# Patient Record
Sex: FEMALE | Race: BLACK OR AFRICAN AMERICAN | NOT HISPANIC OR LATINO | Employment: FULL TIME | URBAN - METROPOLITAN AREA
[De-identification: names, ages, dates, MRNs, and addresses within clinical notes are randomized per-mention and may not be internally consistent; named-entity substitution may affect disease eponyms.]

---

## 2023-02-21 ENCOUNTER — DOCUMENTATION (OUTPATIENT)
Dept: PRIMARY CARE | Facility: CLINIC | Age: 68
End: 2023-02-21
Payer: MEDICARE

## 2023-02-22 PROBLEM — G47.00 INSOMNIA: Status: ACTIVE | Noted: 2023-02-22

## 2023-02-22 PROBLEM — E78.5 HLD (HYPERLIPIDEMIA): Status: ACTIVE | Noted: 2023-02-22

## 2023-02-22 PROBLEM — M47.817 LUMBOSACRAL SPONDYLOSIS WITHOUT MYELOPATHY: Status: ACTIVE | Noted: 2023-02-22

## 2023-02-22 PROBLEM — I71.21 ANEURYSM OF ASCENDING AORTA (CMS-HCC): Status: ACTIVE | Noted: 2023-02-22

## 2023-02-22 PROBLEM — K21.9 ESOPHAGEAL REFLUX: Status: ACTIVE | Noted: 2023-02-22

## 2023-02-22 PROBLEM — J45.909 REACTIVE AIRWAY DISEASE (HHS-HCC): Status: ACTIVE | Noted: 2023-02-22

## 2023-02-22 PROBLEM — N17.9 AKI (ACUTE KIDNEY INJURY) (CMS-HCC): Status: ACTIVE | Noted: 2023-02-22

## 2023-02-22 PROBLEM — E11.9 DIABETES MELLITUS (MULTI): Status: ACTIVE | Noted: 2023-02-22

## 2023-02-22 PROBLEM — J32.9 CHRONIC SINUSITIS: Status: ACTIVE | Noted: 2023-02-22

## 2023-02-22 PROBLEM — Z96.21 COCHLEAR IMPLANT IN PLACE: Status: ACTIVE | Noted: 2023-02-22

## 2023-02-22 PROBLEM — M79.7 FIBROMYALGIA: Status: ACTIVE | Noted: 2023-02-22

## 2023-02-22 PROBLEM — R93.1 ELEVATED CORONARY ARTERY CALCIUM SCORE: Status: ACTIVE | Noted: 2023-02-22

## 2023-02-22 PROBLEM — F32.A DEPRESSION: Status: ACTIVE | Noted: 2023-02-22

## 2023-02-22 PROBLEM — I47.10 PAROXYSMAL SVT (SUPRAVENTRICULAR TACHYCARDIA) (CMS-HCC): Status: ACTIVE | Noted: 2023-02-22

## 2023-02-22 PROBLEM — N63.0 MASS OF BREAST: Status: ACTIVE | Noted: 2023-02-22

## 2023-02-22 PROBLEM — D86.9 SARCOIDOSIS: Status: ACTIVE | Noted: 2023-02-22

## 2023-02-22 PROBLEM — I73.9 PVD (PERIPHERAL VASCULAR DISEASE) (CMS-HCC): Status: ACTIVE | Noted: 2023-02-22

## 2023-02-22 PROBLEM — R73.03 PREDIABETES: Status: ACTIVE | Noted: 2023-02-22

## 2023-02-22 PROBLEM — M85.80 OSTEOPENIA: Status: ACTIVE | Noted: 2023-02-22

## 2023-02-22 PROBLEM — I10 BENIGN ESSENTIAL HYPERTENSION: Status: ACTIVE | Noted: 2023-02-22

## 2023-02-22 RX ORDER — BACLOFEN 10 MG/1
10 TABLET ORAL 2 TIMES DAILY
COMMUNITY
Start: 2016-10-06 | End: 2023-10-31 | Stop reason: SDUPTHER

## 2023-02-22 RX ORDER — AMMONIUM LACTATE 12 G/100G
1 CREAM TOPICAL 2 TIMES DAILY
COMMUNITY
Start: 2022-05-24

## 2023-02-22 RX ORDER — METOPROLOL SUCCINATE 200 MG/1
200 TABLET, EXTENDED RELEASE ORAL DAILY
COMMUNITY
Start: 2019-05-06 | End: 2024-01-08

## 2023-02-22 RX ORDER — ASPIRIN 81 MG/1
81 TABLET ORAL DAILY
COMMUNITY
Start: 2018-07-03

## 2023-02-22 RX ORDER — ALBUTEROL SULFATE 90 UG/1
1-2 AEROSOL, METERED RESPIRATORY (INHALATION) EVERY 4 HOURS PRN
COMMUNITY
Start: 2019-04-17

## 2023-02-22 RX ORDER — AMLODIPINE BESYLATE 5 MG/1
5 TABLET ORAL DAILY
COMMUNITY
Start: 2021-02-03

## 2023-02-22 RX ORDER — CLOTRIMAZOLE AND BETAMETHASONE DIPROPIONATE 10; .64 MG/G; MG/G
1 CREAM TOPICAL 2 TIMES DAILY
COMMUNITY
Start: 2022-09-12

## 2023-02-22 RX ORDER — ALBUTEROL SULFATE 0.83 MG/ML
2.5 SOLUTION RESPIRATORY (INHALATION) EVERY 4 HOURS PRN
COMMUNITY
Start: 2018-07-16

## 2023-02-22 RX ORDER — HYDROXYCHLOROQUINE SULFATE 200 MG/1
200 TABLET, FILM COATED ORAL 2 TIMES DAILY
COMMUNITY
Start: 2012-07-20

## 2023-02-22 RX ORDER — LORATADINE 10 MG/1
10 TABLET ORAL DAILY
COMMUNITY
Start: 2021-03-29

## 2023-02-22 RX ORDER — FUROSEMIDE 20 MG/1
20 TABLET ORAL DAILY
COMMUNITY
Start: 2022-06-14

## 2023-02-22 RX ORDER — PRAVASTATIN SODIUM 20 MG/1
20 TABLET ORAL DAILY
COMMUNITY
Start: 2018-07-03

## 2023-02-22 RX ORDER — LUBIPROSTONE 24 UG/1
24 CAPSULE ORAL
COMMUNITY
Start: 2021-09-03 | End: 2023-12-19 | Stop reason: SDUPTHER

## 2023-02-22 RX ORDER — CLOBETASOL PROPIONATE 0.5 MG/G
1 OINTMENT TOPICAL 2 TIMES DAILY PRN
COMMUNITY
Start: 2022-05-24

## 2023-02-22 RX ORDER — RABEPRAZOLE SODIUM 20 MG/1
20 TABLET, DELAYED RELEASE ORAL DAILY
COMMUNITY
Start: 2018-06-25

## 2023-02-22 RX ORDER — OXYCODONE AND ACETAMINOPHEN 7.5; 325 MG/1; MG/1
1 TABLET ORAL EVERY 8 HOURS PRN
COMMUNITY
Start: 2022-10-15 | End: 2023-10-31 | Stop reason: SDUPTHER

## 2023-04-26 LAB
ALBUMIN (MG/L) IN URINE: 10 MG/L
ALBUMIN/CREATININE (UG/MG) IN URINE: 7.2 UG/MG CRT (ref 0–30)
APPEARANCE, URINE: CLEAR
BACTERIA, URINE: ABNORMAL /HPF
BILIRUBIN, URINE: NEGATIVE
BLOOD, URINE: ABNORMAL
CALCIUM OXALATE CRYSTALS, URINE: ABNORMAL /HPF
COLOR, URINE: YELLOW
CREATININE (MG/DL) IN URINE: 138 MG/DL (ref 20–320)
CREATININE (MG/DL) IN URINE: 138 MG/DL (ref 20–320)
GLUCOSE, URINE: NEGATIVE MG/DL
KETONES, URINE: NEGATIVE MG/DL
LEUKOCYTE ESTERASE, URINE: NEGATIVE
MUCUS, URINE: ABNORMAL /LPF
NITRITE, URINE: NEGATIVE
PH, URINE: 5 (ref 5–8)
PROTEIN (MG/DL) IN URINE: 51 MG/DL (ref 5–24)
PROTEIN, URINE: NEGATIVE MG/DL
PROTEIN/CREATININE (MG/MG) IN URINE: 0.37 MG/MG CREAT (ref 0–0.17)
RBC, URINE: 1 /HPF (ref 0–5)
SPECIFIC GRAVITY, URINE: 1.02 (ref 1–1.03)
SQUAMOUS EPITHELIAL CELLS, URINE: 6 /HPF
UROBILINOGEN, URINE: <2 MG/DL (ref 0–1.9)
WBC, URINE: 3 /HPF (ref 0–5)

## 2023-05-10 LAB
ALANINE AMINOTRANSFERASE (SGPT) (U/L) IN SER/PLAS: 6 U/L (ref 7–45)
ALBUMIN (G/DL) IN SER/PLAS: 3.8 G/DL (ref 3.4–5)
ALKALINE PHOSPHATASE (U/L) IN SER/PLAS: 99 U/L (ref 33–136)
ANION GAP IN SER/PLAS: 11 MMOL/L (ref 10–20)
ASPARTATE AMINOTRANSFERASE (SGOT) (U/L) IN SER/PLAS: 10 U/L (ref 9–39)
BASOPHILS (10*3/UL) IN BLOOD BY AUTOMATED COUNT: 0.02 X10E9/L (ref 0–0.1)
BASOPHILS/100 LEUKOCYTES IN BLOOD BY AUTOMATED COUNT: 0.4 % (ref 0–2)
BILIRUBIN TOTAL (MG/DL) IN SER/PLAS: 0.6 MG/DL (ref 0–1.2)
CALCIUM (MG/DL) IN SER/PLAS: 9.6 MG/DL (ref 8.6–10.6)
CARBON DIOXIDE, TOTAL (MMOL/L) IN SER/PLAS: 34 MMOL/L (ref 21–32)
CHLORIDE (MMOL/L) IN SER/PLAS: 102 MMOL/L (ref 98–107)
CREATININE (MG/DL) IN SER/PLAS: 1.2 MG/DL (ref 0.5–1.05)
EOSINOPHILS (10*3/UL) IN BLOOD BY AUTOMATED COUNT: 0.14 X10E9/L (ref 0–0.7)
EOSINOPHILS/100 LEUKOCYTES IN BLOOD BY AUTOMATED COUNT: 2.8 % (ref 0–6)
ERYTHROCYTE DISTRIBUTION WIDTH (RATIO) BY AUTOMATED COUNT: 13 % (ref 11.5–14.5)
ERYTHROCYTE MEAN CORPUSCULAR HEMOGLOBIN CONCENTRATION (G/DL) BY AUTOMATED: 31.7 G/DL (ref 32–36)
ERYTHROCYTE MEAN CORPUSCULAR VOLUME (FL) BY AUTOMATED COUNT: 91 FL (ref 80–100)
ERYTHROCYTES (10*6/UL) IN BLOOD BY AUTOMATED COUNT: 4.43 X10E12/L (ref 4–5.2)
GFR FEMALE: 50 ML/MIN/1.73M2
GLUCOSE (MG/DL) IN SER/PLAS: 82 MG/DL (ref 74–99)
HEMATOCRIT (%) IN BLOOD BY AUTOMATED COUNT: 40.4 % (ref 36–46)
HEMOGLOBIN (G/DL) IN BLOOD: 12.8 G/DL (ref 12–16)
IGA (MG/DL) IN SER/PLAS: 299 MG/DL (ref 70–400)
IGG (MG/DL) IN SER/PLAS: 1030 MG/DL (ref 700–1600)
IGM (MG/DL) IN SER/PLAS: 61 MG/DL (ref 40–230)
IMMATURE GRANULOCYTES/100 LEUKOCYTES IN BLOOD BY AUTOMATED COUNT: 0.2 % (ref 0–0.9)
LACTATE DEHYDROGENASE (U/L) IN SER/PLAS BY LAC->PYR RXN: 168 U/L (ref 84–246)
LEUKOCYTES (10*3/UL) IN BLOOD BY AUTOMATED COUNT: 4.9 X10E9/L (ref 4.4–11.3)
LYMPHOCYTES (10*3/UL) IN BLOOD BY AUTOMATED COUNT: 1.68 X10E9/L (ref 1.2–4.8)
LYMPHOCYTES/100 LEUKOCYTES IN BLOOD BY AUTOMATED COUNT: 34.1 % (ref 13–44)
MONOCYTES (10*3/UL) IN BLOOD BY AUTOMATED COUNT: 0.4 X10E9/L (ref 0.1–1)
MONOCYTES/100 LEUKOCYTES IN BLOOD BY AUTOMATED COUNT: 8.1 % (ref 2–10)
NEUTROPHILS (10*3/UL) IN BLOOD BY AUTOMATED COUNT: 2.68 X10E9/L (ref 1.2–7.7)
NEUTROPHILS/100 LEUKOCYTES IN BLOOD BY AUTOMATED COUNT: 54.4 % (ref 40–80)
NRBC (PER 100 WBCS) BY AUTOMATED COUNT: 0 /100 WBC (ref 0–0)
PLATELETS (10*3/UL) IN BLOOD AUTOMATED COUNT: 239 X10E9/L (ref 150–450)
POTASSIUM (MMOL/L) IN SER/PLAS: 3.8 MMOL/L (ref 3.5–5.3)
PROTEIN TOTAL: 6.4 G/DL (ref 6.4–8.2)
SODIUM (MMOL/L) IN SER/PLAS: 143 MMOL/L (ref 136–145)
URATE (MG/DL) IN SER/PLAS: 6.5 MG/DL (ref 2.3–6.7)
UREA NITROGEN (MG/DL) IN SER/PLAS: 23 MG/DL (ref 6–23)

## 2023-05-11 LAB
IMMUNOGLOBULIN LIGHT CHAINS KAPPA/LAMBDA (MASS RATIO) IN SERUM: 0.08 (ref 0.26–1.65)
IMMUNOGLOBULIN LIGHT CHAINS.KAPPA (MG/DL) IN SERUM: 3.96 MG/DL (ref 0.33–1.94)
IMMUNOGLOBULIN LIGHT CHAINS.LAMBDA (MG/DL) IN SERUM: 46.87 MG/DL (ref 0.57–2.63)

## 2023-05-12 LAB
6-ACETYLMORPHINE: <25 NG/ML
7-AMINOCLONAZEPAM: <25 NG/ML
ALPHA-HYDROXYALPRAZOLAM: <25 NG/ML
ALPHA-HYDROXYMIDAZOLAM: <25 NG/ML
ALPRAZOLAM: <25 NG/ML
AMPHETAMINE (PRESENCE) IN URINE BY SCREEN METHOD: ABNORMAL
BARBITURATES PRESENCE IN URINE BY SCREEN METHOD: ABNORMAL
CANNABINOIDS IN URINE BY SCREEN METHOD: ABNORMAL
CHLORDIAZEPOXIDE: <25 NG/ML
CLONAZEPAM: <25 NG/ML
COCAINE (PRESENCE) IN URINE BY SCREEN METHOD: ABNORMAL
CODEINE: <50 NG/ML
CREATINE, URINE FOR DRUG: 199.9 MG/DL
DIAZEPAM: <25 NG/ML
DRUG SCREEN COMMENT URINE: ABNORMAL
EDDP: <25 NG/ML
FENTANYL CONFIRMATION, URINE: <2.5 NG/ML
HYDROCODONE: <25 NG/ML
HYDROMORPHONE: <25 NG/ML
LORAZEPAM: <25 NG/ML
METHADONE CONFIRMATION,URINE: <25 NG/ML
MIDAZOLAM: <25 NG/ML
MORPHINE URINE: <50 NG/ML
NORDIAZEPAM: <25 NG/ML
NORFENTANYL: <2.5 NG/ML
NORHYDROCODONE: <25 NG/ML
NOROXYCODONE: >1000 NG/ML
O-DESMETHYLTRAMADOL: <50 NG/ML
OXAZEPAM: <25 NG/ML
OXYCODONE: 1830 NG/ML
OXYMORPHONE: 102 NG/ML
PHENCYCLIDINE (PRESENCE) IN URINE BY SCREEN METHOD: ABNORMAL
TEMAZEPAM: <25 NG/ML
TRAMADOL: <50 NG/ML
ZOLPIDEM METABOLITE (ZCA): <25 NG/ML
ZOLPIDEM: <25 NG/ML

## 2023-05-14 LAB
ALBUMIN ELP: 3.7 G/DL (ref 3.4–5)
ALPHA 1: 0.3 G/DL (ref 0.2–0.6)
ALPHA 2: 0.6 G/DL (ref 0.4–1.1)
BETA: 0.9 G/DL (ref 0.5–1.2)
GAMMA GLOBULIN: 0.9 G/DL (ref 0.5–1.4)
PATH REVIEW - SERUM IMMUNOFIXATION: NORMAL
PATH REVIEW-SERUM PROTEIN ELECTROPHORESIS: NORMAL
PROTEIN ELECTROPHORESIS INTERPRETATION: NORMAL
PROTEIN TOTAL: 6.4 G/DL (ref 6.4–8.2)
SERUM IMMUNOFIXATION INTERPRETATION: NORMAL

## 2023-05-16 LAB
AMPHETAMINES,URINE: <50 NG/ML
MDA,URINE: <200 NG/ML
MDEA,URINE: <200 NG/ML
MDMA,UR: <200 NG/ML
METHAMPHETAMINE QUANTITATIVE URINE: <200 NG/ML
PHENTERMINE,UR: <200 NG/ML

## 2023-05-30 ENCOUNTER — PATIENT OUTREACH (OUTPATIENT)
Dept: PRIMARY CARE | Facility: CLINIC | Age: 68
End: 2023-05-30
Payer: MEDICARE

## 2023-05-30 DIAGNOSIS — E11.9 TYPE 2 DIABETES MELLITUS WITHOUT COMPLICATION, WITHOUT LONG-TERM CURRENT USE OF INSULIN (MULTI): ICD-10-CM

## 2023-05-30 DIAGNOSIS — J45.909 ASTHMA, UNSPECIFIED ASTHMA SEVERITY, UNSPECIFIED WHETHER COMPLICATED, UNSPECIFIED WHETHER PERSISTENT (HHS-HCC): ICD-10-CM

## 2023-05-30 PROCEDURE — 99490 CHRNC CARE MGMT STAFF 1ST 20: CPT | Performed by: STUDENT IN AN ORGANIZED HEALTH CARE EDUCATION/TRAINING PROGRAM

## 2023-05-30 NOTE — PROGRESS NOTES
CCM call made. Pt has been doing well. Continues to follow up with neurology and vascular. NNO's per pt. Also stated she is losing vision in her left eye and needs further testing for possible cataract. Has been taking prescribed medications with no side or adverse affects. Contact info left with pt if any questions or needs should arise.

## 2023-06-08 DIAGNOSIS — N64.89 BREAST ASYMMETRY: Primary | ICD-10-CM

## 2023-06-09 ENCOUNTER — TELEPHONE (OUTPATIENT)
Dept: PRIMARY CARE | Facility: CLINIC | Age: 68
End: 2023-06-09

## 2023-06-09 NOTE — TELEPHONE ENCOUNTER
Left message for patient letting her know that she needs to have further imaging on her right breast.

## 2023-06-29 ENCOUNTER — PATIENT OUTREACH (OUTPATIENT)
Dept: PRIMARY CARE | Facility: CLINIC | Age: 68
End: 2023-06-29
Payer: MEDICARE

## 2023-06-29 DIAGNOSIS — J45.909 ASTHMA, UNSPECIFIED ASTHMA SEVERITY, UNSPECIFIED WHETHER COMPLICATED, UNSPECIFIED WHETHER PERSISTENT (HHS-HCC): ICD-10-CM

## 2023-06-29 DIAGNOSIS — I10 HYPERTENSION, UNSPECIFIED TYPE: ICD-10-CM

## 2023-07-27 ENCOUNTER — PATIENT OUTREACH (OUTPATIENT)
Dept: PRIMARY CARE | Facility: CLINIC | Age: 68
End: 2023-07-27
Payer: MEDICARE

## 2023-07-27 DIAGNOSIS — J45.909 ASTHMA, UNSPECIFIED ASTHMA SEVERITY, UNSPECIFIED WHETHER COMPLICATED, UNSPECIFIED WHETHER PERSISTENT (HHS-HCC): ICD-10-CM

## 2023-07-27 DIAGNOSIS — I10 HYPERTENSION, UNSPECIFIED TYPE: ICD-10-CM

## 2023-08-08 ENCOUNTER — PATIENT OUTREACH (OUTPATIENT)
Dept: PRIMARY CARE | Facility: CLINIC | Age: 68
End: 2023-08-08
Payer: MEDICARE

## 2023-08-08 DIAGNOSIS — I10 HYPERTENSION, UNSPECIFIED TYPE: ICD-10-CM

## 2023-08-08 DIAGNOSIS — J45.909 ASTHMA, UNSPECIFIED ASTHMA SEVERITY, UNSPECIFIED WHETHER COMPLICATED, UNSPECIFIED WHETHER PERSISTENT (HHS-HCC): ICD-10-CM

## 2023-08-08 DIAGNOSIS — E11.9 TYPE 2 DIABETES MELLITUS WITHOUT COMPLICATION, WITHOUT LONG-TERM CURRENT USE OF INSULIN (MULTI): ICD-10-CM

## 2023-08-08 NOTE — PROGRESS NOTES
Monthly outreach call to Ms Saab to introduce myself and give my idrect contact information, left VM for the pt.    LOV: 23 cancelled  NOV: not scheduled    Health Maintenance Due: MAWV, Bone Density, Diabetic Foot Exam, DM Retinopathy Screening, DM A1C, Lipid panel    Treatment Goals:  For high blood pressure:   Treatment goals include:  [place]/continue prudent diet and regular exercise.   Blood Pressure Treatment goals include:   BP of 120/80, with no higher than 140/85 on consistent basis.   Exercise at least 3-4 days a week for at least 30 minutes  Eat a balanced diet, rich in fruits and vegetables, low in sodium and processed foods.  If you are overweight, work toward a goal BMI of less than 25, with short-term goal of 10 pound weight loss.    Think about those things which may be affecting your ability to reach those goals, and develop a plan to overcome them.    Additional resources are available upon request to help you attain goals, including dietitian.     For diabetes:   Treatment goals include:  HgbA1C less than 7.0%  Fasting B - 130 mg/dL  Postprandial BG: less than 180 mg/dL  /80 on consistent basis, with no higher than 140/85  LDL cholesterol less than 100, and HDL cholesterol above 40.  Yearly retinal exam  Check feet periodically for sores or decreased sensation  Exercise at least 3-4 days a week for at least 30 minutes  Work toward a goal BMI of less than 25, although in the shorter term, losing even 10 pounds will help.    Left VM for pt for any healthcare needs.

## 2023-08-30 ENCOUNTER — PATIENT OUTREACH (OUTPATIENT)
Dept: PRIMARY CARE | Facility: CLINIC | Age: 68
End: 2023-08-30
Payer: MEDICARE

## 2023-08-30 NOTE — PROGRESS NOTES
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  Pt marked as graduated.

## 2023-10-31 ENCOUNTER — OFFICE VISIT (OUTPATIENT)
Dept: PAIN MEDICINE | Facility: CLINIC | Age: 68
End: 2023-10-31
Payer: OTHER GOVERNMENT

## 2023-10-31 DIAGNOSIS — S51.011A LACERATION OF RIGHT ELBOW, INITIAL ENCOUNTER: ICD-10-CM

## 2023-10-31 DIAGNOSIS — S43.402A UNSPECIFIED SPRAIN OF LEFT SHOULDER JOINT, INITIAL ENCOUNTER: ICD-10-CM

## 2023-10-31 DIAGNOSIS — S80.02XA CONTUSION OF LEFT KNEE, INITIAL ENCOUNTER: Primary | ICD-10-CM

## 2023-10-31 DIAGNOSIS — S33.5XXA LUMBAR SPRAIN, INITIAL ENCOUNTER: ICD-10-CM

## 2023-10-31 PROCEDURE — 99214 OFFICE O/P EST MOD 30 MIN: CPT | Performed by: PHYSICAL MEDICINE & REHABILITATION

## 2023-10-31 RX ORDER — NALOXONE HYDROCHLORIDE 4 MG/.1ML
4 SPRAY NASAL AS NEEDED
Qty: 2 EACH | Refills: 1 | Status: SHIPPED | OUTPATIENT
Start: 2023-10-31 | End: 2023-11-02

## 2023-10-31 RX ORDER — BACLOFEN 10 MG/1
10 TABLET ORAL 2 TIMES DAILY
Qty: 56 TABLET | Refills: 2 | Status: SHIPPED | OUTPATIENT
Start: 2023-10-31 | End: 2023-12-19 | Stop reason: SDUPTHER

## 2023-10-31 RX ORDER — TRAZODONE HYDROCHLORIDE 50 MG/1
50 TABLET ORAL NIGHTLY
Qty: 30 TABLET | Refills: 2 | Status: SHIPPED | OUTPATIENT
Start: 2023-10-31 | End: 2023-12-19 | Stop reason: SDUPTHER

## 2023-10-31 RX ORDER — OXYCODONE AND ACETAMINOPHEN 7.5; 325 MG/1; MG/1
1 TABLET ORAL EVERY 8 HOURS PRN
Qty: 84 TABLET | Refills: 0 | Status: SHIPPED | OUTPATIENT
Start: 2023-11-07 | End: 2023-12-05

## 2023-10-31 RX ORDER — OXYCODONE AND ACETAMINOPHEN 7.5; 325 MG/1; MG/1
1 TABLET ORAL EVERY 8 HOURS PRN
Qty: 84 TABLET | Refills: 0 | Status: SHIPPED | OUTPATIENT
Start: 2023-12-05 | End: 2023-12-19 | Stop reason: SDUPTHER

## 2023-10-31 NOTE — PROGRESS NOTES
DOL claim [id number]  DOI 08/30/2019        · Contusion of knee, left  (S80.02XA)   · Elbow laceration, right, initial encounter  (S51.011A)   · Sprain ligaments of lumbar spine S33.5XXA   . Unsp Sprain left shoulder joint, initial encounter S43.402A      Chief complaint  Right elbow pain.  Left shoulder pain and left knee pain    History  Ms. Saab returns today for follow-up evaluation.The pain in the left  knee is deep achy stabbing.  It is both on the medial and lateral aspects and behind the kneecap.  The knee pain is associated with sensation of crunching upon range of motion and weightbearing.  The pain is continuous at rest associated with stiffness with prolonged sitting and get worse with standing walking or any weightbearing activity.  Occasionally there is knee swelling.  No change in color or texture of the skin.  No pain proximal to the thigh or distal to the leg associated with the knee pain.  With episodes of aggravation of the pain there are occasion of sensation of instability but no falls.  .The pain in the left shoulder is deep and stabbing.  It is associated with sharp sensation inside the shoulder joint mainly with movements.  The pain is continuous at rest and it gets worse with reaching forward and overhead.  Also reaching outward increases the pain.  There is no radiation of the pain to the upper limb.  The pain is associated with tight muscle bands around the shoulder blade.  These gets worse with shoulder stabilization like working at shoulder level or even doing any activity with the upper limb when it is not supported.  There is occasional sensation of fine cracking inside the shoulder joint when the shoulder position is changed rapidly.  Also occasionally there is a sensation of fullness inside the shoulder joint.  The left shoulder catches when it reaches at shoulder level.  With maneuvering and external rotation the shoulder can go up few more degrees.  The left shoulder gets tired  quickly with any activity.  Please note that these pain in the left knee and left shoulder appeared after she fell and she has been having difficulty with approval for additional condition.      She also has pain in the back down the right lower limb this is covered by a prior claim but was recently aggravated with the fall above    Denied any fever or chills. No weight loss and no night sweats. No cough or sputum production. No diarrhea   The constipation has been responding to fibers and over the counter medications.     No bladder and bowel incontinence and no other changes in bladder and bowel. No skin changes.  Reports tiredness and fatigability only if the pain is not controlled.   Denied opioids diversion and abuse and denies alcoholism. Denies overuse of the pain medications.    opioids treatment agreement January 2023  Oarrs pulled and scanned in the chart no concerns , she realizes the interaction between the therapeutic classes including the respiratory depression and potential death      last urine toxicology testing in May 2023 and that was compatible. with the prescription will repeat  Xray updated shoulder and knee we need the MRI as above   ORT Score is 0  Pain pathology and pain generators shoulder and knee and lumbar spine   Modalities tried injection Physical Therapy home exercises program and Non steroidal anti inflammatory medications      Physical examination  .  Insert AAO.declined Chaperone for the visit and was adequately  draped for the exam.  The left shoulder physical examination showed mild atrophy of the left supraspinatus compared to the right side.  Impingement at 90 degrees in forward flexion and abduction.  This improved by few degrees upon external rotation.  Tight muscle bands and trigger points around the shoulder blade muscles.  No overt shoulder instability but tenderness on palpation of the subacromial area.  Negative cervical root tension sign.  Left shoulder strength is 4/5  in all directions.  No distal sensorimotor deficits associated with the left shoulder.  Examination of the left knee showed mild clinical effusion. Normal skin color and texture palpation of the knee showed tenderness over the medial and lateral joint line and the sensation of crepitation upon range of motion.  Range of motion from -2 degrees to 105 degrees. No medial lateral instability. No drawer sign.  Lachman is negative.  Positive Robert both medially and laterally.  Negative pivot shift.  Homans' sign is negative.  Calves are soft.  Knee flexion and extension is 4/5 and limited by the pain.      Diagnosis  Problem List Items Addressed This Visit       Contusion of left knee - Primary    Relevant Medications    oxyCODONE-acetaminophen (Percocet) 7.5-325 mg tablet (Start on 11/7/2023)    baclofen (Lioresal) 10 mg tablet    traZODone (Desyrel) 50 mg tablet    oxyCODONE-acetaminophen (Percocet) 7.5-325 mg tablet (Start on 12/5/2023)    naloxone (Narcan) 4 mg/0.1 mL nasal spray    Other Relevant Orders    Opiate/Opioid/Benzo Prescription Compliance     Other Visit Diagnoses       Laceration of right elbow, initial encounter        Relevant Medications    oxyCODONE-acetaminophen (Percocet) 7.5-325 mg tablet (Start on 11/7/2023)    baclofen (Lioresal) 10 mg tablet    traZODone (Desyrel) 50 mg tablet    oxyCODONE-acetaminophen (Percocet) 7.5-325 mg tablet (Start on 12/5/2023)    naloxone (Narcan) 4 mg/0.1 mL nasal spray    Other Relevant Orders    Opiate/Opioid/Benzo Prescription Compliance    Lumbar sprain, initial encounter        Relevant Medications    naloxone (Narcan) 4 mg/0.1 mL nasal spray    Unspecified sprain of left shoulder joint, initial encounter                 Plan  Reviewed the pain generators.  Went over the types of pain with neuropathic and nociceptive and different pathologies and therapeutic modalities. Discussed the mechanism of action of interventions from acupuncture, physical therapy ,  regular exercises, injections, botox, spinal cord stimulation, and role of surgery    Based on the above findings and the clinical response to the opioids medications and improvement of the activities of daily living, sleep, and work performance. We made this complex decision to continue the opioids therapy in light of the evidence of the patient's responsibility in using the pain medications as prescribed for the nonmalignant chronic pain condition. We discussed about the use of the pain medications to treat the symptoms of chronic nonmalignant pain and we are not trying the repair the permanent damage in the tissues, rather we are trying to control the symptoms induced by the permanent damage to the tissues inducing the chronic pain situation and resulting disability. I explained the difference between controlling the pain and permanent tissues damage inducing the chronic pain and discussed it with the patient and stressed the importance of knowing the difference especially because of the side effects and the addicting and habit forming nature of the dangerous drugs we are using to treat the symptoms of the chronic pain.   We discussed that we are prescribing the medications on good quinn and legitimate medical reason.     Continue with oxycodone for pain control at 7.5 mg 3 times a day.I detailed the side effects from the acetaminophen in the medication and made aware of those. I also explained about the cumulative effects on the organs and mainly the liver.  Continue baclofen for muscle relaxers    She takes trazodone to help with the sleep and with the burning pain  Random drug testing for compliance check    Follow-up 8 weeks or earlier if needed

## 2023-12-13 PROBLEM — E11.9 DIABETES MELLITUS (MULTI): Status: RESOLVED | Noted: 2023-02-22 | Resolved: 2023-12-13

## 2023-12-19 ENCOUNTER — OFFICE VISIT (OUTPATIENT)
Dept: PAIN MEDICINE | Facility: CLINIC | Age: 68
End: 2023-12-19
Payer: OTHER GOVERNMENT

## 2023-12-19 DIAGNOSIS — S80.02XA CONTUSION OF LEFT KNEE, INITIAL ENCOUNTER: Primary | ICD-10-CM

## 2023-12-19 DIAGNOSIS — S43.402A SPRAIN OF LEFT SHOULDER, UNSPECIFIED SHOULDER SPRAIN TYPE, INITIAL ENCOUNTER: ICD-10-CM

## 2023-12-19 DIAGNOSIS — S51.011A LACERATION OF RIGHT ELBOW, INITIAL ENCOUNTER: ICD-10-CM

## 2023-12-19 DIAGNOSIS — S33.5XXA LUMBAR SPRAIN, INITIAL ENCOUNTER: ICD-10-CM

## 2023-12-19 PROCEDURE — 99214 OFFICE O/P EST MOD 30 MIN: CPT | Performed by: PHYSICAL MEDICINE & REHABILITATION

## 2023-12-19 RX ORDER — LUBIPROSTONE 24 UG/1
24 CAPSULE ORAL
Qty: 60 CAPSULE | Refills: 1 | Status: SHIPPED | OUTPATIENT
Start: 2023-12-19 | End: 2024-05-09 | Stop reason: SDUPTHER

## 2023-12-19 RX ORDER — TRAZODONE HYDROCHLORIDE 50 MG/1
50 TABLET ORAL NIGHTLY
Qty: 30 TABLET | Refills: 2 | Status: SHIPPED | OUTPATIENT
Start: 2023-12-19 | End: 2024-02-15 | Stop reason: SDUPTHER

## 2023-12-19 RX ORDER — OXYCODONE AND ACETAMINOPHEN 7.5; 325 MG/1; MG/1
1 TABLET ORAL EVERY 8 HOURS PRN
Qty: 84 TABLET | Refills: 0 | Status: SHIPPED | OUTPATIENT
Start: 2024-01-31 | End: 2024-01-10 | Stop reason: SDUPTHER

## 2023-12-19 RX ORDER — NALOXONE HYDROCHLORIDE 4 MG/.1ML
4 SPRAY NASAL AS NEEDED
Qty: 2 EACH | Refills: 0 | Status: SHIPPED | OUTPATIENT
Start: 2023-12-19

## 2023-12-19 RX ORDER — BACLOFEN 10 MG/1
10 TABLET ORAL 2 TIMES DAILY
Qty: 56 TABLET | Refills: 2 | Status: SHIPPED | OUTPATIENT
Start: 2023-12-19 | End: 2024-01-16

## 2023-12-19 RX ORDER — OXYCODONE AND ACETAMINOPHEN 7.5; 325 MG/1; MG/1
1 TABLET ORAL EVERY 8 HOURS PRN
Qty: 84 TABLET | Refills: 0 | Status: SHIPPED | OUTPATIENT
Start: 2024-01-03 | End: 2024-01-10 | Stop reason: SDUPTHER

## 2023-12-19 NOTE — PROGRESS NOTES
DOL claim [id number]  DOI 08/30/2019         · Contusion of knee, left  (S80.02XA)   · Elbow laceration, right, initial encounter  (S51.011A)   · Sprain ligaments of lumbar spine S33.5XXA   . Unsp Sprain left shoulder joint, initial encounter S43.402A       Chief complaint  Left knee pain and left shoulder pain   Back pain   Right elbow pain    History  Ms Saab is having still the pain in the L knee and shoulder and right elbow as well as the back  The back was injured in earlier injury but this injury and fall at work aggravated her pain   The pain in the left  knee is deep achy stabbing.  It is both on the medial and lateral aspects and behind the kneecap.  The knee pain is associated with sensation of crunching upon range of motion and weightbearing.  The pain is continuous at rest associated with stiffness with prolonged sitting and get worse with standing walking or any weightbearing activity.  Occasionally there is knee swelling.  No change in color or texture of the skin.  No pain proximal to the thigh or distal to the leg associated with the knee pain.  With episodes of aggravation of the pain there are occasion of sensation of instability but no falls.    The pain in the right elbow is deep and stabbing over the lateral and medial aspects .  It is associated with sharp sensation inside the elbow  joint mainly with movements or any gripping or use of the hand especially if he has to use power.  The pain is continuous at rest and it gets worse with use of the elbow and hand for any manual activity.  Carrying and lifting are also affected because they increase the pain.  There is no radiation of the pain to the upper limb.  The pain is associated with tight muscle bands around the right elbow and forearm.  These gets worse with wrist stabilization like working with the hands.  There is occasional sensation of fine cracking inside the right elbow  joint when the elbow position is changed rapidly.  Also  occasionally there is a sensation of fullness inside the elbow joint.  The elbow catches upon range of motion in flexion and dorsiflexion.  The right elbow gets tired quickly with any activity.    The pain in the left shoulder is deep and stabbing.  It is associated with sharp sensation inside the shoulder joint mainly with movements.  The pain is continuous at rest and it gets worse with reaching forward and overhead.  Also reaching outward increases the pain.  There is no radiation of the pain to the upper limb.  The pain is associated with tight muscle bands around the shoulder blade.  These gets worse with shoulder stabilization like working at shoulder level or even doing any activity with the upper limb when it is not supported.  There is occasional sensation of fine cracking inside the shoulder joint when the shoulder position is changed rapidly.  Also occasionally there is a sensation of fullness inside the shoulder joint.  The left shoulder catches when it reaches at shoulder level.  With maneuvering and external rotation the shoulder can go up few more degrees.  The left shoulder gets tired quickly with any activity.     Pain in the back is now associated with radiation to the lower limbs. The pain in the back is deep achy worse in the mid back area.  This is associated with tight muscle bands.  This limits the range of motion of the lumbar spine mainly in forward flexion.  The pain in the back is radiating around the side on the lateral aspect of the   thighs going down toward the lateral aspect of the legs into the lateral malleosli toward the lateral aspect of the feet towards the big toes.    This pain down to the lower limbs is more of a burning tingling sensation.  The pain in the   lower limbs worsens with bending forward or with any lifting, it improves with laying on the side and resting.  This is similar to the associated pain in the middle to lower back.  Denied any bowel or bladder  incontinence.  With the worst pain there is tendency to catch the toe especially on carpeted area.  This occurs mostly when tired and toward the end of the day.    Pain level without medication is 8/10 , with the medication pain level 4/10.     The pain meds are helping control the pain and improving Activities of Daily living and quality of life and quality of sleep.    opioids treatment agreement 2023  Oarrs pulled and scanned in the chart  no concerns  last urine toxicology testing earlier this year and it was compliant we will repeat  Xray updated L spine   ORT Score is  0  Pain pathology and pain generators spine and joints   Modalities tried injection, surgery, physical therapy, TENS unit, nonsteroidal anti-inflammatory medication       Denied any fever or chills. No weight loss and no night sweats. No cough or sputum production. No diarrhea   The constipation has been responding to fibers and over the counter medications.     No bladder and bowel incontinence and no other changes in bladder and bowel. No skin changes.  Reports tiredness and fatigability only if the pain is not controlled.   Denied opioids diversion and abuse and denies alcoholism. Denies overuse of the pain medications.    The control of the pain with the pain medications is helping the control of the symptoms and allowing the function and activities of daily living, enjoyment of life, improving the quality of life and sleep with less interruption by the pain. The goal is symptomatic control of the nonmalignant chronic pain and not to repair the permanent damage in the tissues inducing the chronic pain conditions. We are aiming to shift the focus from the nonmalignant chronic pain to other aspects of life by symptomatically treating this chronic pain. If this pain is not treated it will lead to major morbidity and it is also associated with increased risks of mortality. The patient understands those very clearly and also understand high risks  of morbidity and mortality if not strictly adherent to the treatment recommendations and reporting any associated side effects. Also patient understand the full responsibility associated with these medications to avoid abuse or overuse or any use of these medications for anything besides treating the patient's own chronic pain and nothing else under any circumstances.        Physical examination  Awake, alert and oriented for time place and persons   declined Chaperone for the visit and was adequately  draped for the exam.  The left shoulder physical examination showed mild atrophy of the left supraspinatus compared to the right side.  Impingement at 90 degrees in forward flexion and abduction.  This improved by few degrees upon external rotation.  Tight muscle bands and trigger points around the shoulder blade muscles.  No overt shoulder instability but tenderness on palpation of the subacromial area.  Negative cervical root tension sign.  Left shoulder strength is 4/5 in all directions.  No distal sensorimotor deficits associated with the left shoulder.    The right elbow physical examination showed mild atrophy of the tissues around the elbow.  Limited range of motion in flexion and extension.   Tight muscle bands and trigger points around the distal arm no overt elbow instability but tenderness on palpation of the elbow epicondyles both medial and lateral area.  Negative cervical root tension sign.  Right elbow strength is 4/5 in all directions.        No distal sensorimotor deficits appreciated.     Examination of the left knee showed mild clinical effusion. Normal skin color and texture palpation of the knee showed tenderness over the medial and lateral joint line and the sensation of crepitation upon range of motion.  Range of motion from -2 degrees to 105 degrees. No medial lateral instability. No drawer sign.  Lachman is negative.  Positive Robert both medially and laterally.  Negative pivot shift.   Homans' sign is negative.  Calves are soft.  Knee flexion and extension is 4/5 and limited by the pain.     Diagnosis  Problem List Items Addressed This Visit       Contusion of left knee - Primary    Relevant Medications    oxyCODONE-acetaminophen (Percocet) 7.5-325 mg tablet (Start on 1/3/2024)    oxyCODONE-acetaminophen (Percocet) 7.5-325 mg tablet (Start on 1/31/2024)    lubiprostone (Amitiza) 24 mcg capsule    traZODone (Desyrel) 50 mg tablet    naloxone (Narcan) 4 mg/0.1 mL nasal spray    baclofen (Lioresal) 10 mg tablet    Laceration of right elbow    Relevant Medications    oxyCODONE-acetaminophen (Percocet) 7.5-325 mg tablet (Start on 1/3/2024)    oxyCODONE-acetaminophen (Percocet) 7.5-325 mg tablet (Start on 1/31/2024)    lubiprostone (Amitiza) 24 mcg capsule    traZODone (Desyrel) 50 mg tablet    naloxone (Narcan) 4 mg/0.1 mL nasal spray    baclofen (Lioresal) 10 mg tablet    Lumbar sprain    Relevant Medications    oxyCODONE-acetaminophen (Percocet) 7.5-325 mg tablet (Start on 1/3/2024)    oxyCODONE-acetaminophen (Percocet) 7.5-325 mg tablet (Start on 1/31/2024)    lubiprostone (Amitiza) 24 mcg capsule    traZODone (Desyrel) 50 mg tablet    naloxone (Narcan) 4 mg/0.1 mL nasal spray    Sprain of shoulder, left    Relevant Medications    oxyCODONE-acetaminophen (Percocet) 7.5-325 mg tablet (Start on 1/3/2024)    oxyCODONE-acetaminophen (Percocet) 7.5-325 mg tablet (Start on 1/31/2024)    lubiprostone (Amitiza) 24 mcg capsule    traZODone (Desyrel) 50 mg tablet    naloxone (Narcan) 4 mg/0.1 mL nasal spray        Plan  Reviewed the pain generators.  Went over the types of pain with neuropathic and nociceptive and different pathologies and therapeutic modalities. Discussed the mechanism of action of interventions from acupuncture, physical therapy , regular exercises, injections, botox, spinal cord stimulation, and role of surgery     Went over pathology of the intervertebral disc displacement and the  anatomical relation to the Nerve roots and relation to the radicular symptoms. Went over treatment modalities with conservative treatment including acupuncture   and epidural steroid injection with fluoroscopy guidance and last resort of surgery    Based on the above findings and the clinical response to the opioids medications and improvement of the activities of daily living, sleep, and work performance. We made this complex decision to continue the opioids therapy in light of the evidence of the patient's responsibility in using the pain medications as prescribed for the nonmalignant chronic pain condition. We discussed about the use of the pain medications to treat the symptoms of chronic nonmalignant pain and we are not trying the repair the permanent damage in the tissues, rather we are trying to control the symptoms induced by the permanent damage to the tissues inducing the chronic pain condition and resulting disability. I explained the difference and discussed it with the patient and stressed the importance of knowing the difference especially because of the potential side effects and the potential addicting effect and habit forming nature of the dangerous drugs we are using to treat the symptoms of the chronic pain.      We discussed that we are prescribing the medications on good quinn and legitimate medical reason.     We reviewed the side effects and precautions of opioids prescriptions as discussed in the opioids treatment agreement.    realizes the interaction between the therapeutic classes including the respiratory depression and potential death     Random drug testing twice in 6 months we will submit     Oxycodone 7.5 TID also continue lubiprostone for OIC and baclofen  She has a narcan at home know how and when to use it if needed.     Discussed about NSAIDS and I explained about the opioids sparing effect to allow keeping the opioids dose at minimal effective dose.   I went over the potential  side effects of the NSAIDS on the gastrointestinal, renal and cardiovascular systems.      I detailed the side effects from the acetaminophen in the medication and made aware of those. I also explained about the cumulative effects on the organs and mainly the liver.     Given the opioids therapy , we discussed about the risk for accidental over dose on the pain medications, either for patient or other household. I went over the mechanism of action and mode of use of the Naloxone according to the  recommendations. I will provide a prescription for a kit.     Follow-up 8 weeks or earlier if needed     The level of clinical decision making in this office visit,  is high, given the high risks of complications with the morbidity and mortality due to the fact that acute and chronic pain may pose a threat to life and bodily function, if under treated, poorly treated, or with failure to maintain adequate treatment and timely medical follow up. Additionally over treatment has its own set of complications including overdosing on the pain medications and also the habit forming potentials with the use of the medications used to treat chronic painful conditions including therapeutic classes classified as dangerous medications. Given the serious and fluctuating nature of pain level and instensity with extensive consideration for whenever pain changes, there is always the risk of prolonged functional impairment requiring close patient monitoring with regular assessments and reassessments and high level medical decision making at every office visit. The amount and complexity of data reviewed is high given the patient clinical presentation, labs,  data, radiology reports, and other tests as discussed during office visits. Pertinent data whether positive or negative were taken in consideration in the process of making this high level medical decision.

## 2024-01-04 ENCOUNTER — TELEPHONE (OUTPATIENT)
Dept: PAIN MEDICINE | Facility: CLINIC | Age: 69
End: 2024-01-04

## 2024-01-10 ENCOUNTER — OFFICE VISIT (OUTPATIENT)
Dept: PAIN MEDICINE | Facility: CLINIC | Age: 69
End: 2024-01-10
Payer: OTHER GOVERNMENT

## 2024-01-10 ENCOUNTER — LAB (OUTPATIENT)
Dept: LAB | Facility: LAB | Age: 69
End: 2024-01-10
Payer: MEDICARE

## 2024-01-10 DIAGNOSIS — S33.5XXA LUMBAR SPRAIN, INITIAL ENCOUNTER: ICD-10-CM

## 2024-01-10 DIAGNOSIS — S51.011A LACERATION OF RIGHT ELBOW, INITIAL ENCOUNTER: ICD-10-CM

## 2024-01-10 DIAGNOSIS — S43.402A SPRAIN OF LEFT SHOULDER, UNSPECIFIED SHOULDER SPRAIN TYPE, INITIAL ENCOUNTER: Primary | ICD-10-CM

## 2024-01-10 DIAGNOSIS — S80.02XA CONTUSION OF LEFT KNEE, INITIAL ENCOUNTER: ICD-10-CM

## 2024-01-10 LAB
AMPHETAMINES UR QL SCN: ABNORMAL
BARBITURATES UR QL SCN: ABNORMAL
BZE UR QL SCN: ABNORMAL
CANNABINOIDS UR QL SCN: ABNORMAL
CREAT UR-MCNC: 146 MG/DL (ref 20–320)
PCP UR QL SCN: ABNORMAL

## 2024-01-10 PROCEDURE — 80324 DRUG SCREEN AMPHETAMINES 1/2: CPT

## 2024-01-10 PROCEDURE — 80346 BENZODIAZEPINES1-12: CPT

## 2024-01-10 PROCEDURE — 80368 SEDATIVE HYPNOTICS: CPT

## 2024-01-10 PROCEDURE — 80354 DRUG SCREENING FENTANYL: CPT

## 2024-01-10 PROCEDURE — 80361 OPIATES 1 OR MORE: CPT

## 2024-01-10 PROCEDURE — 80307 DRUG TEST PRSMV CHEM ANLYZR: CPT

## 2024-01-10 PROCEDURE — 80373 DRUG SCREENING TRAMADOL: CPT

## 2024-01-10 PROCEDURE — 80365 DRUG SCREENING OXYCODONE: CPT

## 2024-01-10 PROCEDURE — 80358 DRUG SCREENING METHADONE: CPT

## 2024-01-10 PROCEDURE — 99214 OFFICE O/P EST MOD 30 MIN: CPT | Performed by: PHYSICAL MEDICINE & REHABILITATION

## 2024-01-10 PROCEDURE — 82570 ASSAY OF URINE CREATININE: CPT

## 2024-01-10 RX ORDER — OXYCODONE AND ACETAMINOPHEN 7.5; 325 MG/1; MG/1
1 TABLET ORAL EVERY 8 HOURS PRN
Qty: 84 TABLET | Refills: 0 | Status: SHIPPED | OUTPATIENT
Start: 2024-02-07 | End: 2024-02-15 | Stop reason: SDUPTHER

## 2024-01-10 RX ORDER — OXYCODONE AND ACETAMINOPHEN 7.5; 325 MG/1; MG/1
1 TABLET ORAL EVERY 8 HOURS PRN
Qty: 84 TABLET | Refills: 0 | Status: SHIPPED | OUTPATIENT
Start: 2024-01-10 | End: 2024-02-07

## 2024-01-10 NOTE — PROGRESS NOTES
DOL claim [id number]  DOI 08/30/2019       · Contusion of knee, left  (S80.02XA)   · Elbow laceration, right, initial encounter  (S51.011A)   · Sprain ligaments of lumbar spine S33.5XXA   . Unsp Sprain left shoulder joint, initial encounter S43.402A    Chief complaint  Back pain and lower limbs pain     History  Continues to have pain in the back and lower limbs The pain in the back is deep achy worse in the mid back area.  This is associated with tight muscle bands.  This limits the range of motion of the lumbar spine mainly in forward flexion.  The pain in the back is radiating around the side on the lateral aspect of the   thighs going down toward the lateral aspect of the legs into the lateral malleosli toward the lateral aspect of the feet towards the big toes.    This pain down to the lower limbs is more of a burning tingling sensation.  The pain in the   lower limbs worsens with bending forward or with any lifting, it improves with laying on the side and resting.  This is similar to the associated pain in the middle to lower back.  Denied any bowel or bladder incontinence.  With the worst pain there is tendency to catch the toe especially on carpeted area.  This occurs mostly when tired and toward the end of the day.    Also shoulder pain on the left The pain in the left shoulder is deep and stabbing.  It is associated with sharp sensation inside the shoulder joint mainly with movements.  The pain is continuous at rest and it gets worse with reaching forward and overhead.  Also reaching outward increases the pain.  There is no radiation of the pain to the upper limb.  The pain is associated with tight muscle bands around the shoulder blade.  These gets worse with shoulder stabilization like working at shoulder level or even doing any activity with the upper limb when it is not supported.  There is occasional sensation of fine cracking inside the shoulder joint when the shoulder position is changed rapidly.   Also occasionally there is a sensation of fullness inside the shoulder joint.  The left shoulder catches when it reaches at shoulder level.  With maneuvering and external rotation the shoulder can go up few more degrees.  The left shoulder gets tired quickly with any activity.         Pain level without medication is 8/10 , with the medication pain level 4/10.     The pain meds are helping control the pain and improving Activities of Daily living and quality of life and quality of sleep.    opioids treatment agreement Jan 2024  Oarrs pulled and scanned in the chart  no concerns  last urine toxicology testing earlier this year and it was compliant we will repeat  Xray updated spine and shoulder  ORT Score is  0  Pain pathology and pain generators spine and shoulder and knee   Modalities tried injection, surgery, physical therapy, TENS unit, nonsteroidal anti-inflammatory medication       Denied any fever or chills. No weight loss and no night sweats. No cough or sputum production. No diarrhea   The constipation has been responding to fibers and over the counter medications.     No bladder and bowel incontinence and no other changes in bladder and bowel. No skin changes.  Reports tiredness and fatigability only if the pain is not controlled.   Denied opioids diversion and abuse and denies alcoholism. Denies overuse of the pain medications.    The control of the pain with the pain medications is helping the control of the symptoms and allowing the function and activities of daily living, enjoyment of life, improving the quality of life and sleep with less interruption by the pain. The goal is symptomatic control of the nonmalignant chronic pain and not to repair the permanent damage in the tissues inducing the chronic pain conditions. We are aiming to shift the focus from the nonmalignant chronic pain to other aspects of life by symptomatically treating this chronic pain. If this pain is not treated it will lead to  major morbidity and it is also associated with increased risks of mortality. The patient understands those very clearly and also understand high risks of morbidity and mortality if not strictly adherent to the treatment recommendations and reporting any associated side effects. Also patient understand the full responsibility associated with these medications to avoid abuse or overuse or any use of these medications for anything besides treating the patient's own chronic pain and nothing else under any circumstances.        Physical examination  Awake, alert and oriented for time place and persons   declined Chaperone for the visit and was adequately  draped for the exam.      The left shoulder physical examination showed mild atrophy of the left supraspinatus compared to the right side.  Impingement at 90 degrees in forward flexion and abduction.  This improved by few degrees upon external rotation.  Tight muscle bands and trigger points around the shoulder blade muscles.  No overt shoulder instability but tenderness on palpation of the subacromial area.  Negative cervical root tension sign.  Left shoulder strength is 4/5 in all directions.  No distal sensorimotor deficits associated with the left shoulder.     There is decreased sensory to light touch on the lateral aspects of the thighs and around the   knee going down to the lateral aspect of the legs to the   lateral malleoli into the dorsum of the feet..    Deep tendon reflexes is present for the patellar tendons bilaterally.  Achilles reflexes are present bilaterally and symmetric.    Medial Hamstrings reflex is decreased bilaterally  Plantar cutaneous are downgoing.  Ankle dorsiflexion is 5/5 bilaterally.  Plantar flexion of the ankles are 5/5 bilaterally.  Big toe extension is 4/5 bilaterally  Negative Tinel's sign over the right peroneal nerve at the fibular neck.  Dangelo sign for axial loading and global rotation are negative.  No aberrant pain behavior.      Diagnosis  Problem List Items Addressed This Visit       Contusion of left knee    Relevant Medications    oxyCODONE-acetaminophen (Percocet) 7.5-325 mg tablet    oxyCODONE-acetaminophen (Percocet) 7.5-325 mg tablet (Start on 2/7/2024)    Laceration of right elbow    Relevant Medications    oxyCODONE-acetaminophen (Percocet) 7.5-325 mg tablet    oxyCODONE-acetaminophen (Percocet) 7.5-325 mg tablet (Start on 2/7/2024)    Lumbar sprain    Relevant Medications    oxyCODONE-acetaminophen (Percocet) 7.5-325 mg tablet    oxyCODONE-acetaminophen (Percocet) 7.5-325 mg tablet (Start on 2/7/2024)    Sprain of shoulder, left - Primary    Relevant Medications    oxyCODONE-acetaminophen (Percocet) 7.5-325 mg tablet    oxyCODONE-acetaminophen (Percocet) 7.5-325 mg tablet (Start on 2/7/2024)        Plan  Reviewed the pain generators.  Went over the types of pain with neuropathic and nociceptive and different pathologies and therapeutic modalities. Discussed the mechanism of action of interventions from acupuncture, physical therapy , regular exercises, injections, botox, spinal cord stimulation, and role of surgery     Went over pathology of the intervertebral disc displacement and the anatomical relation to the Nerve roots and relation to the radicular symptoms. Went over treatment modalities with conservative treatment including acupuncture   and epidural steroid injection with fluoroscopy guidance and last resort of surgery    Based on the above findings and the clinical response to the opioids medications and improvement of the activities of daily living, sleep, and work performance. We made this complex decision to continue the opioids therapy in light of the evidence of the patient's responsibility in using the pain medications as prescribed for the nonmalignant chronic pain condition. We discussed about the use of the pain medications to treat the symptoms of chronic nonmalignant pain and we are not trying the repair  the permanent damage in the tissues, rather we are trying to control the symptoms induced by the permanent damage to the tissues inducing the chronic pain condition and resulting disability. I explained the difference and discussed it with the patient and stressed the importance of knowing the difference especially because of the potential side effects and the potential addicting effect and habit forming nature of the dangerous drugs we are using to treat the symptoms of the chronic pain.      We discussed that we are prescribing the medications on good quinn and legitimate medical reason.     We reviewed the side effects and precautions of opioids prescriptions as discussed in the opioids treatment agreement.    realizes the interaction between the therapeutic classes including the respiratory depression and potential death     Random drug testing twice in 6 months we will submit     Oxycodone 7.5 mg 3 times a day . She has a narcan at home know how and when to use it if needed.     Discussed about NSAIDS and I explained about the opioids sparing effect to allow keeping the opioids dose at minimal effective dose.   I went over the potential side effects of the NSAIDS on the gastrointestinal, renal and cardiovascular systems.      I detailed the side effects from the acetaminophen in the medication and made aware of those. I also explained about the cumulative effects on the organs and mainly the liver.     Given the opioids therapy , we discussed about the risk for accidental over dose on the pain medications, either for patient or other household. I went over the mechanism of action and mode of use of the Naloxone according to the  recommendations. I will provide a prescription for a kit.     Follow-up 8 weeks or earlier if needed     The level of clinical decision making in this office visit,  is high, given the high risks of complications with the morbidity and mortality due to the fact that acute  and chronic pain may pose a threat to life and bodily function, if under treated, poorly treated, or with failure to maintain adequate treatment and timely medical follow up. Additionally over treatment has its own set of complications including overdosing on the pain medications and also the habit forming potentials with the use of the medications used to treat chronic painful conditions including therapeutic classes classified as dangerous medications. Given the serious and fluctuating nature of pain level and instensity with extensive consideration for whenever pain changes, there is always the risk of prolonged functional impairment requiring close patient monitoring with regular assessments and reassessments and high level medical decision making at every office visit. The amount and complexity of data reviewed is high given the patient clinical presentation, labs,  data, radiology reports, and other tests as discussed during office visits. Pertinent data whether positive or negative were taken in consideration in the process of making this high level medical decision.

## 2024-01-15 LAB
AMPHET UR-MCNC: <50 NG/ML
MDA UR-MCNC: <200 NG/ML
MDEA UR-MCNC: <200 NG/ML
MDMA UR-MCNC: <200 NG/ML
METHAMPHET UR-MCNC: <200 NG/ML
PHENTERMINE UR CFM-MCNC: <200 NG/ML

## 2024-01-16 LAB
1OH-MIDAZOLAM UR CFM-MCNC: <25 NG/ML
6MAM UR CFM-MCNC: <25 NG/ML
7AMINOCLONAZEPAM UR CFM-MCNC: <25 NG/ML
A-OH ALPRAZ UR CFM-MCNC: <25 NG/ML
ALPRAZ UR CFM-MCNC: <25 NG/ML
CHLORDIAZEP UR CFM-MCNC: <25 NG/ML
CLONAZEPAM UR CFM-MCNC: <25 NG/ML
CODEINE UR CFM-MCNC: <50 NG/ML
DIAZEPAM UR CFM-MCNC: <25 NG/ML
EDDP UR CFM-MCNC: <25 NG/ML
FENTANYL UR CFM-MCNC: <2.5 NG/ML
HYDROCODONE CTO UR CFM-MCNC: <25 NG/ML
HYDROMORPHONE UR CFM-MCNC: <25 NG/ML
LORAZEPAM UR CFM-MCNC: <25 NG/ML
METHADONE UR CFM-MCNC: <25 NG/ML
MIDAZOLAM UR CFM-MCNC: <25 NG/ML
MORPHINE UR CFM-MCNC: <50 NG/ML
NORDIAZEPAM UR CFM-MCNC: <25 NG/ML
NORFENTANYL UR CFM-MCNC: <2.5 NG/ML
NORHYDROCODONE UR CFM-MCNC: <25 NG/ML
NOROXYCODONE UR CFM-MCNC: >1000 NG/ML
NORTRAMADOL UR-MCNC: <50 NG/ML
OXAZEPAM UR CFM-MCNC: <25 NG/ML
OXYCODONE UR CFM-MCNC: 1846 NG/ML
OXYMORPHONE UR CFM-MCNC: 197 NG/ML
TEMAZEPAM UR CFM-MCNC: <25 NG/ML
TRAMADOL UR CFM-MCNC: <50 NG/ML
ZOLPIDEM UR CFM-MCNC: <25 NG/ML
ZOLPIDEM UR-MCNC: <25 NG/ML

## 2024-02-06 ENCOUNTER — APPOINTMENT (OUTPATIENT)
Dept: RADIOLOGY | Facility: CLINIC | Age: 69
End: 2024-02-06
Payer: MEDICARE

## 2024-02-08 NOTE — PROGRESS NOTES
Sonam Saab female   1955 68 y.o.   [id number]      Chief Complaint  Left breast mass    History Of Present Illness  Sonam Saab is a 68 y.o. AA female [facility] breast mass. She first noticed the lump. She denies trauma to the breast. She has no family history of breast cancer. She denies breast surgery or biopsy.    BREAST IMAGIN2023 Bilateral screening mammogram, indicates BI-RADS Category 0. Right breast asymmetry warranting additional views. 2023 Right diagnostic mammogram, indicates BI-RADS Category 2.     REPRODUCTIVE HISTORY: menarche age, GP, first birth age, , OCP's, menopause age, no HRT, scattered fibroglandular tissue    FAMILY CANCER HISTORY:      Surgical History  She has a past surgical history that includes Total abdominal hysterectomy w/ bilateral salpingoophorectomy (2013); Cholecystectomy (2013); Gastric restriction surgery (2013); Other surgical history (2019); Adenoidectomy (11/10/2015); Sinus surgery (11/10/2015); Tonsillectomy (11/10/2015); Esophagogastroduodenoscopy (2014); and Colonoscopy (10/22/2013).     Social History  She reports that she has quit smoking. Her smoking use included cigarettes. She does not have any smokeless tobacco history on file. No history on file for alcohol use and drug use.    Family History  Family History   Problem Relation Name Age of Onset    Heart failure Mother      COPD Mother      COPD Sister      Mitral valve prolapse Sister      Breast cancer Maternal Grandmother      Colon cancer Maternal Grandmother      Diabetes Other      Arthritis Other      Colon cancer Other      Hypertension Other      Hyperlipidemia Other          Allergies  Ace inhibitors, Codeine, Latex, and Ragweed    Medications  Current Outpatient Medications   Medication Instructions    albuterol (ProAir HFA) 90 mcg/actuation inhaler 1-2 puffs, inhalation, Every 4 hours PRN    albuterol 2.5 mg,  inhalation, Every 4 hours PRN    amLODIPine (NORVASC) 5 mg, oral, Daily    ammonium lactate (Amlactin) 12 % cream 1 Application, Topical, 2 times daily    aspirin 81 mg, oral, Daily    baclofen (LIORESAL) 10 mg, oral, 2 times daily    clobetasol (Temovate) 0.05 % ointment 1 Application, Topical, 2 times daily PRN    clotrimazole-betamethasone (Lotrisone) cream 1 Application, Topical, 2 times daily    furosemide (LASIX) 20 mg, oral, Daily    hydroxychloroquine (PLAQUENIL) 200 mg, oral, 2 times daily    loratadine (CLARITIN) 10 mg, oral, Daily    lubiprostone (AMITIZA) 24 mcg, oral, 2 times daily with meals    metoprolol succinate XL (TOPROL-XL) 200 mg, oral, Daily    naloxone (NARCAN) 4 mg, nasal, As needed, May repeat every 2-3 minutes if needed, alternating nostrils, until medical assistance becomes available.    oxyCODONE-acetaminophen (Percocet) 7.5-325 mg tablet 1 tablet, oral, Every 8 hours PRN    pravastatin (PRAVACHOL) 20 mg, oral, Daily    RABEprazole (ACIPHEX) 20 mg, oral, Daily    traZODone (DESYREL) 50 mg, oral, Nightly         REVIEW OF SYSTEMS    Constitutional:  Negative for appetite change, fatigue, fever and unexpected weight change.   HENT:  Negative for ear pain, hearing loss, nosebleeds, sore throat and trouble swallowing.    Eyes:  Negative for discharge, itching and visual disturbance.   Respiratory:  Negative for cough, chest tightness and shortness of breath.    Cardiovascular:  Negative for chest pain, palpitations and leg swelling.   Breast: as indicated in HPI  Gastrointestinal:  Negative for abdominal pain, constipation, diarrhea and nausea.   Endocrine: Negative for cold intolerance and heat intolerance.   Genitourinary:  Negative for dysuria, frequency, hematuria, pelvic pain and vaginal bleeding.   Musculoskeletal:  Negative for arthralgias, back pain, gait problem, joint swelling and myalgias.   Skin:  Negative for color change and rash.   Allergic/Immunologic: Negative for  environmental allergies and food allergies.   Neurological:  Negative for dizziness, tremors, speech difficulty, weakness, numbness and headaches.   Hematological:  Does not bruise/bleed easily.   Psychiatric/Behavioral:  Negative for agitation, dysphoric mood and sleep disturbance. The patient is not nervous/anxious.         Past Medical History  She has a past medical history of Depression, unspecified (04/02/2019), Essential (primary) hypertension (11/05/2013), Pain in unspecified knee, Pain in unspecified limb, Personal history of other diseases of the digestive system, Personal history of other diseases of the digestive system, Personal history of other diseases of the musculoskeletal system and connective tissue, Personal history of other diseases of the musculoskeletal system and connective tissue, Personal history of other diseases of the nervous system and sense organs, Personal history of other diseases of the nervous system and sense organs, Personal history of other diseases of the nervous system and sense organs, Personal history of other specified conditions (07/16/2018), Personal history of other specified conditions, Personal history of other specified conditions, Sprain of ligaments of lumbar spine, initial encounter (04/18/2013), and Unspecified asthma, uncomplicated (06/22/2022).     Physical Exam  Patient is alert and oriented x3 and in a relaxed and appropriate mood. Her gait is steady and hand grasps are equal. Sclera is clear. The breasts are nearly symmetrical. The tissue is soft without palpable abnormalities, discrete nodules or masses. The skin and nipples appear normal. There is no cervical, supraclavicular or axillary lymphadenopathy. Heart rate and rhythm normal, S1 and S2 appreciated. The lungs are clear to auscultation bilaterally. Abdomen is soft and non-tender.       Physical Exam     Last Recorded Vitals  There were no vitals filed for this visit.    Relevant Results   Time was  spent viewing digital images of the radiology testing with the patient. I explained the results in depth, along with suggested explanation for follow up recommendations based on the testing results. BI-RADS Category     Imaging        Assessment/Plan   Clinical exam and imaging, left breast, no family history of breast cancer, scattered fibroglandular tissue    Plan:     Patient Discussion/Summary  Your clinical examination and imaging are normal. Please return in one year for bilateral screening mammogram and office visit or sooner if you have any problems or concerns.     You can see your health information, review clinical summaries from office visits & test results online when you follow your health with MY  Chart, a personal health record. To sign up go to [email or web address]. If you need assistance with signing up or trouble getting into your account call [facility] Patient Line 24/7 at [phone].    My office phone number is [phone] if you need to get in touch with me or have additional questions or concerns. Thank you for choosing [facility] and trusting me as your healthcare provider. I look forward to seeing you again at your next office visit. I am honored to be a provider on your health care team and I remain dedicated to helping you achieve your health goals.      Olinda To, APRN-CNP

## 2024-02-12 PROBLEM — I89.0 LYMPHEDEMA: Status: ACTIVE | Noted: 2023-06-15

## 2024-02-12 PROBLEM — J45.909 ASTHMA (HHS-HCC): Status: ACTIVE | Noted: 2023-02-22

## 2024-02-12 PROBLEM — R19.7 DIARRHEA: Status: ACTIVE | Noted: 2022-11-22

## 2024-02-12 PROBLEM — E11.69 DIABETES MELLITUS TYPE 2 IN OBESE: Status: ACTIVE | Noted: 2024-02-12

## 2024-02-12 PROBLEM — R05.9 COUGH, UNSPECIFIED: Status: ACTIVE | Noted: 2022-06-08

## 2024-02-12 PROBLEM — H93.8X9 SENSATION OF PLUGGED EAR: Status: ACTIVE | Noted: 2024-02-12

## 2024-02-12 PROBLEM — J98.4 OTHER DISORDERS OF LUNG: Status: ACTIVE | Noted: 2022-10-26

## 2024-02-12 PROBLEM — R06.83 SNORING: Status: ACTIVE | Noted: 2024-02-12

## 2024-02-12 PROBLEM — K14.6 BURNING MOUTH SYNDROME: Status: ACTIVE | Noted: 2021-09-29

## 2024-02-12 PROBLEM — D13.0 LEIOMYOMA OF ESOPHAGUS: Status: ACTIVE | Noted: 2021-10-07

## 2024-02-12 PROBLEM — B37.31 ACUTE CANDIDIASIS OF VULVA AND VAGINA: Status: ACTIVE | Noted: 2022-11-22

## 2024-02-12 PROBLEM — K59.03 DRUG-INDUCED CONSTIPATION: Status: ACTIVE | Noted: 2024-02-12

## 2024-02-12 PROBLEM — J84.9 INTERSTITIAL LUNG DISEASE (MULTI): Status: ACTIVE | Noted: 2024-02-12

## 2024-02-12 PROBLEM — R06.02 SOB (SHORTNESS OF BREATH) ON EXERTION: Status: ACTIVE | Noted: 2024-02-12

## 2024-02-12 PROBLEM — R91.8 LUNG MASS: Status: ACTIVE | Noted: 2021-09-29

## 2024-02-12 PROBLEM — E66.813 OBESITY, CLASS III, BMI 40-49.9 (MORBID OBESITY): Status: ACTIVE | Noted: 2023-07-12

## 2024-02-12 PROBLEM — N30.91 HEMORRHAGIC CYSTITIS: Status: ACTIVE | Noted: 2024-02-12

## 2024-02-12 PROBLEM — K21.9 GERD (GASTROESOPHAGEAL REFLUX DISEASE): Status: ACTIVE | Noted: 2017-10-11

## 2024-02-12 PROBLEM — E04.1 NONTOXIC SINGLE THYROID NODULE: Status: ACTIVE | Noted: 2023-08-15

## 2024-02-12 PROBLEM — M17.12 OSTEOARTHRITIS OF LEFT KNEE: Status: ACTIVE | Noted: 2018-11-19

## 2024-02-12 PROBLEM — K22.9 ESOPHAGEAL LESION: Status: ACTIVE | Noted: 2019-10-09

## 2024-02-12 PROBLEM — H25.812 COMBINED FORM OF AGE-RELATED CATARACT, LEFT EYE: Status: ACTIVE | Noted: 2023-04-18

## 2024-02-12 PROBLEM — E66.9 DIABETES MELLITUS TYPE 2 IN OBESE: Status: ACTIVE | Noted: 2024-02-12

## 2024-02-12 PROBLEM — Z99.81 ON HOME OXYGEN THERAPY: Status: ACTIVE | Noted: 2019-06-07

## 2024-02-12 PROBLEM — G47.00 INSOMNIA, UNSPECIFIED: Status: ACTIVE | Noted: 2022-12-03

## 2024-02-12 PROBLEM — Z82.3 FAMILY HISTORY OF STROKE: Status: ACTIVE | Noted: 2022-12-03

## 2024-02-12 PROBLEM — R09.02 HYPOXEMIA: Status: ACTIVE | Noted: 2022-06-08

## 2024-02-12 PROBLEM — M79.671 BILATERAL LEG AND FOOT PAIN: Status: ACTIVE | Noted: 2024-02-12

## 2024-02-12 PROBLEM — M25.562 LEFT KNEE PAIN: Status: ACTIVE | Noted: 2024-02-12

## 2024-02-12 PROBLEM — Z86.69 HISTORY OF GLAUCOMA: Status: ACTIVE | Noted: 2024-02-12

## 2024-02-12 PROBLEM — R93.89 ABNORMAL CHEST CT: Status: ACTIVE | Noted: 2024-02-12

## 2024-02-12 PROBLEM — R60.9 EDEMA, UNSPECIFIED: Status: ACTIVE | Noted: 2022-05-16

## 2024-02-12 PROBLEM — R20.2 PARESTHESIA: Status: ACTIVE | Noted: 2024-02-12

## 2024-02-12 PROBLEM — Z79.82 LONG TERM (CURRENT) USE OF ASPIRIN: Status: ACTIVE | Noted: 2022-11-22

## 2024-02-12 PROBLEM — Z12.31 VISIT FOR SCREENING MAMMOGRAM: Status: ACTIVE | Noted: 2024-02-12

## 2024-02-12 PROBLEM — N18.30 STAGE 3 CHRONIC KIDNEY DISEASE (MULTI): Status: ACTIVE | Noted: 2023-10-30

## 2024-02-12 PROBLEM — S30.1XXA ABDOMINAL WALL CONTUSION: Status: ACTIVE | Noted: 2024-02-12

## 2024-02-12 PROBLEM — Z82.49 FAMILY HISTORY OF ISCHEMIC HEART DISEASE AND OTHER DISEASES OF THE CIRCULATORY SYSTEM: Status: ACTIVE | Noted: 2022-12-03

## 2024-02-12 PROBLEM — E78.5 HYPERLIPIDEMIA: Status: ACTIVE | Noted: 2019-10-16

## 2024-02-12 PROBLEM — R53.83 FATIGUE: Status: ACTIVE | Noted: 2022-10-02

## 2024-02-12 PROBLEM — J96.20 ACUTE ON CHRONIC RESPIRATORY FAILURE (MULTI): Status: ACTIVE | Noted: 2022-06-27

## 2024-02-12 PROBLEM — Z96.1 PSEUDOPHAKIA, RIGHT EYE: Status: ACTIVE | Noted: 2023-04-18

## 2024-02-12 PROBLEM — Z79.51 LONG TERM (CURRENT) USE OF INHALED STEROIDS: Status: ACTIVE | Noted: 2022-11-22

## 2024-02-12 PROBLEM — B34.2 CORONAVIRUS INFECTION: Status: ACTIVE | Noted: 2022-10-02

## 2024-02-12 PROBLEM — Z96.21 COCHLEAR PROSTHESIS IN SITU: Status: ACTIVE | Noted: 2021-09-29

## 2024-02-12 PROBLEM — H40.9 GLAUCOMA: Status: ACTIVE | Noted: 2022-11-22

## 2024-02-12 PROBLEM — K21.00 REFLUX ESOPHAGITIS: Status: ACTIVE | Noted: 2024-02-12

## 2024-02-12 PROBLEM — R32 INCONTINENCE: Status: ACTIVE | Noted: 2021-09-29

## 2024-02-12 PROBLEM — M25.519 SHOULDER PAIN: Status: ACTIVE | Noted: 2024-02-12

## 2024-02-12 PROBLEM — N39.0 ACUTE URINARY TRACT INFECTION: Status: ACTIVE | Noted: 2022-09-22

## 2024-02-12 PROBLEM — R79.89 ABNORMAL LIVER FUNCTION TESTS: Status: ACTIVE | Noted: 2024-02-12

## 2024-02-12 PROBLEM — I25.10 CAD (CORONARY ARTERY DISEASE): Status: ACTIVE | Noted: 2019-10-16

## 2024-02-12 PROBLEM — H90.3 ASYMMETRICAL SENSORINEURAL HEARING LOSS: Status: ACTIVE | Noted: 2023-06-21

## 2024-02-12 PROBLEM — I50.9 HEART FAILURE (MULTI): Status: ACTIVE | Noted: 2022-11-22

## 2024-02-12 PROBLEM — M25.551 RIGHT HIP PAIN: Status: ACTIVE | Noted: 2024-02-12

## 2024-02-12 PROBLEM — E66.01 OBESITY, CLASS III, BMI 40-49.9 (MORBID OBESITY) (MULTI): Status: ACTIVE | Noted: 2023-07-12

## 2024-02-12 PROBLEM — N95.2 ATROPHIC VAGINITIS: Status: ACTIVE | Noted: 2022-09-22

## 2024-02-12 PROBLEM — Z83.6: Status: ACTIVE | Noted: 2022-12-03

## 2024-02-12 PROBLEM — F90.9 LONG-TERM CURRENT USE OF DRUG THERAPY FOR ATTENTION DEFICIT HYPERACTIVITY DISORDER (ADHD): Status: ACTIVE | Noted: 2024-02-12

## 2024-02-12 PROBLEM — Z28.310 UNVACCINATED FOR COVID-19: Status: ACTIVE | Noted: 2022-11-22

## 2024-02-12 PROBLEM — Z96.21 COCHLEAR IMPLANT STATUS: Status: ACTIVE | Noted: 2023-06-21

## 2024-02-12 PROBLEM — E53.8 FOLATE DEFICIENCY: Status: ACTIVE | Noted: 2024-02-12

## 2024-02-12 PROBLEM — H25.12 NUCLEAR SENILE CATARACT OF LEFT EYE: Status: ACTIVE | Noted: 2023-10-13

## 2024-02-12 PROBLEM — G89.29 OTHER CHRONIC PAIN: Status: ACTIVE | Noted: 2022-12-03

## 2024-02-12 PROBLEM — I71.21 ANEURYSM OF THE ASCENDING AORTA, WITHOUT RUPTURE (CMS-HCC): Status: ACTIVE | Noted: 2022-11-22

## 2024-02-12 PROBLEM — N76.0 ACUTE VAGINITIS: Status: ACTIVE | Noted: 2024-02-12

## 2024-02-12 PROBLEM — M79.606 PAIN OF LOWER EXTREMITY: Status: ACTIVE | Noted: 2024-02-12

## 2024-02-12 PROBLEM — K58.9 IRRITABLE BOWEL SYNDROME WITHOUT DIARRHEA: Status: ACTIVE | Noted: 2022-12-03

## 2024-02-12 PROBLEM — R73.09 INCREASED GLUCOSE LEVEL: Status: ACTIVE | Noted: 2022-10-02

## 2024-02-12 PROBLEM — R73.09 ELEVATED GLUCOSE: Status: ACTIVE | Noted: 2024-02-12

## 2024-02-12 PROBLEM — Z80.0 FAMILY HISTORY OF MALIGNANT NEOPLASM OF DIGESTIVE ORGANS: Status: ACTIVE | Noted: 2022-12-03

## 2024-02-12 PROBLEM — R07.89 ATYPICAL CHEST PAIN: Status: ACTIVE | Noted: 2024-02-12

## 2024-02-12 PROBLEM — M79.672 BILATERAL LEG AND FOOT PAIN: Status: ACTIVE | Noted: 2024-02-12

## 2024-02-12 PROBLEM — R35.0 URINE FREQUENCY: Status: ACTIVE | Noted: 2022-08-29

## 2024-02-12 PROBLEM — I11.0 HYPERTENSIVE HEART DISEASE WITH HEART FAILURE (MULTI): Status: ACTIVE | Noted: 2022-12-03

## 2024-02-12 PROBLEM — M25.559 ARTHRALGIA OF HIP: Status: ACTIVE | Noted: 2024-02-12

## 2024-02-12 PROBLEM — H57.10 PAIN IN EYE: Status: ACTIVE | Noted: 2024-02-12

## 2024-02-12 PROBLEM — M79.10 MYALGIA, UNSPECIFIED SITE: Status: ACTIVE | Noted: 2022-10-02

## 2024-02-12 PROBLEM — H40.032 ANATOMICAL NARROW ANGLE OF LEFT EYE: Status: ACTIVE | Noted: 2023-04-18

## 2024-02-12 PROBLEM — M19.012 PRIMARY OSTEOARTHRITIS OF LEFT SHOULDER: Status: ACTIVE | Noted: 2018-10-18

## 2024-02-12 PROBLEM — G89.29 CHRONIC BACK PAIN: Status: ACTIVE | Noted: 2019-06-07

## 2024-02-12 PROBLEM — E55.9 VITAMIN D DEFICIENCY: Status: ACTIVE | Noted: 2024-02-12

## 2024-02-12 PROBLEM — R11.0 NAUSEA IN ADULT: Status: ACTIVE | Noted: 2024-02-12

## 2024-02-12 PROBLEM — R10.13 ABDOMINAL PAIN, EPIGASTRIC: Status: ACTIVE | Noted: 2024-02-12

## 2024-02-12 PROBLEM — J84.9 INTERSTITIAL PULMONARY DISEASE, UNSPECIFIED (MULTI): Status: ACTIVE | Noted: 2022-11-22

## 2024-02-12 PROBLEM — L20.9 ATOPIC DERMATITIS: Status: ACTIVE | Noted: 2024-02-12

## 2024-02-12 PROBLEM — Z20.822 CONTACT WITH AND (SUSPECTED) EXPOSURE TO COVID-19: Status: ACTIVE | Noted: 2022-10-02

## 2024-02-12 PROBLEM — M54.9 CHRONIC BACK PAIN: Status: ACTIVE | Noted: 2019-06-07

## 2024-02-12 PROBLEM — Z86.73 PERSONAL HISTORY OF TRANSIENT ISCHEMIC ATTACK (TIA), AND CEREBRAL INFARCTION WITHOUT RESIDUAL DEFICITS: Status: ACTIVE | Noted: 2018-11-30

## 2024-02-12 PROBLEM — R09.02 HYPOXIA: Status: ACTIVE | Noted: 2024-02-12

## 2024-02-12 PROBLEM — R73.9 HYPERGLYCEMIA: Status: ACTIVE | Noted: 2024-02-12

## 2024-02-12 PROBLEM — N39.41 URGE INCONTINENCE: Status: ACTIVE | Noted: 2024-02-12

## 2024-02-12 PROBLEM — J98.4 RESTRICTIVE LUNG DISEASE: Status: ACTIVE | Noted: 2021-09-29

## 2024-02-12 PROBLEM — Z79.899 LONG-TERM CURRENT USE OF DRUG THERAPY FOR ATTENTION DEFICIT HYPERACTIVITY DISORDER (ADHD): Status: ACTIVE | Noted: 2024-02-12

## 2024-02-12 PROBLEM — R06.00 DYSPNEA: Status: ACTIVE | Noted: 2024-02-12

## 2024-02-12 PROBLEM — M25.512 LEFT SHOULDER PAIN: Status: ACTIVE | Noted: 2024-02-12

## 2024-02-12 PROBLEM — D47.2 MGUS (MONOCLONAL GAMMOPATHY OF UNKNOWN SIGNIFICANCE): Status: ACTIVE | Noted: 2023-09-05

## 2024-02-12 PROBLEM — F33.0 MILD EPISODE OF RECURRENT MAJOR DEPRESSIVE DISORDER (CMS-HCC): Status: ACTIVE | Noted: 2023-10-30

## 2024-02-12 PROBLEM — N39.0 ACUTE UTI: Status: ACTIVE | Noted: 2024-02-12

## 2024-02-12 PROBLEM — R73.09 ABNORMAL GLUCOSE LEVEL: Status: ACTIVE | Noted: 2023-02-22

## 2024-02-12 PROBLEM — N32.81 OVERACTIVE BLADDER: Status: ACTIVE | Noted: 2018-11-19

## 2024-02-12 PROBLEM — Z98.890: Status: ACTIVE | Noted: 2023-04-18

## 2024-02-12 PROBLEM — Z91.048 OTHER NONMEDICINAL SUBSTANCE ALLERGY STATUS: Status: ACTIVE | Noted: 2022-12-03

## 2024-02-12 PROBLEM — R00.2 PALPITATIONS: Status: ACTIVE | Noted: 2024-02-12

## 2024-02-12 PROBLEM — I45.9 CONDUCTION DISORDER OF THE HEART: Status: ACTIVE | Noted: 2021-09-29

## 2024-02-12 PROBLEM — Z87.891 PERSONAL HISTORY OF NICOTINE DEPENDENCE: Status: ACTIVE | Noted: 2022-10-26

## 2024-02-12 PROBLEM — M79.89 SYMPTOM OF LEG SWELLING: Status: ACTIVE | Noted: 2024-02-12

## 2024-02-12 PROBLEM — B35.4 TINEA CORPORIS: Status: ACTIVE | Noted: 2022-11-22

## 2024-02-12 PROBLEM — M79.604 BILATERAL LEG AND FOOT PAIN: Status: ACTIVE | Noted: 2024-02-12

## 2024-02-12 PROBLEM — M79.89 LEG SWELLING: Status: ACTIVE | Noted: 2024-02-12

## 2024-02-12 PROBLEM — H90.3 SENSORINEURAL HEARING LOSS (SNHL) OF BOTH EARS: Status: ACTIVE | Noted: 2018-09-04

## 2024-02-12 PROBLEM — J44.9 MODERATE CHRONIC OBSTRUCTIVE PULMONARY DISEASE (MULTI): Status: ACTIVE | Noted: 2022-11-22

## 2024-02-12 PROBLEM — R94.8 ABNORMAL PET SCAN, MEDIASTINUM: Status: ACTIVE | Noted: 2024-02-12

## 2024-02-12 PROBLEM — M79.605 BILATERAL LEG AND FOOT PAIN: Status: ACTIVE | Noted: 2024-02-12

## 2024-02-12 PROBLEM — G57.11 MERALGIA PARESTHETICA OF RIGHT SIDE: Status: ACTIVE | Noted: 2024-02-12

## 2024-02-12 PROBLEM — J96.01 ACUTE RESPIRATORY FAILURE WITH HYPOXIA (MULTI): Status: ACTIVE | Noted: 2022-12-03

## 2024-02-12 PROBLEM — B96.1 KLEBSIELLA PNEUMONIAE (K. PNEUMONIAE) AS THE CAUSE OF DISEASES CLASSIFIED ELSEWHERE: Status: ACTIVE | Noted: 2022-11-22

## 2024-02-12 PROBLEM — S83.92XA LEFT KNEE SPRAIN: Status: ACTIVE | Noted: 2024-02-12

## 2024-02-12 PROBLEM — F39 MOOD DISORDER (CMS-HCC): Status: ACTIVE | Noted: 2022-11-22

## 2024-02-12 PROBLEM — H93.8X3 BLOCKED EAR, BILATERAL: Status: ACTIVE | Noted: 2024-02-12

## 2024-02-12 PROBLEM — R29.6 REPEATED FALLS: Status: ACTIVE | Noted: 2022-12-03

## 2024-02-12 PROBLEM — M25.569 ARTHRALGIA OF KNEE: Status: ACTIVE | Noted: 2024-02-12

## 2024-02-12 PROBLEM — Z90.710 ACQUIRED ABSENCE OF BOTH CERVIX AND UTERUS: Status: ACTIVE | Noted: 2022-12-03

## 2024-02-12 PROBLEM — A08.4 VIRAL GASTROENTERITIS: Status: ACTIVE | Noted: 2024-02-12

## 2024-02-12 PROBLEM — Z90.722 ACQUIRED ABSENCE OF OVARIES, BILATERAL: Status: ACTIVE | Noted: 2022-12-03

## 2024-02-12 PROBLEM — S80.00XA CONTUSION OF KNEE: Status: ACTIVE | Noted: 2023-10-31

## 2024-02-13 RX ORDER — LOSARTAN POTASSIUM AND HYDROCHLOROTHIAZIDE 12.5; 5 MG/1; MG/1
1 TABLET ORAL
COMMUNITY
Start: 2023-11-09 | End: 2024-11-08

## 2024-02-13 RX ORDER — DICYCLOMINE HYDROCHLORIDE 10 MG/1
CAPSULE ORAL
COMMUNITY
Start: 2020-11-02 | End: 2024-02-16 | Stop reason: WASHOUT

## 2024-02-13 RX ORDER — MIRABEGRON 50 MG/1
TABLET, EXTENDED RELEASE ORAL
COMMUNITY
Start: 2022-05-11

## 2024-02-13 RX ORDER — FLUTICASONE PROPIONATE AND SALMETEROL 100; 50 UG/1; UG/1
1 POWDER RESPIRATORY (INHALATION) EVERY 12 HOURS
COMMUNITY

## 2024-02-13 RX ORDER — BUDESONIDE AND FORMOTEROL FUMARATE DIHYDRATE 80; 4.5 UG/1; UG/1
AEROSOL RESPIRATORY (INHALATION) EVERY 12 HOURS
COMMUNITY
Start: 2022-07-20

## 2024-02-13 RX ORDER — BUPROPION HYDROCHLORIDE 150 MG/1
150 TABLET ORAL
COMMUNITY
Start: 2024-01-04 | End: 2025-01-03

## 2024-02-13 RX ORDER — OXYBUTYNIN CHLORIDE 5 MG/1
TABLET ORAL
COMMUNITY
Start: 2022-06-14

## 2024-02-13 RX ORDER — NITROFURANTOIN 25; 75 MG/1; MG/1
1 CAPSULE ORAL EVERY 12 HOURS
COMMUNITY
Start: 2023-01-12

## 2024-02-13 RX ORDER — ECONAZOLE NITRATE 10 MG/G
CREAM TOPICAL EVERY 12 HOURS
COMMUNITY
Start: 2013-10-22

## 2024-02-13 RX ORDER — HYDROXYZINE HYDROCHLORIDE 25 MG/1
TABLET, FILM COATED ORAL
COMMUNITY
Start: 2023-08-19

## 2024-02-13 RX ORDER — BETAMETHASONE DIPROPIONATE 0.5 MG/G
CREAM TOPICAL
COMMUNITY
Start: 2021-07-27

## 2024-02-13 RX ORDER — BUPROPION HYDROCHLORIDE 150 MG/1
TABLET, EXTENDED RELEASE ORAL
COMMUNITY
Start: 2024-01-01

## 2024-02-13 RX ORDER — CALCIUM CARBONATE 500(1250)
TABLET ORAL
COMMUNITY

## 2024-02-13 RX ORDER — DESOXIMETASONE 2.5 MG/G
1 OINTMENT TOPICAL
COMMUNITY
Start: 2021-03-08

## 2024-02-13 RX ORDER — LOSARTAN POTASSIUM 100 MG/1
TABLET ORAL
COMMUNITY
Start: 2013-11-11

## 2024-02-13 RX ORDER — FLUOXETINE HYDROCHLORIDE 40 MG/1
CAPSULE ORAL
COMMUNITY
Start: 2018-05-08

## 2024-02-13 RX ORDER — TRIAMCINOLONE ACETONIDE 1 MG/G
CREAM TOPICAL
COMMUNITY
Start: 2022-06-08

## 2024-02-13 RX ORDER — KETOCONAZOLE 20 MG/ML
SHAMPOO, SUSPENSION TOPICAL
COMMUNITY
Start: 2022-05-17

## 2024-02-13 RX ORDER — DEXAMETHASONE 6 MG/1
TABLET ORAL
COMMUNITY
Start: 2022-11-29

## 2024-02-13 RX ORDER — ASPIRIN 325 MG
50000 TABLET, DELAYED RELEASE (ENTERIC COATED) ORAL
COMMUNITY

## 2024-02-13 RX ORDER — DESONIDE 0.5 MG/G
CREAM TOPICAL
COMMUNITY
Start: 2021-07-27

## 2024-02-13 RX ORDER — CLOTRIMAZOLE 1 %
CREAM (GRAM) TOPICAL 2 TIMES DAILY
COMMUNITY
Start: 2023-06-16

## 2024-02-13 RX ORDER — MULTIVITAMIN/IRON/FOLIC ACID 18MG-0.4MG
TABLET ORAL
COMMUNITY
Start: 2020-10-19

## 2024-02-13 RX ORDER — MOXIFLOXACIN 5 MG/ML
1 SOLUTION/ DROPS OPHTHALMIC 4 TIMES DAILY
COMMUNITY
Start: 2023-10-16

## 2024-02-13 RX ORDER — ERGOCALCIFEROL 1.25 MG/1
CAPSULE ORAL
COMMUNITY

## 2024-02-13 RX ORDER — TRIAMTERENE AND HYDROCHLOROTHIAZIDE 75; 50 MG/1; MG/1
TABLET ORAL
COMMUNITY
Start: 2021-02-03

## 2024-02-13 RX ORDER — NYSTATIN 100000 [USP'U]/G
POWDER TOPICAL
COMMUNITY
Start: 2022-11-29

## 2024-02-13 RX ORDER — CLOMIPRAMINE HYDROCHLORIDE 25 MG/1
CAPSULE ORAL
COMMUNITY
Start: 2021-07-07

## 2024-02-13 RX ORDER — DICLOFENAC EPOLAMINE 0.01 G/1
SYSTEM TOPICAL EVERY 12 HOURS
COMMUNITY
Start: 2015-12-04

## 2024-02-13 RX ORDER — QUETIAPINE FUMARATE 25 MG/1
TABLET, FILM COATED ORAL
COMMUNITY
Start: 2021-07-28

## 2024-02-13 RX ORDER — GABAPENTIN 400 MG/1
CAPSULE ORAL
COMMUNITY
Start: 2016-11-16 | End: 2024-02-15 | Stop reason: ALTCHOICE

## 2024-02-13 RX ORDER — SODIUM CHLORIDE 0.9 % (FLUSH) 0.9 %
10 SYRINGE (ML) INJECTION
COMMUNITY
Start: 2023-05-05 | End: 2024-08-03

## 2024-02-13 RX ORDER — TIZANIDINE 2 MG/1
2-4 TABLET ORAL NIGHTLY
COMMUNITY
End: 2024-02-15 | Stop reason: SDUPTHER

## 2024-02-13 RX ORDER — MUPIROCIN 20 MG/G
OINTMENT TOPICAL
COMMUNITY
Start: 2021-11-03

## 2024-02-13 RX ORDER — PREDNISOLONE ACETATE 10 MG/ML
1 SUSPENSION/ DROPS OPHTHALMIC
COMMUNITY
Start: 2023-10-26

## 2024-02-13 RX ORDER — FLUTICASONE PROPIONATE AND SALMETEROL 100; 50 UG/1; UG/1
POWDER RESPIRATORY (INHALATION) EVERY 12 HOURS
COMMUNITY
Start: 2022-10-26

## 2024-02-13 RX ORDER — BUPROPION HYDROCHLORIDE 150 MG/1
TABLET ORAL
COMMUNITY
Start: 2022-05-25

## 2024-02-13 RX ORDER — DICYCLOMINE HYDROCHLORIDE 10 MG/1
10 CAPSULE ORAL EVERY 8 HOURS PRN
COMMUNITY
Start: 2023-05-30

## 2024-02-13 RX ORDER — SENNOSIDES 8.6 MG/1
TABLET ORAL
COMMUNITY
Start: 2021-09-14

## 2024-02-13 RX ORDER — COVID-19 MOLECULAR TEST ASSAY
KIT MISCELLANEOUS
COMMUNITY
Start: 2023-12-29

## 2024-02-13 RX ORDER — ACYCLOVIR 50 MG/G
OINTMENT TOPICAL
COMMUNITY
Start: 2021-05-27

## 2024-02-15 ENCOUNTER — OFFICE VISIT (OUTPATIENT)
Dept: PAIN MEDICINE | Facility: CLINIC | Age: 69
End: 2024-02-15
Payer: OTHER GOVERNMENT

## 2024-02-15 ENCOUNTER — APPOINTMENT (OUTPATIENT)
Dept: RADIOLOGY | Facility: CLINIC | Age: 69
End: 2024-02-15
Payer: OTHER GOVERNMENT

## 2024-02-15 ENCOUNTER — APPOINTMENT (OUTPATIENT)
Dept: SURGICAL ONCOLOGY | Facility: CLINIC | Age: 69
End: 2024-02-15
Payer: OTHER GOVERNMENT

## 2024-02-15 DIAGNOSIS — S80.02XA CONTUSION OF LEFT KNEE, INITIAL ENCOUNTER: ICD-10-CM

## 2024-02-15 DIAGNOSIS — S51.011A LACERATION OF RIGHT ELBOW, INITIAL ENCOUNTER: ICD-10-CM

## 2024-02-15 DIAGNOSIS — S43.402A SPRAIN OF LEFT SHOULDER, UNSPECIFIED SHOULDER SPRAIN TYPE, INITIAL ENCOUNTER: ICD-10-CM

## 2024-02-15 DIAGNOSIS — S33.5XXA LUMBAR SPRAIN, INITIAL ENCOUNTER: Primary | ICD-10-CM

## 2024-02-15 PROCEDURE — 99214 OFFICE O/P EST MOD 30 MIN: CPT | Performed by: PHYSICAL MEDICINE & REHABILITATION

## 2024-02-15 RX ORDER — TRAZODONE HYDROCHLORIDE 50 MG/1
50 TABLET ORAL NIGHTLY
Qty: 30 TABLET | Refills: 2 | Status: SHIPPED | OUTPATIENT
Start: 2024-02-15 | End: 2024-03-19 | Stop reason: SDUPTHER

## 2024-02-15 RX ORDER — OXYCODONE AND ACETAMINOPHEN 7.5; 325 MG/1; MG/1
1 TABLET ORAL EVERY 8 HOURS PRN
Qty: 84 TABLET | Refills: 0 | Status: SHIPPED | OUTPATIENT
Start: 2024-02-15 | End: 2024-03-19 | Stop reason: SDUPTHER

## 2024-02-15 RX ORDER — OXYCODONE AND ACETAMINOPHEN 7.5; 325 MG/1; MG/1
1 TABLET ORAL EVERY 8 HOURS PRN
Qty: 84 TABLET | Refills: 0 | Status: SHIPPED | OUTPATIENT
Start: 2024-03-14 | End: 2024-03-19 | Stop reason: SDUPTHER

## 2024-02-15 RX ORDER — TIZANIDINE 2 MG/1
2-4 TABLET ORAL NIGHTLY
Qty: 30 TABLET | Refills: 1 | Status: SHIPPED | OUTPATIENT
Start: 2024-02-15 | End: 2024-03-19 | Stop reason: SDUPTHER

## 2024-02-15 NOTE — PROGRESS NOTES
DOL claim [id number]  DOI 08/30/2019       · Contusion of knee, left  (S80.02XA)   · Elbow laceration, right, initial encounter  (S51.011A)   · Sprain ligaments of lumbar spine S33.5XXA   . Unsp Sprain left shoulder joint, initial encounter S43.402A    Chief complaint  Left knee and shoulder pain right elbow and low back pain    History  She is here today related to the claim above affecting the knee elbow left shoulder.  Feels that she has a prior claim affecting the back and it is still in dispute.  She has seen a free examiner with Dr. Harris however it seems that there was some deficiencies in his report and the Department of Labor had sent it back.  Will wait for the final response from the Department of Labor and address that as necessary.    In the meanwhile we are still tending to her right elbow left shoulder and left knee as well as her lower back which was aggravated by this injury    The pain in the left shoulder is associated with impingement this is mechanical deep stabbing worse with movement.  The pain in the left knee is worse with standing and walking.  Her right elbow has been better.  The spine continues to affect her and this is radiating to the right lower limb.  She had this injury to the spine from 2001 but that was aggravated in the fall from 2019         Pain level without medication is 8/10 , with the medication pain level 4 to 5/10.     The pain meds are helping control the pain and improving Activities of Daily living and quality of life and quality of sleep.    opioids treatment agreement February 2024  PDI (Pain Disability Index) score: 53  Oarrs pulled and scanned in the chart  no concerns  last urine toxicology testing earlier this year and it was compliant we will repeat  Xray updated spine    the knees and elbows  ORT Score is 0  Pain pathology and pain generators spine and joint  Modalities tried injection, surgery, physical therapy, TENS unit, nonsteroidal anti-inflammatory  medication       Denied any fever or chills. No weight loss and no night sweats. No cough or sputum production. No diarrhea   The constipation has been responding to fibers and over the counter medications.     No bladder and bowel incontinence and no other changes in bladder and bowel. No skin changes.  Reports tiredness and fatigability only if the pain is not controlled.   Denied opioids diversion and abuse and denies alcoholism. Denies overuse of the pain medications.    The control of the pain with the pain medications is helping the control of the symptoms and allowing the function and activities of daily living, enjoyment of life, improving the quality of life and sleep with less interruption by the pain. The goal is symptomatic control of the nonmalignant chronic pain and not to repair the permanent damage in the tissues inducing the chronic pain conditions. We are aiming to shift the focus from the nonmalignant chronic pain to other aspects of life by symptomatically treating this chronic pain. If this pain is not treated it will lead to major morbidity and it is also associated with increased risks of mortality. The patient understands those very clearly and also understand high risks of morbidity and mortality if not strictly adherent to the treatment recommendations and reporting any associated side effects. Also patient understand the full responsibility associated with these medications to avoid abuse or overuse or any use of these medications for anything besides treating the patient's own chronic pain and nothing else under any circumstances.        Physical examination  Awake, alert and oriented for time place and persons   declined Chaperone for the visit and was adequately  draped for the exam.      Examination of the left knee showed mild clinical effusion. Normal skin color and texture palpation of the knee showed tenderness over the medial and lateral joint line and the sensation of crepitation  upon range of motion.  Range of motion from -2 degrees to 105 degrees. No medial lateral instability. No drawer sign.  Lachman is negative.  Positive Robert both medially and laterally.  Negative pivot shift.  Homans' sign is negative.  Calves are soft.  Knee flexion and extension is 4/5 and limited by the pain.   The left shoulder physical examination showed mild atrophy of the left supraspinatus compared to the right side.  Impingement at 90 degrees in forward flexion and abduction.  This improved by few degrees upon external rotation.  Tight muscle bands and trigger points around the shoulder blade muscles.  No overt shoulder instability but tenderness on palpation of the subacromial area.  Negative cervical root tension sign.  Left shoulder strength is 4/5 in all directions.  No distal sensorimotor deficits associated with the left shoulder.     Diagnosis  Problem List Items Addressed This Visit       Contusion of left knee    Relevant Medications    oxyCODONE-acetaminophen (Percocet) 7.5-325 mg tablet    oxyCODONE-acetaminophen (Percocet) 7.5-325 mg tablet (Start on 3/14/2024)    traZODone (Desyrel) 50 mg tablet    tiZANidine (Zanaflex) 2 mg tablet    Laceration of right elbow    Relevant Medications    oxyCODONE-acetaminophen (Percocet) 7.5-325 mg tablet    oxyCODONE-acetaminophen (Percocet) 7.5-325 mg tablet (Start on 3/14/2024)    traZODone (Desyrel) 50 mg tablet    tiZANidine (Zanaflex) 2 mg tablet    Lumbar sprain - Primary    Relevant Medications    oxyCODONE-acetaminophen (Percocet) 7.5-325 mg tablet    oxyCODONE-acetaminophen (Percocet) 7.5-325 mg tablet (Start on 3/14/2024)    traZODone (Desyrel) 50 mg tablet    tiZANidine (Zanaflex) 2 mg tablet    Sprain of shoulder, left    Relevant Medications    oxyCODONE-acetaminophen (Percocet) 7.5-325 mg tablet    oxyCODONE-acetaminophen (Percocet) 7.5-325 mg tablet (Start on 3/14/2024)    traZODone (Desyrel) 50 mg tablet    tiZANidine (Zanaflex) 2 mg tablet         Plan  Reviewed the pain generators.  Went over the types of pain with neuropathic and nociceptive and different pathologies and therapeutic modalities. Discussed the mechanism of action of interventions from acupuncture, physical therapy , regular exercises, injections, botox, spinal cord stimulation, and role of surgery     Went over pathology of the intervertebral disc displacement and the anatomical relation to the Nerve roots and relation to the radicular symptoms. Went over treatment modalities with conservative treatment including acupuncture   and epidural steroid injection with fluoroscopy guidance and last resort of surgery    Based on the above findings and the clinical response to the opioids medications and improvement of the activities of daily living, sleep, and work performance. We made this complex decision to continue the opioids therapy in light of the evidence of the patient's responsibility in using the pain medications as prescribed for the nonmalignant chronic pain condition. We discussed about the use of the pain medications to treat the symptoms of chronic nonmalignant pain and we are not trying the repair the permanent damage in the tissues, rather we are trying to control the symptoms induced by the permanent damage to the tissues inducing the chronic pain condition and resulting disability. I explained the difference and discussed it with the patient and stressed the importance of knowing the difference especially because of the potential side effects and the potential addicting effect and habit forming nature of the dangerous drugs we are using to treat the symptoms of the chronic pain.      We discussed that we are prescribing the medications on good quinn and legitimate medical reason.     We reviewed the side effects and precautions of opioids prescriptions as discussed in the opioids treatment agreement.    realizes the interaction between the therapeutic classes including the  respiratory depression and potential death     Random drug testing twice in 6 months we will submit     Oxycodoe TID  Stop leeann no effect  Continue with tizanidine and trazodone   has a narcan at home know how and when to use it if needed.     Discussed about NSAIDS and I explained about the opioids sparing effect to allow keeping the opioids dose at minimal effective dose.   I went over the potential side effects of the NSAIDS on the gastrointestinal, renal and cardiovascular systems.      I detailed the side effects from the acetaminophen in the medication and made aware of those. I also explained about the cumulative effects on the organs and mainly the liver.     Given the opioids therapy , we discussed about the risk for accidental over dose on the pain medications, either for patient or other household. I went over the mechanism of action and mode of use of the Naloxone according to the  recommendations. I will provide a prescription for a kit.     Follow-up 8 weeks or earlier if needed     The level of clinical decision making in this office visit,  is high, given the high risks of complications with the morbidity and mortality due to the fact that acute and chronic pain may pose a threat to life and bodily function, if under treated, poorly treated, or with failure to maintain adequate treatment and timely medical follow up. Additionally over treatment has its own set of complications including overdosing on the pain medications and also the habit forming potentials with the use of the medications used to treat chronic painful conditions including therapeutic classes classified as dangerous medications. Given the serious and fluctuating nature of pain level and instensity with extensive consideration for whenever pain changes, there is always the risk of prolonged functional impairment requiring close patient monitoring with regular assessments and reassessments and high level medical decision  making at every office visit. The amount and complexity of data reviewed is high given the patient clinical presentation, labs,  data, radiology reports, and other tests as discussed during office visits. Pertinent data whether positive or negative were taken in consideration in the process of making this high level medical decision.

## 2024-02-16 ENCOUNTER — HOSPITAL ENCOUNTER (OUTPATIENT)
Dept: RADIOLOGY | Facility: EXTERNAL LOCATION | Age: 69
Discharge: HOME | End: 2024-02-16

## 2024-02-16 RX ORDER — CETIRIZINE HYDROCHLORIDE 10 MG/1
10 TABLET ORAL 2 TIMES DAILY
COMMUNITY
Start: 2024-02-15

## 2024-02-16 RX ORDER — HYDROCORTISONE 25 MG/G
OINTMENT TOPICAL
COMMUNITY
Start: 2024-01-24

## 2024-02-16 NOTE — PROGRESS NOTES
Sonam Saab female   1955 68 y.o.   [id number]      Chief Complaint  Left breast mass    History Of Present Illness  Sonam Saab is a 68 y.o. AA female [facility] breast [facility]. She first noticed the lump. She denies trauma to the breast. She has no family history of breast cancer. She denies breast surgery or biopsy.    BREAST IMAGIN2023 Bilateral screening mammogram, indicates BI-RADS Category 0. Right breast asymmetry warranting additional views. 2023 Right diagnostic mammogram, indicates BI-RADS Category 2.     REPRODUCTIVE HISTORY: menarche age, GP, first birth age, , OCP's, menopause age, no HRT, scattered fibroglandular tissue    FAMILY CANCER HISTORY:      Surgical History  She has a past surgical history that includes Total abdominal hysterectomy w/ bilateral salpingoophorectomy (2013); Cholecystectomy (2013); Gastric restriction surgery (2013); Other surgical history (2019); Adenoidectomy (11/10/2015); Sinus surgery (11/10/2015); Tonsillectomy (11/10/2015); Esophagogastroduodenoscopy (2014); and Colonoscopy (10/22/2013).     Social History  She reports that she has quit smoking. Her smoking use included cigarettes. She does not have any smokeless tobacco history on file. No history on file for alcohol use and drug use.    Family History  Family History   Problem Relation Name Age of Onset    Heart failure Mother      COPD Mother      COPD Sister      Mitral valve prolapse Sister      Breast cancer Maternal Grandmother      Colon cancer Maternal Grandmother      Diabetes Other      Arthritis Other      Colon cancer Other      Hypertension Other      Hyperlipidemia Other          Allergies  Rubber, unspecified; Ace inhibitors; Codeine; Latex; Ragweed; and Adhesive tape-silicones    Medications  Current Outpatient Medications   Medication Instructions    acyclovir (Zovirax) 5 % ointment Acyclovir 5 % External Ointment  Quantity: 15 Refills: 0 Ordered: 27-May-2021 DO Start : 27-May-2021 Active    albuterol (ProAir HFA) 90 mcg/actuation inhaler 1-2 puffs, inhalation, Every 4 hours PRN    albuterol 2.5 mg, inhalation, Every 4 hours PRN    amLODIPine (NORVASC) 5 mg, oral, Daily    ammonium lactate (Amlactin) 12 % cream 1 Application, Topical, 2 times daily    Anti-DiarrheaL, loperamide, 2 mg tablet TAKE 1 TABLET BY MOUTH TWICE DAILY AS NEEDED (DIARRHEA).    aspirin 81 mg, oral, Daily    b complex 0.4 mg tablet oral, Daily RT    baclofen (LIORESAL) 10 mg, oral, 2 times daily    betamethasone dipropionate 0.05 % cream Betamethasone Dipropionate 0.05 % External Cream Quantity: 45 Refills: 0 Ordered: 27-Jul-2021 DO Start : 27-Jul-2021 Complete    BinaxNOW COVID-19 Ag Self Test kit TEST AS DIRECTED TODAY    budesonide-formoteroL (Symbicort) 80-4.5 mcg/actuation inhaler inhalation, Every 12 hours    buPROPion SR (Wellbutrin SR) 150 mg 12 hr tablet     buPROPion XL (Wellbutrin XL) 150 mg 24 hr tablet oral    buPROPion XL (WELLBUTRIN XL) 150 mg, oral, Daily RT    cholecalciferol (VITAMIN D-3) 50,000 Units, oral, Weekly    clobetasol (Temovate) 0.05 % ointment 1 Application, Topical, 2 times daily PRN    clomiPRAMINE (Anafranil) 25 mg capsule oral    clotrimazole (Lotrimin) 1 % cream Topical, 2 times daily    clotrimazole-betamethasone (Lotrisone) cream 1 Application, Topical, 2 times daily    desonide (DesOwen) 0.05 % cream APPLY TO AFFECTED AREAS OF NECK TWICE DAILY, FIVE DAYS A WEEK FOR MAINTENANCE.    desoximetasone (Topicort) 0.25 % ointment 1 Application, Topical    dexAMETHasone (Decadron) 6 mg tablet oral    dextromethorphan-guaifenesin  mg/5 mL liquid 5 mL, oral    diclofenac epolamine 1.3 % patch transdermal, Every 12 hours    dicyclomine (Bentyl) 10 mg capsule oral    dicyclomine (BENTYL) 10 mg, oral, Every 8 hours PRN    econazole nitrate 1 % cream Every 12 hours    ergocalciferol (Vitamin D-2) 1.25 MG (71029 UT) capsule oral     FLUoxetine (PROzac) 40 mg capsule oral    fluticasone propion-salmeteroL (Advair Diskus) 100-50 mcg/dose diskus inhaler inhalation, Every 12 hours    furosemide (LASIX) 20 mg, oral, Daily    hydroxychloroquine (PLAQUENIL) 200 mg, oral, 2 times daily    hydrOXYzine HCL (Atarax) 25 mg tablet TAKE 1 TABLET BY MOUTH TWICE DAILY AS NEEDED FOR ITCHING. DO NOT DRIVE ON THIS MEDICATION    ketoconazole (NIZOral) 2 % shampoo APPLY TO SCALP ONCE PER WEEK. LEAVE ON FOR 10-15 MINUTES BEFORE WASHING OUT. -NOT COVERED-    loratadine (CLARITIN) 10 mg, oral, Daily    losartan (Cozaar) 100 mg tablet     losartan-hydrochlorothiazide (Hyzaar) 50-12.5 mg tablet 1 tablet, oral, Daily RT    lubiprostone (AMITIZA) 24 mcg, oral, 2 times daily with meals    metoprolol succinate XL (TOPROL-XL) 200 mg, oral, Daily    mirabegron (Myrbetriq) 50 mg tablet extended release 24 hr 24 hr tablet oral    moxifloxacin (Vigamox) 0.5 % ophthalmic solution 1 drop, ophthalmic (eye), 4 times daily    mupirocin (Bactroban) 2 % ointment Mupirocin 2 % External Ointment Quantity: 22 Refills: 0 Ordered: 3-Nov-2021 DO Start : 3-Nov-2021 Complete    naloxone (NARCAN) 4 mg, nasal, As needed, May repeat every 2-3 minutes if needed, alternating nostrils, until medical assistance becomes available.    nitrofurantoin, macrocrystal-monohydrate, (Macrobid) 100 mg capsule 1 capsule, oral, Every 12 hours    nystatin (Mycostatin) 100,000 unit/gram powder Nystop 322565 UNIT/GM External Powder APPLY 1 GRAM TOPICALLY TO SKIN FOLD TWICE DAILY Quantity: 15 Refills: 0 Ordered: 30-Nov-2022 DO Start : 29-Nov-2022 Complete    oxybutynin (Ditropan) 5 mg tablet     oxyCODONE-acetaminophen (Percocet) 7.5-325 mg tablet 1 tablet, oral, Every 8 hours PRN    [START ON 3/14/2024] oxyCODONE-acetaminophen (Percocet) 7.5-325 mg tablet 1 tablet, oral, Every 8 hours PRN    pravastatin (PRAVACHOL) 20 mg, oral, Daily    prednisoLONE acetate (Pred-Forte) 1 % ophthalmic suspension 1 drop,  ophthalmic (eye)    QUEtiapine (SEROquel) 25 mg tablet oral    RABEprazole (ACIPHEX) 20 mg, oral, Daily    semaglutide (weight loss) (WEGOVY) 0.25 mg, subcutaneous, Weekly    semaglutide (weight loss) 0.5 mg, subcutaneous, Weekly    sennosides (Senokot) 8.6 mg tablet oral    sodium chloride 0.9% (sodium chloride) flush 10 mL, intravenous    tiZANidine (ZANAFLEX) 2-4 mg, oral, Nightly    traZODone (DESYREL) 50 mg, oral, Nightly    triamcinolone (Kenalog) 0.1 % cream APPLY TO AFFECTED AREAS 3-4 TIMES DAILY. DO NOT USE ON FACE.    triamterene-hydrochlorothiazid (Maxzide) 75-50 mg tablet oral    Wixela Inhub 100-50 mcg/dose diskus inhaler 1 puff, inhalation, Every 12 hours         REVIEW OF SYSTEMS    Constitutional:  Negative for appetite change, fatigue, fever and unexpected weight change.   HENT:  Negative for ear pain, hearing loss, nosebleeds, sore throat and trouble swallowing.    Eyes:  Negative for discharge, itching and visual disturbance.   Respiratory:  Negative for cough, chest tightness and shortness of breath.    Cardiovascular:  Negative for chest pain, palpitations and leg swelling.   Breast: as indicated in HPI  Gastrointestinal:  Negative for abdominal pain, constipation, diarrhea and nausea.   Endocrine: Negative for cold intolerance and heat intolerance.   Genitourinary:  Negative for dysuria, frequency, hematuria, pelvic pain and vaginal bleeding.   Musculoskeletal:  Negative for arthralgias, back pain, gait problem, joint swelling and myalgias.   Skin:  Negative for color change and rash.   Allergic/Immunologic: Negative for environmental allergies and food allergies.   Neurological:  Negative for dizziness, tremors, speech difficulty, weakness, numbness and headaches.   Hematological:  Does not bruise/bleed easily.   Psychiatric/Behavioral:  Negative for agitation, dysphoric mood and sleep disturbance. The patient is not nervous/anxious.         Past Medical History  She has a past medical history  of Depression, unspecified (04/02/2019), Essential (primary) hypertension (11/05/2013), Pain in unspecified knee, Pain in unspecified limb, Personal history of other diseases of the digestive system, Personal history of other diseases of the digestive system, Personal history of other diseases of the musculoskeletal system and connective tissue, Personal history of other diseases of the musculoskeletal system and connective tissue, Personal history of other diseases of the nervous system and sense organs, Personal history of other diseases of the nervous system and sense organs, Personal history of other diseases of the nervous system and sense organs, Personal history of other specified conditions (07/16/2018), Personal history of other specified conditions, Personal history of other specified conditions, Sprain of ligaments of lumbar spine, initial encounter (04/18/2013), and Unspecified asthma, uncomplicated (06/22/2022).     Physical Exam  Patient is alert and oriented x3 and in a relaxed and appropriate mood. Her gait is steady and hand grasps are equal. Sclera is clear. The breasts are nearly symmetrical. The tissue is soft without palpable abnormalities, discrete nodules or masses. The skin and nipples appear normal. There is no cervical, supraclavicular or axillary lymphadenopathy. Heart rate and rhythm normal, S1 and S2 appreciated. The lungs are clear to auscultation bilaterally. Abdomen is soft and non-tender.       Physical Exam     Last Recorded Vitals  There were no vitals filed for this visit.    Relevant Results   Time was spent viewing digital images of the radiology testing with the patient. I explained the results in depth, along with suggested explanation for follow up recommendations based on the testing results. BI-RADS Category     Imaging        Assessment/Plan   Clinical exam and imaging, left breast, no family history of breast cancer, scattered fibroglandular tissue    Plan:     Patient  Discussion/Summary  Your clinical examination and imaging are normal. Please return in one year for bilateral screening mammogram and office visit or sooner if you have any problems or concerns.     You can see your health information, review clinical summaries from office visits & test results online when you follow your health with MY  Chart, a personal health record. To sign up go to [email or web address]. If you need assistance with signing up or trouble getting into your account call [facility] Patient Line 24/7 at [phone].    My office phone number is [phone] if you need to get in touch with me or have additional questions or concerns. Thank you for choosing [facility] and trusting me as your healthcare provider. I look forward to seeing you again at your next office visit. I am honored to be a provider on your health care team and I remain dedicated to helping you achieve your health goals.      Olinda To, APRN-CNP

## 2024-02-19 ENCOUNTER — APPOINTMENT (OUTPATIENT)
Dept: SURGICAL ONCOLOGY | Facility: CLINIC | Age: 69
End: 2024-02-19
Payer: OTHER GOVERNMENT

## 2024-02-27 ENCOUNTER — APPOINTMENT (OUTPATIENT)
Dept: PAIN MEDICINE | Facility: CLINIC | Age: 69
End: 2024-02-27
Payer: MEDICARE

## 2024-03-05 ENCOUNTER — APPOINTMENT (OUTPATIENT)
Dept: RADIOLOGY | Facility: CLINIC | Age: 69
End: 2024-03-05
Payer: MEDICARE

## 2024-03-05 ENCOUNTER — CLINICAL SUPPORT (OUTPATIENT)
Dept: AUDIOLOGY | Facility: CLINIC | Age: 69
End: 2024-03-05
Payer: COMMERCIAL

## 2024-03-05 DIAGNOSIS — H90.3 SENSORINEURAL HEARING LOSS (SNHL) OF BOTH EARS: Primary | ICD-10-CM

## 2024-03-05 DIAGNOSIS — Z96.21 COCHLEAR IMPLANT IN PLACE: ICD-10-CM

## 2024-03-05 PROCEDURE — V5299 HEARING SERVICE: HCPCS | Performed by: AUDIOLOGIST

## 2024-03-05 NOTE — PROGRESS NOTES
CHIEF COMPLAINT  Cochlear implant equipment troubleshooting  Last seen for program optimization 5/27/2021  (Erroneously scheduled as a hearing aid check appointment on a non-[facility] provider schedule, was moved over to this clinician and worked in upon arrival)    History of Present Illness  Sonam Saab, age 68 years, is being seen for the equipment troubleshooting of the bilateral / cochlear implants used in conjunction with the [id number] sound processors. Her left side was implanted on 12/19/2019, her right side was implanted on 11/30/2018.     Ms. Saab reports that the left magnet will not stay on consistently.   She also reports the right processor is broken.    PROCEDURE   Visual inspection of the equipment revealed a rechargeable battery broke off into the right processor. This was able to be removed and following this, the processor began functioning normally. The left magnet was increased from a 3m to a 4m with improved retention noted.     TREATMENT PLAN  Continue use of bilateral N7 processors  Initiate upgrade to N8 on the right side, as the current N7 is over five years old  Once upgrade is received, return to clinic for program optimization    [id number]    Isaiah Ochoa, CCC-A

## 2024-03-06 ENCOUNTER — APPOINTMENT (OUTPATIENT)
Dept: HEMATOLOGY/ONCOLOGY | Facility: HOSPITAL | Age: 69
End: 2024-03-06
Payer: MEDICARE

## 2024-03-06 ENCOUNTER — TELEPHONE (OUTPATIENT)
Dept: HEMATOLOGY/ONCOLOGY | Facility: HOSPITAL | Age: 69
End: 2024-03-06
Payer: MEDICARE

## 2024-03-06 DIAGNOSIS — D47.2 MGUS (MONOCLONAL GAMMOPATHY OF UNKNOWN SIGNIFICANCE): Primary | ICD-10-CM

## 2024-03-06 NOTE — TELEPHONE ENCOUNTER
The patient did call back, was agreeable to getting labs at a  lab prior to new visit on Tuesday 3/12. States she doesn't get out of bed until 12 so a 1030 visit will not work, visit moved to another opening at 2pm that day, she was appreciative.

## 2024-03-06 NOTE — TELEPHONE ENCOUNTER
Attempted to call Sonam regarding her appointment today with Acosta Loredo NP. She did not have her labs drawn.  Left a message asking her to get labs this week and we can reschedule her phone appointment to next Tuesday.  Left call back number for any other questions or concerns.

## 2024-03-07 ENCOUNTER — APPOINTMENT (OUTPATIENT)
Dept: PAIN MEDICINE | Facility: CLINIC | Age: 69
End: 2024-03-07
Payer: MEDICARE

## 2024-03-11 ENCOUNTER — HOSPITAL ENCOUNTER (OUTPATIENT)
Dept: RADIOLOGY | Facility: CLINIC | Age: 69
Discharge: HOME | End: 2024-03-11
Payer: MEDICARE

## 2024-03-11 ENCOUNTER — LAB (OUTPATIENT)
Dept: LAB | Facility: LAB | Age: 69
End: 2024-03-11
Payer: MEDICARE

## 2024-03-11 VITALS — WEIGHT: 293 LBS | HEIGHT: 68 IN | BODY MASS INDEX: 44.41 KG/M2

## 2024-03-11 DIAGNOSIS — N63.20 MASS OF LEFT BREAST, UNSPECIFIED QUADRANT: ICD-10-CM

## 2024-03-11 DIAGNOSIS — D47.2 MGUS (MONOCLONAL GAMMOPATHY OF UNKNOWN SIGNIFICANCE): ICD-10-CM

## 2024-03-11 DIAGNOSIS — N64.9 DISORDER OF BREAST, UNSPECIFIED: ICD-10-CM

## 2024-03-11 LAB
ALBUMIN SERPL BCP-MCNC: 3.7 G/DL (ref 3.4–5)
ALP SERPL-CCNC: 94 U/L (ref 33–136)
ALT SERPL W P-5'-P-CCNC: 7 U/L (ref 7–45)
ANION GAP SERPL CALC-SCNC: 12 MMOL/L (ref 10–20)
AST SERPL W P-5'-P-CCNC: 10 U/L (ref 9–39)
BASOPHILS # BLD AUTO: 0.01 X10*3/UL (ref 0–0.1)
BASOPHILS NFR BLD AUTO: 0.2 %
BILIRUB SERPL-MCNC: 0.6 MG/DL (ref 0–1.2)
BUN SERPL-MCNC: 15 MG/DL (ref 6–23)
CALCIUM SERPL-MCNC: 9.3 MG/DL (ref 8.6–10.6)
CHLORIDE SERPL-SCNC: 101 MMOL/L (ref 98–107)
CO2 SERPL-SCNC: 32 MMOL/L (ref 21–32)
CREAT SERPL-MCNC: 0.9 MG/DL (ref 0.5–1.05)
EGFRCR SERPLBLD CKD-EPI 2021: 71 ML/MIN/1.73M*2
EOSINOPHIL # BLD AUTO: 0.14 X10*3/UL (ref 0–0.7)
EOSINOPHIL NFR BLD AUTO: 2.8 %
ERYTHROCYTE [DISTWIDTH] IN BLOOD BY AUTOMATED COUNT: 12.4 % (ref 11.5–14.5)
GLUCOSE SERPL-MCNC: 75 MG/DL (ref 74–99)
HCT VFR BLD AUTO: 40.7 % (ref 36–46)
HGB BLD-MCNC: 12.8 G/DL (ref 12–16)
IMM GRANULOCYTES # BLD AUTO: 0.01 X10*3/UL (ref 0–0.7)
IMM GRANULOCYTES NFR BLD AUTO: 0.2 % (ref 0–0.9)
LYMPHOCYTES # BLD AUTO: 1.89 X10*3/UL (ref 1.2–4.8)
LYMPHOCYTES NFR BLD AUTO: 38 %
MCH RBC QN AUTO: 28.9 PG (ref 26–34)
MCHC RBC AUTO-ENTMCNC: 31.4 G/DL (ref 32–36)
MCV RBC AUTO: 92 FL (ref 80–100)
MONOCYTES # BLD AUTO: 0.44 X10*3/UL (ref 0.1–1)
MONOCYTES NFR BLD AUTO: 8.8 %
NEUTROPHILS # BLD AUTO: 2.49 X10*3/UL (ref 1.2–7.7)
NEUTROPHILS NFR BLD AUTO: 50 %
NRBC BLD-RTO: 0 /100 WBCS (ref 0–0)
PLATELET # BLD AUTO: 239 X10*3/UL (ref 150–450)
POTASSIUM SERPL-SCNC: 3.6 MMOL/L (ref 3.5–5.3)
PROT SERPL-MCNC: 6.4 G/DL (ref 6.4–8.2)
PROT SERPL-MCNC: 6.4 G/DL (ref 6.4–8.2)
RBC # BLD AUTO: 4.43 X10*6/UL (ref 4–5.2)
SODIUM SERPL-SCNC: 141 MMOL/L (ref 136–145)
WBC # BLD AUTO: 5 X10*3/UL (ref 4.4–11.3)

## 2024-03-11 PROCEDURE — 83521 IG LIGHT CHAINS FREE EACH: CPT

## 2024-03-11 PROCEDURE — 86334 IMMUNOFIX E-PHORESIS SERUM: CPT

## 2024-03-11 PROCEDURE — 82784 ASSAY IGA/IGD/IGG/IGM EACH: CPT

## 2024-03-11 PROCEDURE — 85025 COMPLETE CBC W/AUTO DIFF WBC: CPT

## 2024-03-11 PROCEDURE — 80053 COMPREHEN METABOLIC PANEL: CPT

## 2024-03-11 PROCEDURE — 84155 ASSAY OF PROTEIN SERUM: CPT

## 2024-03-11 PROCEDURE — 86320 SERUM IMMUNOELECTROPHORESIS: CPT

## 2024-03-11 PROCEDURE — 84165 PROTEIN E-PHORESIS SERUM: CPT

## 2024-03-11 PROCEDURE — 36415 COLL VENOUS BLD VENIPUNCTURE: CPT

## 2024-03-12 ENCOUNTER — TELEMEDICINE (OUTPATIENT)
Dept: HEMATOLOGY/ONCOLOGY | Facility: HOSPITAL | Age: 69
End: 2024-03-12
Payer: MEDICARE

## 2024-03-12 DIAGNOSIS — D47.2 MGUS (MONOCLONAL GAMMOPATHY OF UNKNOWN SIGNIFICANCE): Primary | ICD-10-CM

## 2024-03-12 LAB
IGA SERPL-MCNC: 338 MG/DL (ref 70–400)
IGG SERPL-MCNC: 1040 MG/DL (ref 700–1600)
IGM SERPL-MCNC: 65 MG/DL (ref 40–230)
KAPPA LC SERPL-MCNC: 3.74 MG/DL (ref 0.33–1.94)
KAPPA LC/LAMBDA SER: 0.1 {RATIO} (ref 0.26–1.65)
LAMBDA LC SERPL-MCNC: 39.27 MG/DL (ref 0.57–2.63)

## 2024-03-12 PROCEDURE — 3060F POS MICROALBUMINURIA REV: CPT

## 2024-03-12 PROCEDURE — 4010F ACE/ARB THERAPY RXD/TAKEN: CPT

## 2024-03-12 PROCEDURE — 99443 PR PHYS/QHP TELEPHONE EVALUATION 21-30 MIN: CPT

## 2024-03-12 PROCEDURE — 1125F AMNT PAIN NOTED PAIN PRSNT: CPT

## 2024-03-12 ASSESSMENT — ENCOUNTER SYMPTOMS
ARTHRALGIAS: 1
ENDOCRINE NEGATIVE: 1
WEAKNESS: 1
ALLERGIC/IMMUNOLOGIC NEGATIVE: 1
GASTROINTESTINAL NEGATIVE: 1
BACK PAIN: 1
PSYCHIATRIC NEGATIVE: 1
CARDIOVASCULAR NEGATIVE: 1
EYES NEGATIVE: 1
HEMATOLOGIC/LYMPHATIC NEGATIVE: 1
FATIGUE: 1
RESPIRATORY NEGATIVE: 1

## 2024-03-12 NOTE — PROGRESS NOTES
Patient ID: Sonam Saab is a 68 y.o. female.  Referring Physician: No referring provider defined for this encounter.  Primary Care Provider: Andrea Maddox MD  Date of Service:  3/12/2024    She is a 68 year old female with an extensive medical history of HTN, SVT, Pre diabetes, obesity, sarcoidosis, cataracts, glaucoma, arthritis, and shortness of breath.  She presents to clinic as a referral from Dr. Fam in nephrology for newly detected lambda light chain 0.1 g/dL M protein.      Workup:   SPEP: 5/11 M protein 0.1 g/dL lambda light chain, analysis on 5/16 showed 0.1 g/dL kappa light chains.  Light chains: LFLC 5/11 61.13, LFLC 5/16 43.58  24 hour urine: showed total protein of 398  UPEP pending from 5/27  PET/CT 6/7 showed nonspecific focal hypermetabolic activity within gastroesophageal junction may be inflammatory vs. neoplasm. Recommended follow up with endoscopy.   BMBx 7/13 3-5% Lambda restricted plasma cells   EGD/Colonoscopy negative    Medical History:  HTN  SVT  Pre diabetes  Morbid Obesity  Shortness of breath  Chronic cough  Vertigo  Cataracts  Glaucoma  Arthritis   Sarcoidosis      Surgical History:  Adenoidectomy  Cholecystectomy   Cochlear Implant Surgery  Complete Colonoscopy  Diagnostic Esophagogastroduodenoscopy   Gastric Surgery  Sinus Surgery  Tonsillectomy   Total Hysterectomy      Family History:  CHF, Breast cancer, arthritis, colon cancer, DM, HTN     Social History:  Social drinker  Daily caffeine consumption    Retired post   Former smoker  SUBJECTIVE:  History of Present Illness:  Sonam presents to clinic 3/12/24 for a follow up phone call.    Overall she is doing well.     Has started Wegovy for weight loss. No issues as of this time. However, she is worried regarding side effects.       Review of Systems   Constitutional:  Positive for fatigue.   HENT: Negative.     Eyes: Negative.    Respiratory: Negative.     Cardiovascular: Negative.    Gastrointestinal:  Negative.    Endocrine: Negative.    Genitourinary: Negative.    Musculoskeletal:  Positive for arthralgias and back pain.   Skin: Negative.    Allergic/Immunologic: Negative.    Neurological:  Positive for weakness.   Hematological: Negative.    Psychiatric/Behavioral: Negative.       OBJECTIVE:  KPS: Karnofsky Score: 80 - Normal activity with effort; some signs or symptoms of disease   VS:  There were no vitals taken for this visit.  BSA: There is no height or weight on file to calculate BSA.    Laboratory:  The pertinent laboratory results were reviewed and discussed with the patient.    Lab Results   Component Value Date    WBC 5.0 03/11/2024    HCT 40.7 03/11/2024    HGB 12.8 03/11/2024     03/11/2024    K 3.6 03/11/2024    CALCIUM 9.3 03/11/2024     03/11/2024    MG 1.90 06/20/2022    ALT 7 03/11/2024    AST 10 03/11/2024    BUN 15 03/11/2024    CREATININE 0.90 03/11/2024    PHOS 4.2 12/01/2022    KAPPA 3.74 (H) 03/11/2024    LAMBDA 39.27 (H) 03/11/2024    KAPLS 0.10 (L) 03/11/2024    SPEP ABNORMAL 08/29/2023    IEPIN ABNORMAL 08/29/2023    IGG 1,060 08/29/2023    IGM 67 08/29/2023     08/29/2023      Note: for a comprehensive list of the patient's lab results, access the Results Review activity.    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 neoplasm related  - Followed with GI, has endoscopy scheduled for 8/2022-negative for neoplastic process  - BMBx 7/13 showed 3-5% lambda restricted plasma cells  - Labs stable     Cards:  - Pt.  of Dr. Mark Gilliland  - History of HTN, SVT  - On Metoprolol 200mg daily, Amlodipine 10mg daily, and Furosemide 40mg daily  - Swelling has improved but is still there, goes to lymphedema clinic      Renal:  - Referred to nephrology by Dr. Gilliland   - Follows with Dr. Cristel Walter:  - History of cough, SOB  - Pulm follow up 6/8 (per pt.)  - Had high resolution CT, showed concern for pulmonary HTN, aortic valve disease, and a prominent subcarinal lymph node.   - Awaiting follow up with Dr. MONSON, called this AM  - Just completed prednisone, cough has cleared up SOB has improved  - Had COVID in 11/2023, residual cough     RTC:  6 months w/ labs prior     KEITH Osuna-CNP

## 2024-03-13 NOTE — PROGRESS NOTES
Sonam Saab female   1955 68 y.o.   [id number]      Chief Complaint  Left breast mass    History Of Present Illness  Sonam Saab is a very pleasant 68 y.o. AA female [facility]. She first noticed the lump in 2024. She denies trauma to the breast. She has family history of breast cancer in her maternal great aunt. She has a history of right breast excisional biopsy, benign in . She had imaging performed with [facility] in 2024 and requests second opinion.     BREAST IMAGING: [facility] 2024 Left diagnostic mammogram and ultrasound, indicates BI-RADS Category 3. Left breast, 5:00, 7 cm from the nipple, 1.1 x 0.5 x 1.0 cm oval hyperechoic mass warranting short term follow up. 2023 Bilateral screening mammogram, indicates BI-RADS Category 0. Right breast asymmetry warranting additional views. 2023 Right diagnostic mammogram, indicates BI-RADS Category 2. 2023 Right diagnostic mammogram, indicates BI-RADS Category 2.    REPRODUCTIVE HISTORY: menarche age 14, , OCP's x 10-20 years, menopause age unknown (hysterectomy in  due to benign fibroids), ovaries intact, no HRT, scattered fibroglandular tissue    FAMILY CANCER HISTORY:   Maternal Great Aunt: Breast cancer  Maternal Grandmother: Colon cancer     Surgical History  She has a past surgical history that includes Total abdominal hysterectomy w/ bilateral salpingoophorectomy (2013); Cholecystectomy (2013); Gastric restriction surgery (2013); Other surgical history (2019); Adenoidectomy (11/10/2015); Sinus surgery (11/10/2015); Tonsillectomy (11/10/2015); Esophagogastroduodenoscopy (2014); and Colonoscopy (10/22/2013).     Social History  She reports that she has quit smoking. Her smoking use included cigarettes. She does not have any smokeless tobacco history on file. No history on file for alcohol use and drug use.    Family History  Family History    Problem Relation Name Age of Onset    Heart failure Mother      COPD Mother      COPD Sister      Mitral valve prolapse Sister      Breast cancer Maternal Grandmother      Colon cancer Maternal Grandmother      Diabetes Other      Arthritis Other      Colon cancer Other      Hypertension Other      Hyperlipidemia Other          Allergies  Rubber, unspecified; Ace inhibitors; Amoxicillin; Codeine; Latex; Ragweed; and Adhesive tape-silicones    Medications  Current Outpatient Medications   Medication Instructions    acyclovir (Zovirax) 5 % ointment Acyclovir 5 % External Ointment Quantity: 15 Refills: 0 Ordered: 27-May-2021 DO Start : 27-May-2021 Active    albuterol (ProAir HFA) 90 mcg/actuation inhaler 1-2 puffs, inhalation, Every 4 hours PRN    albuterol 2.5 mg, inhalation, Every 4 hours PRN    amLODIPine (NORVASC) 5 mg, oral, Daily    ammonium lactate (Amlactin) 12 % cream 1 Application, Topical, 2 times daily    Anti-DiarrheaL, loperamide, 2 mg tablet TAKE 1 TABLET BY MOUTH TWICE DAILY AS NEEDED (DIARRHEA).    aspirin 81 mg, oral, Daily    b complex 0.4 mg tablet oral, Daily RT    baclofen (LIORESAL) 10 mg, oral, 2 times daily    betamethasone dipropionate 0.05 % cream Betamethasone Dipropionate 0.05 % External Cream Quantity: 45 Refills: 0 Ordered: 27-Jul-2021 DO Start : 27-Jul-2021 Complete    BinaxNOW COVID-19 Ag Self Test kit TEST AS DIRECTED TODAY    budesonide-formoteroL (Symbicort) 80-4.5 mcg/actuation inhaler inhalation, Every 12 hours    buPROPion SR (Wellbutrin SR) 150 mg 12 hr tablet     buPROPion XL (Wellbutrin XL) 150 mg 24 hr tablet oral    buPROPion XL (WELLBUTRIN XL) 150 mg, oral, Daily RT    cetirizine (ZYRTEC) 10 mg, oral, 2 times daily    cholecalciferol (VITAMIN D-3) 50,000 Units, oral, Weekly    clobetasol (Temovate) 0.05 % ointment 1 Application, Topical, 2 times daily PRN    clomiPRAMINE (Anafranil) 25 mg capsule oral    clotrimazole (Lotrimin) 1 % cream Topical, 2 times daily     clotrimazole-betamethasone (Lotrisone) cream 1 Application, Topical, 2 times daily    desonide (DesOwen) 0.05 % cream APPLY TO AFFECTED AREAS OF NECK TWICE DAILY, FIVE DAYS A WEEK FOR MAINTENANCE.    desoximetasone (Topicort) 0.25 % ointment 1 Application, Topical    dexAMETHasone (Decadron) 6 mg tablet oral    dextromethorphan-guaifenesin  mg/5 mL liquid 5 mL, oral    diclofenac epolamine 1.3 % patch transdermal, Every 12 hours    dicyclomine (BENTYL) 10 mg, oral, Every 8 hours PRN    econazole nitrate 1 % cream Every 12 hours    ergocalciferol (Vitamin D-2) 1.25 MG (90515 UT) capsule oral    FLUoxetine (PROzac) 40 mg capsule oral    fluticasone propion-salmeteroL (Advair Diskus) 100-50 mcg/dose diskus inhaler inhalation, Every 12 hours    furosemide (LASIX) 20 mg, oral, Daily    hydrocortisone 2.5 % ointment     hydroxychloroquine (PLAQUENIL) 200 mg, oral, 2 times daily    hydrOXYzine HCL (Atarax) 25 mg tablet TAKE 1 TABLET BY MOUTH TWICE DAILY AS NEEDED FOR ITCHING. DO NOT DRIVE ON THIS MEDICATION    ketoconazole (NIZOral) 2 % shampoo APPLY TO SCALP ONCE PER WEEK. LEAVE ON FOR 10-15 MINUTES BEFORE WASHING OUT. -NOT COVERED-    loratadine (CLARITIN) 10 mg, oral, Daily    losartan (Cozaar) 100 mg tablet     losartan-hydrochlorothiazide (Hyzaar) 50-12.5 mg tablet 1 tablet, oral, Daily RT    lubiprostone (AMITIZA) 24 mcg, oral, 2 times daily with meals    metoprolol succinate XL (TOPROL-XL) 200 mg, oral, Daily    mirabegron (Myrbetriq) 50 mg tablet extended release 24 hr 24 hr tablet oral    moxifloxacin (Vigamox) 0.5 % ophthalmic solution 1 drop, ophthalmic (eye), 4 times daily    mupirocin (Bactroban) 2 % ointment Mupirocin 2 % External Ointment Quantity: 22 Refills: 0 Ordered: 3-Nov-2021 DO Start : 3-Nov-2021 Complete    naloxone (NARCAN) 4 mg, nasal, As needed, May repeat every 2-3 minutes if needed, alternating nostrils, until medical assistance becomes available.    nitrofurantoin,  macrocrystal-monohydrate, (Macrobid) 100 mg capsule 1 capsule, oral, Every 12 hours    nystatin (Mycostatin) 100,000 unit/gram powder Nystop 759428 UNIT/GM External Powder APPLY 1 GRAM TOPICALLY TO SKIN FOLD TWICE DAILY Quantity: 15 Refills: 0 Ordered: 30-Nov-2022 DO Start : 29-Nov-2022 Complete    oxybutynin (Ditropan) 5 mg tablet     oxyCODONE-acetaminophen (Percocet) 7.5-325 mg tablet 1 tablet, oral, Every 8 hours PRN    [START ON 4/16/2024] oxyCODONE-acetaminophen (Percocet) 7.5-325 mg tablet 1 tablet, oral, Every 8 hours PRN    oxygen (O2) 2 L/min, inhalation    pravastatin (PRAVACHOL) 20 mg, oral, Daily    prednisoLONE acetate (Pred-Forte) 1 % ophthalmic suspension 1 drop, ophthalmic (eye)    QUEtiapine (SEROquel) 25 mg tablet oral    RABEprazole (ACIPHEX) 20 mg, oral, Daily    semaglutide (weight loss) (WEGOVY) 0.25 mg, subcutaneous, Weekly    semaglutide (weight loss) 0.5 mg, subcutaneous, Weekly    sennosides (Senokot) 8.6 mg tablet oral    sodium chloride 0.9% (sodium chloride) flush 10 mL, intravenous    tiZANidine (ZANAFLEX) 2-4 mg, oral, Nightly    traZODone (DESYREL) 50 mg, oral, Nightly    triamcinolone (Kenalog) 0.1 % cream APPLY TO AFFECTED AREAS 3-4 TIMES DAILY. DO NOT USE ON FACE.    triamterene-hydrochlorothiazid (Maxzide) 75-50 mg tablet oral    Wixela Inhub 100-50 mcg/dose diskus inhaler 1 puff, inhalation, Every 12 hours         REVIEW OF SYSTEMS    Constitutional:  Negative for appetite change, fatigue, fever and unexpected weight change.   HENT:  Negative for ear pain, hearing loss, nosebleeds, sore throat and trouble swallowing.    Eyes:  Negative for discharge, itching and visual disturbance.   Respiratory:  Negative for cough, chest tightness and shortness of breath.    Cardiovascular:  Negative for chest pain, palpitations and leg swelling.   Breast: as indicated in HPI  Gastrointestinal:  Negative for abdominal pain, constipation, diarrhea and nausea.   Endocrine: Negative for cold  intolerance and heat intolerance.   Genitourinary:  Negative for dysuria, frequency, hematuria, pelvic pain and vaginal bleeding.   Musculoskeletal:  Negative for arthralgias, back pain, gait problem, joint swelling and myalgias.   Skin:  Negative for color change and rash.   Allergic/Immunologic: Negative for environmental allergies and food allergies.   Neurological:  Negative for dizziness, tremors, speech difficulty, weakness, numbness and headaches.   Hematological:  Does not bruise/bleed easily.   Psychiatric/Behavioral:  Negative for agitation, dysphoric mood and sleep disturbance. The patient is not nervous/anxious.         Past Medical History  She has a past medical history of Depression, unspecified (04/02/2019), Essential (primary) hypertension (11/05/2013), Pain in unspecified knee, Pain in unspecified limb, Personal history of other diseases of the digestive system, Personal history of other diseases of the digestive system, Personal history of other diseases of the musculoskeletal system and connective tissue, Personal history of other diseases of the musculoskeletal system and connective tissue, Personal history of other diseases of the nervous system and sense organs, Personal history of other diseases of the nervous system and sense organs, Personal history of other diseases of the nervous system and sense organs, Personal history of other specified conditions (07/16/2018), Personal history of other specified conditions, Personal history of other specified conditions, Sprain of ligaments of lumbar spine, initial encounter (04/18/2013), and Unspecified asthma, uncomplicated (06/22/2022).     Physical Exam  Patient is alert and oriented x3 and in a relaxed and appropriate mood. Her gait is unsteady and hand grasps are equal. Sclera is clear. She is in a wheelchair for ambulation and on continuous oxygen via nasal cannula. The exam was limited and performed in the chair. The breasts are nearly  symmetrical. The tissue is soft without palpable abnormalities, discrete nodules or masses. The right breast has a well-healed excisional biopsy incision superior central quadrant. The skin and nipples appear normal. There is no cervical, supraclavicular or axillary lymphadenopathy. Heart rate and rhythm normal, S1 and S2 appreciated. The lungs are clear to auscultation bilaterally. Abdomen is soft and non-tender.       Physical Exam  Chest:              Last Recorded Vitals  Vitals:    03/21/24 1457   BP: 135/83   Pulse: 60     Assessment/Plan   Stable clinical exam and imaging ([facility]), left breast mass, stable, family history of breast cancer, scattered fibroglandular tissue, history of right excisional biopsy, benign    Plan: Request images from [facility] and outside consult ordered. Will call with recommendations.     Patient Discussion/Summary  Your clinical examination and imaging are stable. We will request your breast images from [facility] and I will call you with recommendations.      You can see your health information, review clinical summaries from office visits & test results online when you follow your health with MY  Chart, a personal health record. To sign up go to [email or web address]. If you need assistance with signing up or trouble getting into your account call [facility] Patient Line 24/7 at [phone].    My office phone number is [phone] if you need to get in touch with me or have additional questions or concerns. Thank you for choosing [facility] and trusting me as your healthcare provider. I look forward to seeing you again at your next office visit. I am honored to be a provider on your health care team and I remain dedicated to helping you achieve your health goals.      Olinda To, APRN-CNP   14-Jul-2019

## 2024-03-14 LAB
ALBUMIN: 3.5 G/DL (ref 3.4–5)
ALPHA 1 GLOBULIN: 0.3 G/DL (ref 0.2–0.6)
ALPHA 2 GLOBULIN: 0.6 G/DL (ref 0.4–1.1)
BETA GLOBULIN: 1 G/DL (ref 0.5–1.2)
GAMMA GLOBULIN: 1 G/DL (ref 0.5–1.4)
IMMUNOFIXATION COMMENT: NORMAL
PATH REVIEW - SERUM IMMUNOFIXATION: NORMAL
PATH REVIEW-SERUM PROTEIN ELECTROPHORESIS: NORMAL
PROTEIN ELECTROPHORESIS COMMENT: NORMAL

## 2024-03-19 ENCOUNTER — OFFICE VISIT (OUTPATIENT)
Dept: PAIN MEDICINE | Facility: CLINIC | Age: 69
End: 2024-03-19
Payer: OTHER GOVERNMENT

## 2024-03-19 DIAGNOSIS — S33.5XXA LUMBAR SPRAIN, INITIAL ENCOUNTER: ICD-10-CM

## 2024-03-19 DIAGNOSIS — S43.402A SPRAIN OF LEFT SHOULDER, UNSPECIFIED SHOULDER SPRAIN TYPE, INITIAL ENCOUNTER: Primary | ICD-10-CM

## 2024-03-19 DIAGNOSIS — S51.011A LACERATION OF RIGHT ELBOW, INITIAL ENCOUNTER: ICD-10-CM

## 2024-03-19 DIAGNOSIS — S80.02XA CONTUSION OF LEFT KNEE, INITIAL ENCOUNTER: ICD-10-CM

## 2024-03-19 PROCEDURE — 99214 OFFICE O/P EST MOD 30 MIN: CPT | Performed by: PHYSICAL MEDICINE & REHABILITATION

## 2024-03-19 RX ORDER — OXYCODONE AND ACETAMINOPHEN 7.5; 325 MG/1; MG/1
1 TABLET ORAL EVERY 8 HOURS PRN
Qty: 84 TABLET | Refills: 0 | Status: SHIPPED | OUTPATIENT
Start: 2024-04-16 | End: 2024-05-09 | Stop reason: SDUPTHER

## 2024-03-19 RX ORDER — TIZANIDINE 2 MG/1
2-4 TABLET ORAL NIGHTLY
Qty: 30 TABLET | Refills: 1 | Status: SHIPPED | OUTPATIENT
Start: 2024-03-19 | End: 2024-05-09 | Stop reason: SDUPTHER

## 2024-03-19 RX ORDER — OXYCODONE AND ACETAMINOPHEN 7.5; 325 MG/1; MG/1
1 TABLET ORAL EVERY 8 HOURS PRN
Qty: 84 TABLET | Refills: 0 | Status: SHIPPED | OUTPATIENT
Start: 2024-03-19 | End: 2024-04-16

## 2024-03-19 RX ORDER — TRAZODONE HYDROCHLORIDE 50 MG/1
50 TABLET ORAL NIGHTLY
Qty: 30 TABLET | Refills: 1 | Status: SHIPPED | OUTPATIENT
Start: 2024-03-19 | End: 2024-05-09 | Stop reason: SDUPTHER

## 2024-03-19 NOTE — PROGRESS NOTES
DOL claim [id number]  DOI 08/30/2019       · Contusion of knee, left  (S80.02XA)   · Elbow laceration, right, initial encounter  (S51.011A)   · Sprain ligaments of lumbar spine S33.5XXA   . Unsp Sprain left shoulder joint, initial encounter S43.402A         Chief complaint  Back pain   Left knee and left shoulder pain    History  Sonam Saab is back for pain management office visit  Continue with pain in the back lately the worst  The pain in the back is deep on both sides.  The pain is above the belt line, it is continuous but it gets worse with extension of the lumbar spine to either side. The pain improves with leaning forward.  Extension combined with rotation to the either side worsens the pain.  There are no reported radiation of the pain to the lower limbs.     No bowel or bladder incontinence.  No sensory or motor changes in the lower limbs.    No changes or in the color or textures of the skin of the lower limbs.     The pain in the left shoulder is deep and stabbing.  It is associated with sharp sensation inside the shoulder joint mainly with movements.  The pain is continuous at rest and it gets worse with reaching forward and overhead.  Also reaching outward increases the pain.  There is no radiation of the pain to the upper limb.  The pain is associated with tight muscle bands around the shoulder blade.  These gets worse with shoulder stabilization like working at shoulder level or even doing any activity with the upper limb when it is not supported.  There is occasional sensation of fine cracking inside the shoulder joint when the shoulder position is changed rapidly.  Also occasionally there is a sensation of fullness inside the shoulder joint.  The left shoulder catches when it reaches at shoulder level.  With maneuvering and external rotation the shoulder can go up few more degrees.  The left shoulder gets tired quickly with any activity.    Examination of the left knee showed mild clinical  effusion. Normal skin color and texture palpation of the knee showed tenderness over the medial and lateral joint line and the sensation of crepitation upon range of motion.  Range of motion from -2 degrees to 105 degrees. No medial lateral instability. No drawer sign.  Lachman is negative.  Positive Robert both medially and laterally.  Negative pivot shift.  Homans' sign is negative.  Calves are soft.  Knee flexion and extension is 4/5 and limited by the pain.       Pain level without medication is 8/10 , with the medication pain level 3/10.     The pain meds are helping control the pain and improving Activities of Daily living and quality of life and quality of sleep.    opioids treatment agreement jan 2024     Oarrs pulled and scanned in the chart  no concerns  last urine toxicology testing earlier this year and it was compliant we will repeat  Xray updated spine and joints   ORT Score is  0  Pain pathology and pain generators spine   Modalities tried injection, surgery, physical therapy, TENS unit, nonsteroidal anti-inflammatory medication       Denied any fever or chills. No weight loss and no night sweats. No cough or sputum production. No diarrhea   The constipation has been responding to fibers and over the counter medications.     No bladder and bowel incontinence and no other changes in bladder and bowel. No skin changes.  Reports tiredness and fatigability only if the pain is not controlled.   Denied opioids diversion and abuse and denies alcoholism. Denies overuse of the pain medications.    The control of the pain with the pain medications is helping the control of the symptoms and allowing the function and activities of daily living, enjoyment of life, improving the quality of life and sleep with less interruption by the pain. The goal is symptomatic control of the nonmalignant chronic pain and not to repair the permanent damage in the tissues inducing the chronic pain conditions. We are aiming to  shift the focus from the nonmalignant chronic pain to other aspects of life by symptomatically treating this chronic pain. If this pain is not treated it will lead to major morbidity and it is also associated with increased risks of mortality. The patient understands those very clearly and also understand high risks of morbidity and mortality if not strictly adherent to the treatment recommendations and reporting any associated side effects. Also patient understand the full responsibility associated with these medications to avoid abuse or overuse or any use of these medications for anything besides treating the patient's own chronic pain and nothing else under any circumstances.        Physical examination  Awake, alert and oriented for time place and persons   declined Chaperone for the visit and was adequately  draped for the exam.    Examination of the lumbar spine showed mild reversal of the lumbar lordosis .    Tight muscle bands over the   lower lumbar paraspinals area.  Shah test on the   lower lumbar area increases the pain with stabilization of the lower lumbar facets bilaterally at  L45 and L5S1,  combined with extension and rotation of the lumbar spine to the eith side reproduced and worsened the back pain on that side.  The pain improved with leaning forward.  Straight leg raising was negative.  No sensorimotor deficits in the lower limbs.  Dangelo testing were negative for axial loading and log rotation.  No aberrant pain behavior.      Examination of the left knee showed mild clinical effusion. Normal skin color and texture palpation of the knee showed tenderness over the medial and lateral joint line and the sensation of crepitation upon range of motion.  Range of motion from -2 degrees to 105 degrees. No medial lateral instability. No drawer sign.  Lachman is negative.  Positive Robert both medially and laterally.  Negative pivot shift.  Homans' sign is negative.  Calves are soft.  Knee flexion  and extension is 4/5 and limited by the pain.     Diagnosis  Problem List Items Addressed This Visit       Laceration of right elbow    Relevant Medications    oxyCODONE-acetaminophen (Percocet) 7.5-325 mg tablet    oxyCODONE-acetaminophen (Percocet) 7.5-325 mg tablet (Start on 4/16/2024)    traZODone (Desyrel) 50 mg tablet    tiZANidine (Zanaflex) 2 mg tablet    Lumbar sprain    Relevant Medications    oxyCODONE-acetaminophen (Percocet) 7.5-325 mg tablet    oxyCODONE-acetaminophen (Percocet) 7.5-325 mg tablet (Start on 4/16/2024)    traZODone (Desyrel) 50 mg tablet    tiZANidine (Zanaflex) 2 mg tablet    Other Relevant Orders    Tens Device Four Lead    Sprain of shoulder, left - Primary    Relevant Medications    oxyCODONE-acetaminophen (Percocet) 7.5-325 mg tablet    oxyCODONE-acetaminophen (Percocet) 7.5-325 mg tablet (Start on 4/16/2024)    traZODone (Desyrel) 50 mg tablet    tiZANidine (Zanaflex) 2 mg tablet    Other Relevant Orders    Tens Device Four Lead    Contusion of knee    Relevant Medications    oxyCODONE-acetaminophen (Percocet) 7.5-325 mg tablet    oxyCODONE-acetaminophen (Percocet) 7.5-325 mg tablet (Start on 4/16/2024)    traZODone (Desyrel) 50 mg tablet    tiZANidine (Zanaflex) 2 mg tablet    Other Relevant Orders    Tens Device Four Lead        Plan  Reviewed the pain generators.  Went over the types of pain with neuropathic and nociceptive and different pathologies and therapeutic modalities. Discussed the mechanism of action of interventions from acupuncture, physical therapy , regular exercises, injections, botox, spinal cord stimulation, and role of surgery     Went over pathology of the intervertebral disc displacement and the anatomical relation to the Nerve roots and relation to the radicular symptoms. Went over treatment modalities with conservative treatment including acupuncture   and epidural steroid injection with fluoroscopy guidance and last resort of surgery    Based on the above  findings and the clinical response to the opioids medications and improvement of the activities of daily living, sleep, and work performance. We made this complex decision to continue the opioids therapy in light of the evidence of the patient's responsibility in using the pain medications as prescribed for the nonmalignant chronic pain condition. We discussed about the use of the pain medications to treat the symptoms of chronic nonmalignant pain and we are not trying the repair the permanent damage in the tissues, rather we are trying to control the symptoms induced by the permanent damage to the tissues inducing the chronic pain condition and resulting disability. I explained the difference and discussed it with the patient and stressed the importance of knowing the difference especially because of the potential side effects and the potential addicting effect and habit forming nature of the dangerous drugs we are using to treat the symptoms of the chronic pain.      We discussed that we are prescribing the medications on good quinn and legitimate medical reason.     We reviewed the side effects and precautions of opioids prescriptions as discussed in the opioids treatment agreement.    realizes the interaction between the therapeutic classes including the respiratory depression and potential death     Random drug testing twice in 6 months we will submit     Oxycodone 7.5 mg 3 times a day  has a narcan at home know how and when to use it if needed.   Discussed about considering cut back on pain medications   Consider injections for aggravation   The TENS unjit is not working and will need a new one    Discussed about NSAIDS and I explained about the opioids sparing effect to allow keeping the opioids dose at minimal effective dose.   I went over the potential side effects of the NSAIDS on the gastrointestinal, renal and cardiovascular systems.      I detailed the side effects from the acetaminophen in the  medication and made aware of those. I also explained about the cumulative effects on the organs and mainly the liver.     Given the opioids therapy , we discussed about the risk for accidental over dose on the pain medications, either for patient or other household. I went over the mechanism of action and mode of use of the Naloxone according to the  recommendations. I will provide a prescription for a kit.     Follow-up 8 weeks or earlier if needed     The level of clinical decision making in this office visit,  is high, given the high risks of complications with the morbidity and mortality due to the fact that acute and chronic pain may pose a threat to life and bodily function, if under treated, poorly treated, or with failure to maintain adequate treatment and timely medical follow up. Additionally over treatment has its own set of complications including overdosing on the pain medications and also the habit forming potentials with the use of the medications used to treat chronic painful conditions including therapeutic classes classified as dangerous medications. Given the serious and fluctuating nature of pain level and instensity with extensive consideration for whenever pain changes, there is always the risk of prolonged functional impairment requiring close patient monitoring with regular assessments and reassessments and high level medical decision making at every office visit. The amount and complexity of data reviewed is high given the patient clinical presentation, labs,  data, radiology reports, and other tests as discussed during office visits. Pertinent data whether positive or negative were taken in consideration in the process of making this high level medical decision.

## 2024-03-21 ENCOUNTER — OFFICE VISIT (OUTPATIENT)
Dept: SURGICAL ONCOLOGY | Facility: CLINIC | Age: 69
End: 2024-03-21
Payer: MEDICARE

## 2024-03-21 VITALS
BODY MASS INDEX: 47.75 KG/M2 | HEART RATE: 60 BPM | SYSTOLIC BLOOD PRESSURE: 135 MMHG | DIASTOLIC BLOOD PRESSURE: 83 MMHG | WEIGHT: 293 LBS

## 2024-03-21 DIAGNOSIS — R92.8 ABNORMAL MAMMOGRAM: Primary | ICD-10-CM

## 2024-03-21 DIAGNOSIS — N63.23 MASS OF LOWER OUTER QUADRANT OF LEFT BREAST: ICD-10-CM

## 2024-03-21 PROCEDURE — 99213 OFFICE O/P EST LOW 20 MIN: CPT | Performed by: NURSE PRACTITIONER

## 2024-03-21 PROCEDURE — 3079F DIAST BP 80-89 MM HG: CPT | Performed by: NURSE PRACTITIONER

## 2024-03-21 PROCEDURE — 1126F AMNT PAIN NOTED NONE PRSNT: CPT | Performed by: NURSE PRACTITIONER

## 2024-03-21 PROCEDURE — 4010F ACE/ARB THERAPY RXD/TAKEN: CPT | Performed by: NURSE PRACTITIONER

## 2024-03-21 PROCEDURE — 3075F SYST BP GE 130 - 139MM HG: CPT | Performed by: NURSE PRACTITIONER

## 2024-03-21 PROCEDURE — 1160F RVW MEDS BY RX/DR IN RCRD: CPT | Performed by: NURSE PRACTITIONER

## 2024-03-21 PROCEDURE — 3060F POS MICROALBUMINURIA REV: CPT | Performed by: NURSE PRACTITIONER

## 2024-03-21 PROCEDURE — 1159F MED LIST DOCD IN RCRD: CPT | Performed by: NURSE PRACTITIONER

## 2024-03-21 ASSESSMENT — PAIN SCALES - GENERAL: PAINLEVEL: 0-NO PAIN

## 2024-03-21 NOTE — PATIENT INSTRUCTIONS
Your clinical examination and imaging are stable. We will request your breast images from [facility] and I will call you with recommendations.      You can see your health information, review clinical summaries from office visits & test results online when you follow your health with MY  Chart, a personal health record. To sign up go to [email or web address]. If you need assistance with signing up or trouble getting into your account call [facility] Patient Line 24/7 at [phone].    My office phone number is [phone] if you need to get in touch with me or have additional questions or concerns. Thank you for choosing [facility] and trusting me as your healthcare provider. I look forward to seeing you again at your next office visit. I am honored to be a provider on your health care team and I remain dedicated to helping you achieve your health goals.

## 2024-04-04 ENCOUNTER — APPOINTMENT (OUTPATIENT)
Dept: PAIN MEDICINE | Facility: CLINIC | Age: 69
End: 2024-04-04
Payer: MEDICARE

## 2024-05-09 ENCOUNTER — OFFICE VISIT (OUTPATIENT)
Dept: PAIN MEDICINE | Facility: CLINIC | Age: 69
End: 2024-05-09
Payer: OTHER GOVERNMENT

## 2024-05-09 DIAGNOSIS — Z79.891 LONG TERM CURRENT USE OF OPIATE ANALGESIC: ICD-10-CM

## 2024-05-09 DIAGNOSIS — S43.402A SPRAIN OF LEFT SHOULDER, UNSPECIFIED SHOULDER SPRAIN TYPE, INITIAL ENCOUNTER: ICD-10-CM

## 2024-05-09 DIAGNOSIS — S80.02XA CONTUSION OF LEFT KNEE, INITIAL ENCOUNTER: Primary | ICD-10-CM

## 2024-05-09 DIAGNOSIS — S33.5XXA LUMBAR SPRAIN, INITIAL ENCOUNTER: ICD-10-CM

## 2024-05-09 DIAGNOSIS — S51.011A LACERATION OF RIGHT ELBOW, INITIAL ENCOUNTER: ICD-10-CM

## 2024-05-09 PROCEDURE — 99214 OFFICE O/P EST MOD 30 MIN: CPT | Performed by: PHYSICAL MEDICINE & REHABILITATION

## 2024-05-09 RX ORDER — OXYCODONE AND ACETAMINOPHEN 7.5; 325 MG/1; MG/1
1 TABLET ORAL EVERY 8 HOURS PRN
Qty: 84 TABLET | Refills: 0 | Status: SHIPPED | OUTPATIENT
Start: 2024-05-14 | End: 2024-06-11

## 2024-05-09 RX ORDER — TRAZODONE HYDROCHLORIDE 50 MG/1
50 TABLET ORAL NIGHTLY
Qty: 30 TABLET | Refills: 1 | Status: SHIPPED | OUTPATIENT
Start: 2024-05-09 | End: 2024-06-08

## 2024-05-09 RX ORDER — TIZANIDINE 2 MG/1
2-4 TABLET ORAL NIGHTLY
Qty: 84 TABLET | Refills: 1 | Status: SHIPPED | OUTPATIENT
Start: 2024-05-09 | End: 2024-06-06

## 2024-05-09 RX ORDER — LUBIPROSTONE 24 UG/1
24 CAPSULE ORAL
Qty: 60 CAPSULE | Refills: 1 | Status: SHIPPED | OUTPATIENT
Start: 2024-05-09 | End: 2024-06-08

## 2024-05-09 RX ORDER — OXYCODONE AND ACETAMINOPHEN 7.5; 325 MG/1; MG/1
1 TABLET ORAL EVERY 8 HOURS PRN
Qty: 84 TABLET | Refills: 0 | Status: SHIPPED | OUTPATIENT
Start: 2024-06-11 | End: 2024-07-09

## 2024-05-09 NOTE — PROGRESS NOTES
DOL claim [id number]  DOI 08/30/2019       · Contusion of knee, left  (S80.02XA)   · Elbow laceration, right, initial encounter  (S51.011A)   · Sprain ligaments of lumbar spine S33.5XXA   . Unsp Sprain left shoulder joint, initial encounter S43.402A    Chief complaint  Pain in back and left knee pain     History  Sonam Saab is back for pain management office visit  Her back gave out on her and landed on the left knee and that aggravated the pain in the left knee  The pain in the left  knee is deep achy stabbing.  It is both on the medial and lateral aspects and behind the kneecap.  The knee pain is associated with sensation of crunching upon range of motion and weightbearing.  The pain is continuous at rest associated with stiffness with prolonged sitting and get worse with standing walking or any weightbearing activity.  Occasionally there is knee swelling.  No change in color or texture of the skin.  No pain proximal to the thigh or distal to the leg associated with the knee pain.  With episodes of aggravation of the pain there are occasion of sensation of instability but no falls.    Also back pain still aggravated       Pain level without medication is 8/10 , with the medication pain level 4 to 5 /10. For the recent aggravation  above      The pain meds are helping control the pain and improving Activities of Daily living and quality of life and quality of sleep.    opioids treatment agreement jan 2024  Pill count today, using count tray, and in front of patient :  16    pills , last fill was on 4/16  for 84 tabs,  the count is correct  Oarrs pulled and scanned in the chart  no concerns  last urine toxicology testing earlier this year and it was compliant we will repeat  Xray updated spine   ORT Score is  0  Pain pathology and pain generators spine and knee   Modalities tried injection, surgery, physical therapy, TENS unit, nonsteroidal anti-inflammatory medication       Denied any fever or chills. No  weight loss and no night sweats. No cough or sputum production. No diarrhea   The constipation has been responding to fibers and over the counter medications.     No bladder and bowel incontinence and no other changes in bladder and bowel. No skin changes.  Reports tiredness and fatigability only if the pain is not controlled.   Denied opioids diversion and abuse and denies alcoholism. Denies overuse of the pain medications.    The control of the pain with the pain medications is helping the control of the symptoms and allowing the function and activities of daily living, enjoyment of life, improving the quality of life and sleep with less interruption by the pain. The goal is symptomatic control of the nonmalignant chronic pain and not to repair the permanent damage in the tissues inducing the chronic pain conditions. We are aiming to shift the focus from the nonmalignant chronic pain to other aspects of life by symptomatically treating this chronic pain. If this pain is not treated it will lead to major morbidity and it is also associated with increased risks of mortality. The patient understands those very clearly and also understand high risks of morbidity and mortality if not strictly adherent to the treatment recommendations and reporting any associated side effects. Also patient understand the full responsibility associated with these medications to avoid abuse or overuse or any use of these medications for anything besides treating the patient's own chronic pain and nothing else under any circumstances.        Physical examination  Awake, alert and oriented for time place and persons   declined Chaperone for the visit and was adequately  draped for the exam.      Examination of the left knee showed mild clinical effusion. Normal skin color and texture palpation of the knee showed tenderness over the medial and lateral joint line and the sensation of crepitation upon range of motion.  Range of motion from -2  degrees to 105 degrees. No medial lateral instability. No drawer sign.  Lachman is negative.  Positive Robert both medially and laterally.  Negative pivot shift.  Homans' sign is negative.  Calves are soft.  Knee flexion and extension is 4/5 and limited by the pain.     Diagnosis  Problem List Items Addressed This Visit       Laceration of right elbow    Relevant Medications    oxyCODONE-acetaminophen (Percocet) 7.5-325 mg tablet (Start on 5/14/2024)    oxyCODONE-acetaminophen (Percocet) 7.5-325 mg tablet (Start on 6/11/2024)    tiZANidine (Zanaflex) 2 mg tablet    traZODone (Desyrel) 50 mg tablet    lubiprostone (Amitiza) 24 mcg capsule    Other Relevant Orders    Tens Device Four Lead    Lumbar sprain    Relevant Medications    oxyCODONE-acetaminophen (Percocet) 7.5-325 mg tablet (Start on 5/14/2024)    oxyCODONE-acetaminophen (Percocet) 7.5-325 mg tablet (Start on 6/11/2024)    tiZANidine (Zanaflex) 2 mg tablet    traZODone (Desyrel) 50 mg tablet    lubiprostone (Amitiza) 24 mcg capsule    Other Relevant Orders    Tens Device Four Lead    Sprain of shoulder, left    Relevant Medications    oxyCODONE-acetaminophen (Percocet) 7.5-325 mg tablet (Start on 5/14/2024)    oxyCODONE-acetaminophen (Percocet) 7.5-325 mg tablet (Start on 6/11/2024)    tiZANidine (Zanaflex) 2 mg tablet    traZODone (Desyrel) 50 mg tablet    lubiprostone (Amitiza) 24 mcg capsule    Other Relevant Orders    Tens Device Four Lead    Contusion of knee - Primary    Relevant Medications    oxyCODONE-acetaminophen (Percocet) 7.5-325 mg tablet (Start on 5/14/2024)    oxyCODONE-acetaminophen (Percocet) 7.5-325 mg tablet (Start on 6/11/2024)    tiZANidine (Zanaflex) 2 mg tablet    traZODone (Desyrel) 50 mg tablet    lubiprostone (Amitiza) 24 mcg capsule    Other Relevant Orders    Tens Device Four Lead     Other Visit Diagnoses       Long term current use of opiate analgesic        Relevant Orders    Opiate/Opioid/Benzo Prescription Compliance              Plan  Reviewed the pain generators.  Went over the types of pain with neuropathic and nociceptive and different pathologies and therapeutic modalities. Discussed the mechanism of action of interventions from acupuncture, physical therapy , regular exercises, injections, botox, spinal cord stimulation, and role of surgery     Went over pathology of the intervertebral disc displacement and the anatomical relation to the Nerve roots and relation to the radicular symptoms. Went over treatment modalities with conservative treatment including acupuncture   and epidural steroid injection with fluoroscopy guidance and last resort of surgery    Based on the above findings and the clinical response to the opioids medications and improvement of the activities of daily living, sleep, and work performance. We made this complex decision to continue the opioids therapy in light of the evidence of the patient's responsibility in using the pain medications as prescribed for the nonmalignant chronic pain condition. We discussed about the use of the pain medications to treat the symptoms of chronic nonmalignant pain and we are not trying the repair the permanent damage in the tissues, rather we are trying to control the symptoms induced by the permanent damage to the tissues inducing the chronic pain condition and resulting disability. I explained the difference and discussed it with the patient and stressed the importance of knowing the difference especially because of the potential side effects and the potential addicting effect and habit forming nature of the dangerous drugs we are using to treat the symptoms of the chronic pain.      We discussed that we are prescribing the medications on good quinn and legitimate medical reason.     We reviewed the side effects and precautions of opioids prescriptions as discussed in the opioids treatment agreement.    realizes the interaction between the therapeutic classes including the  respiratory depression and potential death     Random drug testing twice in 6 months we will submit     Please note that the claim is allowed for sprains and contusion as above however she has more injury that above. But this is a DOL claim and not all conditions on the approved facts. We are using e approved conditions for billing purposes and the pain meds are prescribed for the actual conditions that are inducing the pain     Oxycodone TID trazodone and tizanidine. Amitiza for OIC  has a narcan at home know how and when to use it if needed.   Long discussion about putting effort in cutting back on pain medications. Discussed about pain level changing and we do not know if the medications at this amount are still needed until we try and cut back slowly on pain medications. If the cut is tolerated then we continue. If cut not tolerated then, will go back on the pain medications level.  The goal from this is to keep the pain medications at the lowest effective dose.     TENS unit for left knee and back    Discussed about NSAIDS and I explained about the opioids sparing effect to allow keeping the opioids dose at minimal effective dose.   I went over the potential side effects of the NSAIDS on the gastrointestinal, renal and cardiovascular systems.      I detailed the side effects from the acetaminophen in the medication and made aware of those. I also explained about the cumulative effects on the organs and mainly the liver.     Given the opioids therapy , we discussed about the risk for accidental over dose on the pain medications, either for patient or other household. I went over the mechanism of action and mode of use of the Naloxone according to the  recommendations. I will provide a prescription for a kit.     Follow-up 8 weeks or earlier if needed     The level of clinical decision making in this office visit,  is high, given the high risks of complications with the morbidity and mortality due  to the fact that acute and chronic pain may pose a threat to life and bodily function, if under treated, poorly treated, or with failure to maintain adequate treatment and timely medical follow up. Additionally over treatment has its own set of complications including overdosing on the pain medications and also the habit forming potentials with the use of the medications used to treat chronic painful conditions including therapeutic classes classified as dangerous medications. Given the serious and fluctuating nature of pain level and instensity with extensive consideration for whenever pain changes, there is always the risk of prolonged functional impairment requiring close patient monitoring with regular assessments and reassessments and high level medical decision making at every office visit. The amount and complexity of data reviewed is high given the patient clinical presentation, labs,  data, radiology reports, and other tests as discussed during office visits. Pertinent data whether positive or negative were taken in consideration in the process of making this high level medical decision.

## 2024-06-03 ENCOUNTER — LAB (OUTPATIENT)
Dept: LAB | Facility: LAB | Age: 69
End: 2024-06-03
Payer: MEDICARE

## 2024-06-03 DIAGNOSIS — Z79.891 LONG TERM CURRENT USE OF OPIATE ANALGESIC: ICD-10-CM

## 2024-06-03 LAB
AMPHETAMINES UR QL SCN: NORMAL
BARBITURATES UR QL SCN: NORMAL
BZE UR QL SCN: NORMAL
CANNABINOIDS UR QL SCN: NORMAL
CREAT UR-MCNC: 258.4 MG/DL (ref 20–320)
PCP UR QL SCN: NORMAL

## 2024-06-03 PROCEDURE — 80354 DRUG SCREENING FENTANYL: CPT

## 2024-06-03 PROCEDURE — 80365 DRUG SCREENING OXYCODONE: CPT

## 2024-06-03 PROCEDURE — 80361 OPIATES 1 OR MORE: CPT

## 2024-06-03 PROCEDURE — 80307 DRUG TEST PRSMV CHEM ANLYZR: CPT

## 2024-06-03 PROCEDURE — 82570 ASSAY OF URINE CREATININE: CPT

## 2024-06-03 PROCEDURE — 80358 DRUG SCREENING METHADONE: CPT

## 2024-06-03 PROCEDURE — 80373 DRUG SCREENING TRAMADOL: CPT

## 2024-06-03 PROCEDURE — 80346 BENZODIAZEPINES1-12: CPT

## 2024-06-03 PROCEDURE — 80368 SEDATIVE HYPNOTICS: CPT

## 2024-06-06 LAB
1OH-MIDAZOLAM UR CFM-MCNC: <25 NG/ML
6MAM UR CFM-MCNC: <25 NG/ML
7AMINOCLONAZEPAM UR CFM-MCNC: <25 NG/ML
A-OH ALPRAZ UR CFM-MCNC: <25 NG/ML
ALPRAZ UR CFM-MCNC: <25 NG/ML
CHLORDIAZEP UR CFM-MCNC: <25 NG/ML
CLONAZEPAM UR CFM-MCNC: <25 NG/ML
CODEINE UR CFM-MCNC: <50 NG/ML
DIAZEPAM UR CFM-MCNC: <25 NG/ML
EDDP UR CFM-MCNC: <25 NG/ML
FENTANYL UR CFM-MCNC: <2.5 NG/ML
HYDROCODONE CTO UR CFM-MCNC: <25 NG/ML
HYDROMORPHONE UR CFM-MCNC: <25 NG/ML
LORAZEPAM UR CFM-MCNC: <25 NG/ML
METHADONE UR CFM-MCNC: <25 NG/ML
MIDAZOLAM UR CFM-MCNC: <25 NG/ML
MORPHINE UR CFM-MCNC: <50 NG/ML
NORDIAZEPAM UR CFM-MCNC: <25 NG/ML
NORFENTANYL UR CFM-MCNC: <2.5 NG/ML
NORHYDROCODONE UR CFM-MCNC: <25 NG/ML
NOROXYCODONE UR CFM-MCNC: >1000 NG/ML
NORTRAMADOL UR-MCNC: <50 NG/ML
OXAZEPAM UR CFM-MCNC: <25 NG/ML
OXYCODONE UR CFM-MCNC: 57 NG/ML
OXYMORPHONE UR CFM-MCNC: 94 NG/ML
TEMAZEPAM UR CFM-MCNC: <25 NG/ML
TRAMADOL UR CFM-MCNC: <50 NG/ML
ZOLPIDEM UR CFM-MCNC: <25 NG/ML
ZOLPIDEM UR-MCNC: <25 NG/ML

## 2024-06-26 ENCOUNTER — NURSE TRIAGE (OUTPATIENT)
Dept: HEMATOLOGY/ONCOLOGY | Facility: HOSPITAL | Age: 69
End: 2024-06-26
Payer: MEDICARE

## 2024-06-26 NOTE — TELEPHONE ENCOUNTER
Per Acosta Loredo, there is low suspicion that this has to do with her MGUS as there has not been any change in her lab work over the past two years.  Offered a referral to another  dermatologist and pt states she has another derm lined up and will discuss at her next follow up in September if referral is needed.

## 2024-06-26 NOTE — TELEPHONE ENCOUNTER
Pt called regarding itching of chest, stomach, hips and buttocks that she has had for ~ 3 years. There is no rash present.  She has seen multiple dermatologists, tried topical steroids, antihistamines and had a biopsy and has not had resolution of the itching.  The dermatologist she saw yesterday recommended she discuss with [facility].  Additional Information   Commented on: Where is the itching?     Hips, buttocks   Commented on: How long have they been going on?     Started 3 years ago   Commented on: Are you having any pain? Where? On a scale or 0 - 10 with 0 being no pain and 10 being the worst pain ever, how would you rate your pain?     Pt has chronic pain, nothing new    Protocols used: Pruritis/Itching

## 2024-07-09 ENCOUNTER — APPOINTMENT (OUTPATIENT)
Dept: PAIN MEDICINE | Facility: CLINIC | Age: 69
End: 2024-07-09
Payer: MEDICARE

## 2024-07-09 DIAGNOSIS — S33.5XXA LUMBAR SPRAIN, INITIAL ENCOUNTER: ICD-10-CM

## 2024-07-09 DIAGNOSIS — S43.402A SPRAIN OF LEFT SHOULDER, UNSPECIFIED SHOULDER SPRAIN TYPE, INITIAL ENCOUNTER: Primary | ICD-10-CM

## 2024-07-09 DIAGNOSIS — S80.02XA CONTUSION OF LEFT KNEE, INITIAL ENCOUNTER: ICD-10-CM

## 2024-07-09 DIAGNOSIS — S51.011A LACERATION OF RIGHT ELBOW, INITIAL ENCOUNTER: ICD-10-CM

## 2024-07-09 PROCEDURE — 99214 OFFICE O/P EST MOD 30 MIN: CPT | Performed by: PHYSICAL MEDICINE & REHABILITATION

## 2024-07-09 RX ORDER — OXYCODONE AND ACETAMINOPHEN 7.5; 325 MG/1; MG/1
1 TABLET ORAL EVERY 8 HOURS PRN
Qty: 84 TABLET | Refills: 0 | Status: SHIPPED | OUTPATIENT
Start: 2024-07-12 | End: 2024-08-09

## 2024-07-09 RX ORDER — LUBIPROSTONE 24 UG/1
24 CAPSULE ORAL
Qty: 60 CAPSULE | Refills: 1 | Status: SHIPPED | OUTPATIENT
Start: 2024-07-09 | End: 2024-08-08

## 2024-07-09 RX ORDER — TIZANIDINE 2 MG/1
2-4 TABLET ORAL NIGHTLY
Qty: 84 TABLET | Refills: 1 | Status: SHIPPED | OUTPATIENT
Start: 2024-07-09 | End: 2024-08-06

## 2024-07-09 RX ORDER — OXYCODONE AND ACETAMINOPHEN 7.5; 325 MG/1; MG/1
1 TABLET ORAL EVERY 8 HOURS PRN
Qty: 84 TABLET | Refills: 0 | Status: SHIPPED | OUTPATIENT
Start: 2024-08-09 | End: 2024-09-06

## 2024-07-09 RX ORDER — TRAZODONE HYDROCHLORIDE 50 MG/1
50 TABLET ORAL NIGHTLY
Qty: 30 TABLET | Refills: 1 | Status: SHIPPED | OUTPATIENT
Start: 2024-07-09 | End: 2024-08-08

## 2024-07-09 NOTE — PROGRESS NOTES
DOL claim [id number]  DOI 08/30/2019       · Contusion of knee, left  (S80.02XA)   · Elbow laceration, right, initial encounter  (S51.011A)   · Sprain ligaments of lumbar spine S33.5XXA   . Unsp Sprain left shoulder joint, initial encounter S43.402A    Chief complaint  Back pain , shoulder and knee    History  Sonam Saab is back for pain management office visit  [facility] with back pain. She had injury in 2001 and aggravation  in 2019  She was seen for [facility] by dr Segovia and the DOL is asking the examiner for explanation    She has the pain in the back and knee  The pain in the back is deep on both sides.  The pain is above the belt line, it is continuous but it gets worse with extension of the lumbar spine to either side. The pain improves with leaning forward.  Extension combined with rotation to the either side worsens the pain.  There are no reported radiation of the pain to the lower limbs.      Intermittent radiation of the pain to the leg    Pain level without medication is 8/10 , with the medication pain level 3 to 4 /10.     The pain meds are helping control the pain and improving Activities of Daily living and quality of life and quality of sleep.    opioids treatment agreement Jan 2024     Oarrs pulled and reviewed, no concerns  last urine toxicology testing earlier this year and it was compliant we will repeat  Xray updated spine   ORT Score is  0  Pain pathology and pain generators spine and joints   Modalities tried injection, surgery, physical therapy, TENS unit, nonsteroidal anti-inflammatory medication       Review of Systems :  Denied any fever or chills. No weight loss and no night sweats. No cough or sputum production. No diarrhea   The constipation has been responding to fibers and over the counter medications.     No bladder and bowel incontinence and no other changes in bladder and bowel. No skin changes.  Reports tiredness and fatigability only if the pain is not controlled.   Denied opioids  "diversion and abuse and denies alcoholism. Denies overuse of the pain medications.  No reported euphoria sensation or getting a \"high\" on the pain medications.    The control of the pain with the pain medications is helping the control of the symptoms and allowing the function and activities of daily living, enjoyment of life, improving the quality of life and sleep with less interruption by the pain. The goal is symptomatic control of the nonmalignant chronic pain and not to repair the permanent damage in the tissues inducing the chronic pain conditions. We are aiming to shift the focus from the nonmalignant chronic pain to other aspects of life by symptomatically treating this chronic pain. If this pain is not treated it will lead to major morbidity and it is also associated with increased risks of mortality. The patient understands those very clearly and also understand high risks of morbidity and mortality if not strictly adherent to the treatment recommendations and reporting any associated side effects. Also patient understand the full responsibility associated with these medications to avoid abuse or overuse or any use of these medications for anything besides treating the patient's own chronic pain and nothing else under any circumstances.        Physical examination  Awake, alert and oriented for time place and persons   declined Chaperone for the visit and was adequately  draped for the exam.    Examination of the lumbar spine showed tight muscle bands in the mid and lower back area, this is more pronounced on the right compared to the left.  Additionally, this is inducing mild reversal of the lumbar lordosis with functional scoliosis with right-sided concavity.  This scoliosis corrected with  bending of the lumbar spine.     Straight leg raising increased the pain in the back and down the lateral aspect of the right knee onto the lateral leg to the lateral malleolus and foot.     There is decreased " sensory to light touch on the lateral aspect of the thigh and around the right knee going down to the lateral aspect of the leg to the right lateral malleolus into the dorsum of the foot..    Deep tendon reflexes is present for the patellar tendons bilaterally.  Achilles reflexes are present bilaterally and symmetric.    Medial Hamstrings reflex is decreased on the right compared to the left side.  Plantar cutaneous are downgoing.  Ankle dorsiflexion is 5/5 bilaterally.  Plantar flexion of the ankles are 5/5 bilaterally.  Big toe extension is 4/5 on the right compared to 5/5 on the left side.    Negative Tinel's sign over the right peroneal nerve at the fibular neck.  Dangelo sign for axial loading and global rotation are negative.  No aberrant pain behavior.       Diagnosis  Problem List Items Addressed This Visit       Contusion of left knee    Relevant Medications    oxyCODONE-acetaminophen (Percocet) 7.5-325 mg tablet (Start on 7/12/2024)    oxyCODONE-acetaminophen (Percocet) 7.5-325 mg tablet (Start on 8/9/2024)    tiZANidine (Zanaflex) 2 mg tablet    traZODone (Desyrel) 50 mg tablet    lubiprostone (Amitiza) 24 mcg capsule    Laceration of right elbow    Relevant Medications    oxyCODONE-acetaminophen (Percocet) 7.5-325 mg tablet (Start on 7/12/2024)    oxyCODONE-acetaminophen (Percocet) 7.5-325 mg tablet (Start on 8/9/2024)    tiZANidine (Zanaflex) 2 mg tablet    traZODone (Desyrel) 50 mg tablet    lubiprostone (Amitiza) 24 mcg capsule    Lumbar sprain    Relevant Medications    oxyCODONE-acetaminophen (Percocet) 7.5-325 mg tablet (Start on 7/12/2024)    oxyCODONE-acetaminophen (Percocet) 7.5-325 mg tablet (Start on 8/9/2024)    tiZANidine (Zanaflex) 2 mg tablet    traZODone (Desyrel) 50 mg tablet    lubiprostone (Amitiza) 24 mcg capsule    Sprain of shoulder, left - Primary    Relevant Medications    oxyCODONE-acetaminophen (Percocet) 7.5-325 mg tablet (Start on 7/12/2024)    oxyCODONE-acetaminophen  (Percocet) 7.5-325 mg tablet (Start on 8/9/2024)    tiZANidine (Zanaflex) 2 mg tablet    traZODone (Desyrel) 50 mg tablet    lubiprostone (Amitiza) 24 mcg capsule        Plan  Reviewed the pain generators.  Went over the types of pain with neuropathic and nociceptive and different pathologies and therapeutic modalities. Discussed the mechanism of action of interventions from acupuncture, physical therapy , regular exercises, injections, botox, spinal cord stimulation, and role of surgery     Went over pathology of the intervertebral disc displacement and the anatomical relation to the Nerve roots and relation to the radicular symptoms. Went over treatment modalities with conservative treatment including acupuncture   and epidural steroid injection with fluoroscopy guidance and last resort of surgery    Based on the above findings and the clinical response to the opioids medications and improvement of the activities of daily living, sleep, and work performance. We made this complex decision to continue the opioids therapy in light of the evidence of the patient's responsibility in using the pain medications as prescribed for the nonmalignant chronic pain condition. We discussed about the use of the pain medications to treat the symptoms of chronic nonmalignant pain and we are not trying the repair the permanent damage in the tissues, rather we are trying to control the symptoms induced by the permanent damage to the tissues inducing the chronic pain condition and resulting disability. I explained the difference and discussed it with the patient and stressed the importance of knowing the difference especially because of the potential side effects and the potential addicting effect and habit forming nature of the dangerous drugs we are using to treat the symptoms of the chronic pain.      We discussed that we are prescribing the medications on good quinn and legitimate medical reason.     We reviewed the side effects  and precautions of opioids prescriptions as discussed in the opioids treatment agreement.    realizes the interaction between the therapeutic classes including the respiratory depression and potential death     Random drug testing   we will submit     Continue with pain meds  Consider injection for aggravation   Will wait on response for explanation from [facility]    Discussed about NSAIDS and I explained about the opioids sparing effect to allow keeping the opioids dose at minimal effective dose.   I went over the potential side effects of the NSAIDS on the gastrointestinal, renal and cardiovascular systems.      I detailed the side effects from the acetaminophen in the medication and made aware of those. I also explained about the cumulative effects on the organs and mainly the liver.     Given the opioids therapy , we discussed about the risk for accidental over dose on the pain medications, either for patient or other household. I went over the mechanism of action and mode of use of the Naloxone according to the  recommendations. I will provide a prescription for a kit.     Follow-up 8 weeks or earlier if needed     The level of clinical decision making in this office visit,  is high, given the high risks of complications with the morbidity and mortality due to the fact that acute and chronic pain may pose a threat to life and bodily function, if under treated, poorly treated, or with failure to maintain adequate treatment and timely medical follow up. Additionally over treatment has its own set of complications including overdosing on the pain medications and also the habit forming potentials with the use of the medications used to treat chronic painful conditions including therapeutic classes classified as dangerous medications. Given the serious and fluctuating nature of pain level and instensity with extensive consideration for whenever pain changes, there is always the risk of prolonged functional  impairment requiring close patient monitoring with regular assessments and reassessments and high level medical decision making at every office visit. The amount and complexity of data reviewed is high given the patient clinical presentation, labs,  data, radiology reports, and other tests as discussed during office visits. Pertinent data whether positive or negative were taken in consideration in the process of making this high level medical decision.

## 2024-07-16 ENCOUNTER — LAB (OUTPATIENT)
Dept: LAB | Facility: LAB | Age: 69
End: 2024-07-16
Payer: MEDICARE

## 2024-07-16 DIAGNOSIS — L98.9 DISORDER OF THE SKIN AND SUBCUTANEOUS TISSUE, UNSPECIFIED: Primary | ICD-10-CM

## 2024-07-16 LAB
HERPES SIMPLEX VIRUS 1 IGG: 4.7 INDEX
HERPES SIMPLEX VIRUS 2 IGG: 6.4 INDEX
RHEUMATOID FACT SER NEPH-ACNC: <10 IU/ML (ref 0–15)

## 2024-07-16 PROCEDURE — 86695 HERPES SIMPLEX TYPE 1 TEST: CPT

## 2024-07-16 PROCEDURE — 86235 NUCLEAR ANTIGEN ANTIBODY: CPT

## 2024-07-16 PROCEDURE — 86038 ANTINUCLEAR ANTIBODIES: CPT

## 2024-07-16 PROCEDURE — 86696 HERPES SIMPLEX TYPE 2 TEST: CPT

## 2024-07-16 PROCEDURE — 86431 RHEUMATOID FACTOR QUANT: CPT

## 2024-07-16 PROCEDURE — 36415 COLL VENOUS BLD VENIPUNCTURE: CPT

## 2024-07-16 PROCEDURE — 86318 IA INFECTIOUS AGENT ANTIBODY: CPT

## 2024-07-16 PROCEDURE — 86225 DNA ANTIBODY NATIVE: CPT

## 2024-07-17 ENCOUNTER — LAB REQUISITION (OUTPATIENT)
Dept: LAB | Facility: HOSPITAL | Age: 69
End: 2024-07-17
Payer: MEDICARE

## 2024-07-17 DIAGNOSIS — L98.9 DISORDER OF THE SKIN AND SUBCUTANEOUS TISSUE, UNSPECIFIED: ICD-10-CM

## 2024-07-17 LAB — RPR SER QL: NONREACTIVE

## 2024-07-18 LAB
ANA PATTERN: ABNORMAL
ANA SER QL HEP2 SUBST: POSITIVE
ANA TITR SER IF: ABNORMAL {TITER}
CENTROMERE B AB SER-ACNC: <0.2 AI
CHROMATIN AB SERPL-ACNC: <0.2 AI
DSDNA AB SER-ACNC: <1 IU/ML
ENA JO1 AB SER QL IA: <0.2 AI
ENA RNP AB SER IA-ACNC: <0.2 AI
ENA SCL70 AB SER QL IA: <0.2 AI
ENA SM AB SER IA-ACNC: <0.2 AI
ENA SM+RNP AB SER QL IA: <0.2 AI
ENA SS-A AB SER IA-ACNC: <0.2 AI
ENA SS-B AB SER IA-ACNC: <0.2 AI
RIBOSOMAL P AB SER-ACNC: <0.2 AI

## 2024-09-03 ENCOUNTER — APPOINTMENT (OUTPATIENT)
Dept: PAIN MEDICINE | Facility: CLINIC | Age: 69
End: 2024-09-03
Payer: MEDICARE

## 2024-09-03 DIAGNOSIS — S43.402A SPRAIN OF LEFT SHOULDER, UNSPECIFIED SHOULDER SPRAIN TYPE, INITIAL ENCOUNTER: ICD-10-CM

## 2024-09-03 DIAGNOSIS — S33.5XXA LUMBAR SPRAIN, INITIAL ENCOUNTER: Primary | ICD-10-CM

## 2024-09-03 DIAGNOSIS — S51.011A LACERATION OF RIGHT ELBOW, INITIAL ENCOUNTER: ICD-10-CM

## 2024-09-03 DIAGNOSIS — S80.02XA CONTUSION OF LEFT KNEE, INITIAL ENCOUNTER: ICD-10-CM

## 2024-09-03 RX ORDER — OXYCODONE AND ACETAMINOPHEN 7.5; 325 MG/1; MG/1
1 TABLET ORAL EVERY 8 HOURS PRN
Qty: 84 TABLET | Refills: 0 | Status: SHIPPED | OUTPATIENT
Start: 2024-09-04 | End: 2024-10-02

## 2024-09-03 RX ORDER — TIZANIDINE 2 MG/1
2-4 TABLET ORAL NIGHTLY
Qty: 84 TABLET | Refills: 1 | Status: SHIPPED | OUTPATIENT
Start: 2024-09-03 | End: 2024-10-01

## 2024-09-03 RX ORDER — OXYCODONE AND ACETAMINOPHEN 7.5; 325 MG/1; MG/1
1 TABLET ORAL EVERY 8 HOURS PRN
Qty: 84 TABLET | Refills: 0 | Status: SHIPPED | OUTPATIENT
Start: 2024-10-02 | End: 2024-10-30

## 2024-09-03 RX ORDER — TRAZODONE HYDROCHLORIDE 50 MG/1
50 TABLET ORAL NIGHTLY
Qty: 30 TABLET | Refills: 1 | Status: SHIPPED | OUTPATIENT
Start: 2024-09-03 | End: 2024-10-03

## 2024-09-03 NOTE — PROGRESS NOTES
DOL claim [id number]  DOI 08/30/2019       · Contusion of knee, left  (S80.02XA)   · Elbow laceration, right, initial encounter  (S51.011A)   · Sprain ligaments of lumbar spine S33.5XXA   . Unsp Sprain left shoulder joint, initial encounter S43.402A      Chief complaint  Back pain     History  Sonam Saab is back for pain management office visit  Continue with back pain  \she fell again and aggravated the pain in the back and leg  Pain in the back and right leg lately  The pain in the back is deep achy worse in the mid back area.  This is associated with tight muscle bands.  This limits the range of motion of the lumbar spine mainly in forward flexion.  The pain in the back is radiating around the side on the lateral aspect of the right thigh going down toward the lateral aspect of the right leg into the lateral malleolus toward the lateral aspect of the foot towards the big toe.    This pain down to the right lower limb is more of a burning tingling sensation.  The pain in the right lower limb worsens with bending forward or with any lifting, it improves with laying on the side and resting.  This is similar to the associated pain in the middle to lower back.  Denied any bowel or bladder incontinence.  With the worst pain there is tendency to catch the toe especially on carpeted area.  This occurs mostly when tired and toward the end of the day.    The skin is intact with no breakdown.  No vesicles.     Pain level without medication is 7/10 , with the medication pain level 2 to 3/10.     The pain meds are helping control the pain and improving Activities of Daily living and quality of life and quality of sleep.    opioids treatment agreement Jan 2024  Pill count today, using count tray, and in front of patient :  1    pills , last fill was on 8/9 (on bottle)  for 84 tabs,  the count is correct  Oarrs pulled and reviewed, no concerns  last urine toxicology testing earlier this year and it was compliant we will  "repeat  Xray updated spine   ORT Score is   0  Pain pathology and pain generators spine   Modalities tried injection, surgery, physical therapy, TENS unit, nonsteroidal anti-inflammatory medication 2     Review of Systems :  Denied any fever or chills. No weight loss and no night sweats. No cough or sputum production. No diarrhea   The constipation has been responding to fibers and over the counter medications.     No bladder and bowel incontinence and no other changes in bladder and bowel. No skin changes.  Reports tiredness and fatigability only if the pain is not controlled.     Denied opioids diversion and abuse and denies alcoholism. Denies overuse of the pain medications.  No reported euphoria sensation or getting a \"high\" on the pain medications.    The control of the pain with the pain medications is helping the control of the symptoms and allowing the function and activities of daily living, enjoyment of life, improving the quality of life and sleep with less interruption by the pain. The goal is symptomatic control of the nonmalignant chronic pain and not to repair the permanent damage in the tissues inducing the chronic pain conditions. We are aiming to shift the focus from the nonmalignant chronic pain to other aspects of life by symptomatically treating this chronic pain. If this pain is not treated it will lead to major morbidity and it is also associated with increased risks of mortality. The patient understands those very clearly and also understand high risks of morbidity and mortality if not strictly adherent to the treatment recommendations and reporting any associated side effects. Also patient understand the full responsibility associated with these medications to avoid abuse or overuse or any use of these medications for anything besides treating the patient's own chronic pain and nothing else under any circumstances.        Physical examination  Awake, alert and oriented for time place and " persons   declined Chaperone for the visit and was adequately  draped for the exam.    Examination of the lumbar spine showed tight muscle bands in the mid and lower back area, this is more pronounced on the right compared to the left.  Additionally, this is inducing mild reversal of the lumbar lordosis with functional scoliosis with right-sided concavity.  This scoliosis corrected with  bending of the lumbar spine.     Straight leg raising increased the pain in the back and down the lateral aspect of the right knee onto the lateral leg to the lateral malleolus and foot.     There is decreased sensory to light touch on the lateral aspect of the thigh and around the right knee going down to the lateral aspect of the leg to the right lateral malleolus into the dorsum of the foot..    Deep tendon reflexes is present for the patellar tendons bilaterally.  Achilles reflexes are present bilaterally and symmetric.    Medial Hamstrings reflex is decreased on the right compared to the left side.  Plantar cutaneous are downgoing.  Ankle dorsiflexion is 5/5 bilaterally.  Plantar flexion of the ankles are 5/5 bilaterally.  Big toe extension is 4/5 on the right compared to 5/5 on the left side.    Negative Tinel's sign over the right peroneal nerve at the fibular neck.  Dangelo sign for axial loading and global rotation are negative.  No aberrant pain behavior.           Diagnosis  Problem List Items Addressed This Visit       Contusion of left knee    Relevant Medications    oxyCODONE-acetaminophen (Percocet) 7.5-325 mg tablet (Start on 9/4/2024)    oxyCODONE-acetaminophen (Percocet) 7.5-325 mg tablet (Start on 10/2/2024)    tiZANidine (Zanaflex) 2 mg tablet    traZODone (Desyrel) 50 mg tablet    Laceration of right elbow    Relevant Medications    oxyCODONE-acetaminophen (Percocet) 7.5-325 mg tablet (Start on 9/4/2024)    oxyCODONE-acetaminophen (Percocet) 7.5-325 mg tablet (Start on 10/2/2024)    tiZANidine (Zanaflex) 2 mg  tablet    traZODone (Desyrel) 50 mg tablet    Lumbar sprain - Primary    Relevant Medications    oxyCODONE-acetaminophen (Percocet) 7.5-325 mg tablet (Start on 9/4/2024)    oxyCODONE-acetaminophen (Percocet) 7.5-325 mg tablet (Start on 10/2/2024)    tiZANidine (Zanaflex) 2 mg tablet    traZODone (Desyrel) 50 mg tablet    Sprain of shoulder, left    Relevant Medications    oxyCODONE-acetaminophen (Percocet) 7.5-325 mg tablet (Start on 9/4/2024)    oxyCODONE-acetaminophen (Percocet) 7.5-325 mg tablet (Start on 10/2/2024)    tiZANidine (Zanaflex) 2 mg tablet    traZODone (Desyrel) 50 mg tablet        Plan  Reviewed the pain generators.  Went over the types of pain with neuropathic and nociceptive and different pathologies and therapeutic modalities. Discussed the mechanism of action of interventions from acupuncture, physical therapy , regular exercises, injections, botox, spinal cord stimulation, and role of surgery     Went over pathology of the intervertebral disc displacement and the anatomical relation to the Nerve roots and relation to the radicular symptoms. Went over treatment modalities with conservative treatment including acupuncture   and epidural steroid injection with fluoroscopy guidance and last resort of surgery    Based on the above findings and the clinical response to the opioids medications and improvement of the activities of daily living, sleep, and work performance. We made this complex decision to continue the opioids therapy in light of the evidence of the patient's responsibility in using the pain medications as prescribed for the nonmalignant chronic pain condition. We discussed about the use of the pain medications to treat the symptoms of chronic nonmalignant pain and we are not trying the repair the permanent damage in the tissues, rather we are trying to control the symptoms induced by the permanent damage to the tissues inducing the chronic pain condition and resulting disability. I  explained the difference and discussed it with the patient and stressed the importance of knowing the difference especially because of the potential side effects and the potential addicting effect and habit forming nature of the dangerous drugs we are using to treat the symptoms of the chronic pain.      We discussed that we are prescribing the medications on good quinn and legitimate medical reason.     We reviewed the side effects and precautions of opioids prescriptions as discussed in the opioids treatment agreement.    realizes the interaction between the therapeutic classes including the respiratory depression and potential death     Random drug testing   we will submit     Consider AUBREE if aggravation  does not improve  Long discussion about putting effort in cutting back on pain medications. Discussed about pain level changing and we do not know if the medications at this amount are still needed until we try and cut back slowly on pain medications. If the cut is tolerated then we continue. If cut not tolerated then, will go back on the pain medications level.  The goal from this is to keep the pain medications at the lowest effective dose.     Discussed about NSAIDS and I explained about the opioids sparing effect to allow keeping the opioids dose at minimal effective dose.   I went over the potential side effects of the NSAIDS on the gastrointestinal, renal and cardiovascular systems.      I detailed the side effects from the acetaminophen in the medication and made aware of those. I also explained about the cumulative effects on the organs and mainly the liver.     Given the opioids therapy , we discussed about the risk for accidental over dose on the pain medications, either for patient or other household. I went over the mechanism of action and mode of use of the Naloxone according to the  recommendations. I will provide a prescription for a kit.     Follow-up 8 weeks or earlier if needed      The level of clinical decision making in this office visit,  is high, given the high risks of complications with the morbidity and mortality due to the fact that acute and chronic pain may pose a threat to life and bodily function, if under treated, poorly treated, or with failure to maintain adequate treatment and timely medical follow up. Additionally over treatment has its own set of complications including overdosing on the pain medications and also the habit forming potentials with the use of the medications used to treat chronic painful conditions including therapeutic classes classified as dangerous medications. Given the serious and fluctuating nature of pain level and instensity with extensive consideration for whenever pain changes, there is always the risk of prolonged functional impairment requiring close patient monitoring with regular assessments and reassessments and high level medical decision making at every office visit. The amount and complexity of data reviewed is high given the patient clinical presentation, labs,  data, radiology reports, and other tests as discussed during office visits. Pertinent data whether positive or negative were taken in consideration in the process of making this high level medical decision.

## 2024-09-10 ENCOUNTER — APPOINTMENT (OUTPATIENT)
Dept: HEMATOLOGY/ONCOLOGY | Facility: HOSPITAL | Age: 69
End: 2024-09-10
Payer: COMMERCIAL

## 2024-09-12 ENCOUNTER — APPOINTMENT (OUTPATIENT)
Dept: HEMATOLOGY/ONCOLOGY | Facility: HOSPITAL | Age: 69
End: 2024-09-12
Payer: MEDICARE

## 2024-09-20 ENCOUNTER — APPOINTMENT (OUTPATIENT)
Dept: HEMATOLOGY/ONCOLOGY | Facility: HOSPITAL | Age: 69
End: 2024-09-20
Payer: MEDICARE

## 2024-09-20 ENCOUNTER — APPOINTMENT (OUTPATIENT)
Dept: PHYSICAL THERAPY | Facility: CLINIC | Age: 69
End: 2024-09-20
Payer: MEDICARE

## 2024-10-02 ENCOUNTER — DOCUMENTATION (OUTPATIENT)
Dept: PAIN MEDICINE | Facility: CLINIC | Age: 69
End: 2024-10-02
Payer: MEDICARE

## 2024-10-02 NOTE — PROGRESS NOTES
DOL claim [id number]  DOI 08/30/2019       · Contusion of knee, left  (S80.02XA)   · Elbow laceration, right, initial encounter  (S51.011A)   · Sprain ligaments of lumbar spine S33.5XXA   . Unsp Sprain left shoulder joint, initial encounter S43.402A      I am treating Sonam Saab for the above condition on the claim above.  She tried multiple treatment modalities including injection and therapy however she continues to be symptomatic.  We are treating her with adjuvant medication as well as pain medication with oxycodone.  She is currently on oxycodone 7.5 mg 3 times a day to help controlling the pain.    With the pain control she is able to function, does activities of daily living and improve her quality of life and quality of sleep.    I am writing this letter to request that  allow Promise Saab to have the medication to help with her symptoms in relation to the allowed condition on the claim    Respectfully submitted        Guanakito Gil MD

## 2024-10-03 DIAGNOSIS — S80.02XA CONTUSION OF LEFT KNEE, INITIAL ENCOUNTER: ICD-10-CM

## 2024-10-03 DIAGNOSIS — S33.5XXA LUMBAR SPRAIN, INITIAL ENCOUNTER: ICD-10-CM

## 2024-10-03 DIAGNOSIS — S51.011A LACERATION OF RIGHT ELBOW, INITIAL ENCOUNTER: ICD-10-CM

## 2024-10-03 DIAGNOSIS — S43.402A SPRAIN OF LEFT SHOULDER, UNSPECIFIED SHOULDER SPRAIN TYPE, INITIAL ENCOUNTER: ICD-10-CM

## 2024-10-03 RX ORDER — LUBIPROSTONE 24 UG/1
24 CAPSULE ORAL
Qty: 60 CAPSULE | Refills: 1 | Status: SHIPPED | OUTPATIENT
Start: 2024-10-03 | End: 2024-11-02

## 2024-10-04 ENCOUNTER — APPOINTMENT (OUTPATIENT)
Dept: HEMATOLOGY/ONCOLOGY | Facility: HOSPITAL | Age: 69
End: 2024-10-04
Payer: MEDICARE

## 2024-10-07 ENCOUNTER — APPOINTMENT (OUTPATIENT)
Dept: PHYSICAL THERAPY | Facility: CLINIC | Age: 69
End: 2024-10-07
Payer: MEDICARE

## 2024-10-11 ENCOUNTER — APPOINTMENT (OUTPATIENT)
Dept: HEMATOLOGY/ONCOLOGY | Facility: HOSPITAL | Age: 69
End: 2024-10-11
Payer: MEDICARE

## 2024-10-18 ENCOUNTER — APPOINTMENT (OUTPATIENT)
Dept: HEMATOLOGY/ONCOLOGY | Facility: HOSPITAL | Age: 69
End: 2024-10-18
Payer: MEDICARE

## 2024-10-18 DIAGNOSIS — D47.2 MGUS (MONOCLONAL GAMMOPATHY OF UNKNOWN SIGNIFICANCE): ICD-10-CM

## 2024-10-22 ENCOUNTER — APPOINTMENT (OUTPATIENT)
Dept: PAIN MEDICINE | Facility: CLINIC | Age: 69
End: 2024-10-22
Payer: MEDICARE

## 2024-10-28 ENCOUNTER — APPOINTMENT (OUTPATIENT)
Dept: PAIN MEDICINE | Facility: CLINIC | Age: 69
End: 2024-10-28
Payer: MEDICARE

## 2024-10-28 DIAGNOSIS — Z79.891 LONG TERM CURRENT USE OF OPIATE ANALGESIC: Primary | ICD-10-CM

## 2024-10-28 DIAGNOSIS — S80.02XA CONTUSION OF LEFT KNEE, INITIAL ENCOUNTER: ICD-10-CM

## 2024-10-28 DIAGNOSIS — S33.5XXA LUMBAR SPRAIN, INITIAL ENCOUNTER: ICD-10-CM

## 2024-10-28 DIAGNOSIS — S51.011A LACERATION OF RIGHT ELBOW, INITIAL ENCOUNTER: ICD-10-CM

## 2024-10-28 DIAGNOSIS — S43.402A SPRAIN OF LEFT SHOULDER, UNSPECIFIED SHOULDER SPRAIN TYPE, INITIAL ENCOUNTER: ICD-10-CM

## 2024-10-28 PROCEDURE — 99214 OFFICE O/P EST MOD 30 MIN: CPT | Performed by: PHYSICAL MEDICINE & REHABILITATION

## 2024-10-28 RX ORDER — OXYCODONE AND ACETAMINOPHEN 7.5; 325 MG/1; MG/1
1 TABLET ORAL EVERY 8 HOURS PRN
Qty: 84 TABLET | Refills: 0 | Status: SHIPPED | OUTPATIENT
Start: 2024-11-08 | End: 2024-12-06

## 2024-10-28 RX ORDER — OXYCODONE AND ACETAMINOPHEN 7.5; 325 MG/1; MG/1
1 TABLET ORAL EVERY 8 HOURS PRN
Qty: 84 TABLET | Refills: 0 | Status: SHIPPED | OUTPATIENT
Start: 2024-12-06 | End: 2025-01-03

## 2024-11-06 ENCOUNTER — LAB (OUTPATIENT)
Dept: LAB | Facility: LAB | Age: 69
End: 2024-11-06
Payer: MEDICARE

## 2024-11-06 ENCOUNTER — TELEPHONE (OUTPATIENT)
Dept: HEMATOLOGY/ONCOLOGY | Facility: HOSPITAL | Age: 69
End: 2024-11-06

## 2024-11-06 DIAGNOSIS — D47.2 MGUS (MONOCLONAL GAMMOPATHY OF UNKNOWN SIGNIFICANCE): ICD-10-CM

## 2024-11-06 LAB
ALBUMIN SERPL BCP-MCNC: 3.5 G/DL (ref 3.4–5)
ALP SERPL-CCNC: 90 U/L (ref 33–136)
ALT SERPL W P-5'-P-CCNC: 10 U/L (ref 7–45)
ANION GAP SERPL CALC-SCNC: 12 MMOL/L (ref 10–20)
AST SERPL W P-5'-P-CCNC: 12 U/L (ref 9–39)
BASOPHILS # BLD AUTO: 0.02 X10*3/UL (ref 0–0.1)
BASOPHILS NFR BLD AUTO: 0.4 %
BILIRUB SERPL-MCNC: 0.6 MG/DL (ref 0–1.2)
BUN SERPL-MCNC: 16 MG/DL (ref 6–23)
CALCIUM SERPL-MCNC: 9.3 MG/DL (ref 8.6–10.6)
CHLORIDE SERPL-SCNC: 101 MMOL/L (ref 98–107)
CO2 SERPL-SCNC: 31 MMOL/L (ref 21–32)
CREAT SERPL-MCNC: 0.89 MG/DL (ref 0.5–1.05)
EGFRCR SERPLBLD CKD-EPI 2021: 71 ML/MIN/1.73M*2
EOSINOPHIL # BLD AUTO: 0.15 X10*3/UL (ref 0–0.7)
EOSINOPHIL NFR BLD AUTO: 2.9 %
ERYTHROCYTE [DISTWIDTH] IN BLOOD BY AUTOMATED COUNT: 13 % (ref 11.5–14.5)
GLUCOSE SERPL-MCNC: 100 MG/DL (ref 74–99)
HCT VFR BLD AUTO: 42.1 % (ref 36–46)
HGB BLD-MCNC: 13.7 G/DL (ref 12–16)
IGA SERPL-MCNC: 286 MG/DL (ref 70–400)
IGG SERPL-MCNC: 844 MG/DL (ref 700–1600)
IGM SERPL-MCNC: 55 MG/DL (ref 40–230)
IMM GRANULOCYTES # BLD AUTO: 0.02 X10*3/UL (ref 0–0.7)
IMM GRANULOCYTES NFR BLD AUTO: 0.4 % (ref 0–0.9)
LYMPHOCYTES # BLD AUTO: 1.67 X10*3/UL (ref 1.2–4.8)
LYMPHOCYTES NFR BLD AUTO: 32.1 %
MCH RBC QN AUTO: 29.7 PG (ref 26–34)
MCHC RBC AUTO-ENTMCNC: 32.5 G/DL (ref 32–36)
MCV RBC AUTO: 91 FL (ref 80–100)
MONOCYTES # BLD AUTO: 0.43 X10*3/UL (ref 0.1–1)
MONOCYTES NFR BLD AUTO: 8.3 %
NEUTROPHILS # BLD AUTO: 2.92 X10*3/UL (ref 1.2–7.7)
NEUTROPHILS NFR BLD AUTO: 55.9 %
NRBC BLD-RTO: 0 /100 WBCS (ref 0–0)
PLATELET # BLD AUTO: 219 X10*3/UL (ref 150–450)
POTASSIUM SERPL-SCNC: 3.6 MMOL/L (ref 3.5–5.3)
PROT SERPL-MCNC: 6.2 G/DL (ref 6.4–8.2)
PROT SERPL-MCNC: 6.2 G/DL (ref 6.4–8.2)
RBC # BLD AUTO: 4.62 X10*6/UL (ref 4–5.2)
SODIUM SERPL-SCNC: 140 MMOL/L (ref 136–145)
WBC # BLD AUTO: 5.2 X10*3/UL (ref 4.4–11.3)

## 2024-11-06 PROCEDURE — 84155 ASSAY OF PROTEIN SERUM: CPT

## 2024-11-06 PROCEDURE — 36415 COLL VENOUS BLD VENIPUNCTURE: CPT

## 2024-11-06 PROCEDURE — 86320 SERUM IMMUNOELECTROPHORESIS: CPT

## 2024-11-06 PROCEDURE — 84165 PROTEIN E-PHORESIS SERUM: CPT

## 2024-11-06 PROCEDURE — 82784 ASSAY IGA/IGD/IGG/IGM EACH: CPT

## 2024-11-06 PROCEDURE — 80053 COMPREHEN METABOLIC PANEL: CPT

## 2024-11-06 PROCEDURE — 83521 IG LIGHT CHAINS FREE EACH: CPT

## 2024-11-06 PROCEDURE — 86334 IMMUNOFIX E-PHORESIS SERUM: CPT

## 2024-11-06 PROCEDURE — 85025 COMPLETE CBC W/AUTO DIFF WBC: CPT

## 2024-11-07 LAB
KAPPA LC SERPL-MCNC: 3.12 MG/DL (ref 0.33–1.94)
KAPPA LC/LAMBDA SER: 0.08 {RATIO} (ref 0.26–1.65)
LAMBDA LC SERPL-MCNC: 37.74 MG/DL (ref 0.57–2.63)

## 2024-11-08 ENCOUNTER — OFFICE VISIT (OUTPATIENT)
Dept: HEMATOLOGY/ONCOLOGY | Facility: HOSPITAL | Age: 69
End: 2024-11-08
Payer: MEDICARE

## 2024-11-08 VITALS
OXYGEN SATURATION: 95 % | BODY MASS INDEX: 43.56 KG/M2 | TEMPERATURE: 96.8 F | SYSTOLIC BLOOD PRESSURE: 146 MMHG | DIASTOLIC BLOOD PRESSURE: 96 MMHG | RESPIRATION RATE: 14 BRPM | WEIGHT: 286.4 LBS | HEART RATE: 67 BPM

## 2024-11-08 DIAGNOSIS — L40.0 PLAQUE PSORIASIS: ICD-10-CM

## 2024-11-08 DIAGNOSIS — D86.9 SARCOIDOSIS: ICD-10-CM

## 2024-11-08 DIAGNOSIS — D47.2 MGUS (MONOCLONAL GAMMOPATHY OF UNKNOWN SIGNIFICANCE): Primary | ICD-10-CM

## 2024-11-08 DIAGNOSIS — M47.817 LUMBOSACRAL SPONDYLOSIS WITHOUT MYELOPATHY: ICD-10-CM

## 2024-11-08 DIAGNOSIS — I89.0 LYMPHEDEMA: ICD-10-CM

## 2024-11-08 PROCEDURE — 4010F ACE/ARB THERAPY RXD/TAKEN: CPT

## 2024-11-08 PROCEDURE — 3080F DIAST BP >= 90 MM HG: CPT

## 2024-11-08 PROCEDURE — 1126F AMNT PAIN NOTED NONE PRSNT: CPT

## 2024-11-08 PROCEDURE — 3060F POS MICROALBUMINURIA REV: CPT

## 2024-11-08 PROCEDURE — 99417 PROLNG OP E/M EACH 15 MIN: CPT

## 2024-11-08 PROCEDURE — 3077F SYST BP >= 140 MM HG: CPT

## 2024-11-08 PROCEDURE — 99214 OFFICE O/P EST MOD 30 MIN: CPT

## 2024-11-08 PROCEDURE — 99204 OFFICE O/P NEW MOD 45 MIN: CPT

## 2024-11-08 ASSESSMENT — PAIN SCALES - GENERAL: PAINLEVEL_OUTOF10: 0-NO PAIN

## 2024-11-08 ASSESSMENT — ENCOUNTER SYMPTOMS
ALLERGIC/IMMUNOLOGIC NEGATIVE: 1
PSYCHIATRIC NEGATIVE: 1
RESPIRATORY NEGATIVE: 1
ARTHRALGIAS: 1
EYES NEGATIVE: 1
WEAKNESS: 1
BACK PAIN: 1
ENDOCRINE NEGATIVE: 1
GASTROINTESTINAL NEGATIVE: 1
HEMATOLOGIC/LYMPHATIC NEGATIVE: 1
FATIGUE: 1

## 2024-11-08 NOTE — PROGRESS NOTES
Patient ID: Sonam Saab is a 69 y.o. female.  Referring Physician: No referring provider defined for this encounter.  Primary Care Provider: Andrea Maddox MD  Date of Service:  11/8/2024    She is a 69 year old female with an extensive medical history of HTN, SVT, Pre diabetes, obesity, sarcoidosis, cataracts, glaucoma, arthritis, and shortness of breath.  She presents to clinic as a referral from Dr. Fam in nephrology for newly detected lambda light chain 0.1 g/dL M protein.      Workup:   SPEP: 5/11 M protein 0.1 g/dL lambda light chain, analysis on 5/16 showed 0.1 g/dL kappa light chains.  Light chains: LFLC 5/11 61.13, LFLC 5/16 43.58  24 hour urine: showed total protein of 398  UPEP pending from 5/27  PET/CT 6/7 showed nonspecific focal hypermetabolic activity within gastroesophageal junction may be inflammatory vs. neoplasm. Recommended follow up with endoscopy.   BMBx 7/13 3-5% Lambda restricted plasma cells   EGD/Colonoscopy negative    Medical History:  HTN  SVT  Pre diabetes  Morbid Obesity  Shortness of breath  Chronic cough  Vertigo  Cataracts  Glaucoma  Arthritis   Sarcoidosis      Surgical History:  Adenoidectomy  Cholecystectomy   Cochlear Implant Surgery  Complete Colonoscopy  Diagnostic Esophagogastroduodenoscopy   Gastric Surgery  Sinus Surgery  Tonsillectomy   Total Hysterectomy      Family History:  CHF, Breast cancer, arthritis, colon cancer, DM, HTN     Social History:  Social drinker  Daily caffeine consumption    Retired post   Former smoker    SUBJECTIVE:  History of Present Illness:  Sonam presents to clinic 11/8/24 for a follow up visit.    Overall she is doing well.    Notes that she has both good days and bad days.     Diagnosed with plaque psoriasis. She is currently on Otezla.    Planning to start Lymphedema therapy in December.      Review of Systems   Constitutional:  Positive for fatigue.   HENT: Negative.     Eyes: Negative.    Respiratory: Negative.      Cardiovascular:  Positive for leg swelling.   Gastrointestinal: Negative.    Endocrine: Negative.    Genitourinary: Negative.    Musculoskeletal:  Positive for arthralgias and back pain.   Skin: Negative.    Allergic/Immunologic: Negative.    Neurological:  Positive for weakness.   Hematological: Negative.    Psychiatric/Behavioral: Negative.       OBJECTIVE:  KPS: Karnofsky Score: 80 - Normal activity with effort; some signs or symptoms of disease   VS:  BP (!) 146/96 (BP Location: Left arm, Patient Position: Sitting, BP Cuff Size: Adult)   Pulse 67   Temp 36 °C (96.8 °F) (Skin)   Resp 14   Wt 130 kg (286 lb 6.4 oz)   SpO2 95%   BMI 43.56 kg/m²   BSA: 2.5 meters squared    Physical Exam  Constitutional:       Appearance: Normal appearance. She is obese.   HENT:      Head: Normocephalic and atraumatic.      Nose: Nose normal.      Mouth/Throat:      Mouth: Mucous membranes are moist.      Pharynx: Oropharynx is clear.   Eyes:      Conjunctiva/sclera: Conjunctivae normal.      Pupils: Pupils are equal, round, and reactive to light.   Cardiovascular:      Rate and Rhythm: Regular rhythm. Tachycardia present.      Pulses: Normal pulses.      Heart sounds: Normal heart sounds.   Pulmonary:      Breath sounds: Normal breath sounds.   Abdominal:      General: Abdomen is flat. Bowel sounds are normal.      Palpations: Abdomen is soft.   Musculoskeletal:         General: Normal range of motion.      Cervical back: Normal range of motion and neck supple.      Right lower leg: Edema present.      Left lower leg: Edema present.   Skin:     General: Skin is warm and dry.   Neurological:      General: No focal deficit present.      Mental Status: She is alert and oriented to person, place, and time. Mental status is at baseline.   Psychiatric:         Mood and Affect: Mood normal.         Behavior: Behavior normal.         Thought Content: Thought content normal.         Judgment: Judgment normal.       Laboratory:  The  pertinent laboratory results were reviewed and discussed with the patient.    Lab Results   Component Value Date    WBC 5.2 11/06/2024    HCT 42.1 11/06/2024    HGB 13.7 11/06/2024     11/06/2024    K 3.6 11/06/2024    CALCIUM 9.3 11/06/2024     11/06/2024    MG 1.90 06/20/2022    ALT 10 11/06/2024    AST 12 11/06/2024    BUN 16 11/06/2024    CREATININE 0.89 11/06/2024    PHOS 4.2 12/01/2022    KAPPA 3.12 (H) 11/06/2024    LAMBDA 37.74 (H) 11/06/2024    KAPLS 0.08 (L) 11/06/2024    SPEP Aberrant band detected. See immunofixation.     03/11/2024    IEPIN  03/11/2024     3/11/24 Known monoclonal free lambda light chains in the gamma region at a level too low to quantitate.  Unchanged from the previous analysis on 8/29/23.     11/06/2024    IGM 55 11/06/2024     11/06/2024      Note: for a comprehensive list of the patient's lab results, access the Results Review activity.    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 neoplasm related  - Followed with GI, has endoscopy scheduled for 8/2022-negative for neoplastic process  - BMBx 7/13/22 showed 3-5% lambda restricted plasma cells  - Will plan to move follow up to yearly      Cards:  - Pt. of Dr. Mark Gilliland  - History of HTN, SVT  - On Metoprolol 200mg daily, Amlodipine 10mg daily, and Furosemide 40mg daily  - Swelling has improved but is still there, goes to lymphedema clinic      Renal:  - Referred to nephrology by Dr. Gilliland   - Follows with Dr. Cristel Walter:  - History of  cough, SOB  - Pulm follow up 6/8 (per pt.)  - Had high resolution CT, showed concern for pulmonary HTN, aortic valve disease, and a prominent subcarinal lymph node.   - Awaiting follow up with Dr. MONSON, called this AM  - Just completed prednisone, cough has cleared up SOB has improved  - Had COVID in 11/2023, residual cough    Plaque Psoriasis:  - Currently taking Otezla    Obesity:  - On Wegovy    Lymphedema:  - In bilateral legs  - Has wraps, scheduled for lymphedema therapy in December 2024     RTC:  1 year     Silvano Loredo, KEITH-CNP

## 2024-11-11 LAB
ALBUMIN: 3.4 G/DL (ref 3.4–5)
ALPHA 1 GLOBULIN: 0.3 G/DL (ref 0.2–0.6)
ALPHA 2 GLOBULIN: 0.6 G/DL (ref 0.4–1.1)
BETA GLOBULIN: 0.9 G/DL (ref 0.5–1.2)
GAMMA GLOBULIN: 0.9 G/DL (ref 0.5–1.4)
IMMUNOFIXATION COMMENT: NORMAL
PATH REVIEW - SERUM IMMUNOFIXATION: NORMAL
PATH REVIEW-SERUM PROTEIN ELECTROPHORESIS: NORMAL
PROTEIN ELECTROPHORESIS COMMENT: NORMAL

## 2024-12-09 ENCOUNTER — APPOINTMENT (OUTPATIENT)
Dept: PHYSICAL THERAPY | Facility: CLINIC | Age: 69
End: 2024-12-09
Payer: MEDICARE

## 2024-12-09 ENCOUNTER — TELEPHONE (OUTPATIENT)
Dept: PHYSICAL THERAPY | Facility: CLINIC | Age: 69
End: 2024-12-09
Payer: MEDICARE

## 2024-12-10 ENCOUNTER — LAB (OUTPATIENT)
Dept: LAB | Facility: LAB | Age: 69
End: 2024-12-10
Payer: MEDICARE

## 2024-12-10 DIAGNOSIS — Z79.891 LONG TERM CURRENT USE OF OPIATE ANALGESIC: ICD-10-CM

## 2024-12-10 LAB
AMPHETAMINES UR QL SCN: NORMAL
BARBITURATES UR QL SCN: NORMAL
BZE UR QL SCN: NORMAL
CANNABINOIDS UR QL SCN: NORMAL
CREAT UR-MCNC: 68.1 MG/DL (ref 20–320)
PCP UR QL SCN: NORMAL

## 2024-12-10 PROCEDURE — 80321 ALCOHOLS BIOMARKERS 1OR 2: CPT

## 2024-12-10 PROCEDURE — 80354 DRUG SCREENING FENTANYL: CPT

## 2024-12-10 PROCEDURE — 80307 DRUG TEST PRSMV CHEM ANLYZR: CPT

## 2024-12-10 PROCEDURE — 82570 ASSAY OF URINE CREATININE: CPT

## 2024-12-10 PROCEDURE — 80346 BENZODIAZEPINES1-12: CPT

## 2024-12-10 PROCEDURE — 80361 OPIATES 1 OR MORE: CPT

## 2024-12-10 PROCEDURE — 80373 DRUG SCREENING TRAMADOL: CPT

## 2024-12-10 PROCEDURE — 80358 DRUG SCREENING METHADONE: CPT

## 2024-12-10 PROCEDURE — 80368 SEDATIVE HYPNOTICS: CPT

## 2024-12-12 ENCOUNTER — OFFICE VISIT (OUTPATIENT)
Dept: PAIN MEDICINE | Facility: CLINIC | Age: 69
End: 2024-12-12
Payer: OTHER GOVERNMENT

## 2024-12-12 DIAGNOSIS — S43.402A SPRAIN OF LEFT SHOULDER, UNSPECIFIED SHOULDER SPRAIN TYPE, INITIAL ENCOUNTER: ICD-10-CM

## 2024-12-12 DIAGNOSIS — S80.02XA CONTUSION OF LEFT KNEE, INITIAL ENCOUNTER: ICD-10-CM

## 2024-12-12 DIAGNOSIS — S51.011A LACERATION OF RIGHT ELBOW, INITIAL ENCOUNTER: ICD-10-CM

## 2024-12-12 DIAGNOSIS — S33.5XXA LUMBAR SPRAIN, INITIAL ENCOUNTER: ICD-10-CM

## 2024-12-12 RX ORDER — OXYCODONE AND ACETAMINOPHEN 7.5; 325 MG/1; MG/1
1 TABLET ORAL EVERY 8 HOURS PRN
Qty: 75 TABLET | Refills: 0 | Status: SHIPPED | OUTPATIENT
Start: 2024-12-12 | End: 2025-01-09

## 2024-12-12 RX ORDER — TIZANIDINE 2 MG/1
2-4 TABLET ORAL NIGHTLY
Qty: 84 TABLET | Refills: 1 | Status: SHIPPED | OUTPATIENT
Start: 2024-12-12 | End: 2025-01-09

## 2024-12-12 RX ORDER — LUBIPROSTONE 24 UG/1
24 CAPSULE ORAL
Qty: 60 CAPSULE | Refills: 1 | Status: SHIPPED | OUTPATIENT
Start: 2024-12-12 | End: 2025-01-11

## 2024-12-12 RX ORDER — OXYCODONE AND ACETAMINOPHEN 7.5; 325 MG/1; MG/1
1 TABLET ORAL EVERY 8 HOURS PRN
Qty: 75 TABLET | Refills: 0 | Status: SHIPPED | OUTPATIENT
Start: 2025-01-09 | End: 2025-02-06

## 2024-12-12 RX ORDER — TRAZODONE HYDROCHLORIDE 50 MG/1
50 TABLET ORAL NIGHTLY
Qty: 30 TABLET | Refills: 1 | Status: SHIPPED | OUTPATIENT
Start: 2024-12-12 | End: 2025-01-11

## 2024-12-12 ASSESSMENT — ANXIETY QUESTIONNAIRES
7. FEELING AFRAID AS IF SOMETHING AWFUL MIGHT HAPPEN: NOT AT ALL
6. BECOMING EASILY ANNOYED OR IRRITABLE: NOT AT ALL
IF YOU CHECKED OFF ANY PROBLEMS ON THIS QUESTIONNAIRE, HOW DIFFICULT HAVE THESE PROBLEMS MADE IT FOR YOU TO DO YOUR WORK, TAKE CARE OF THINGS AT HOME, OR GET ALONG WITH OTHER PEOPLE: NOT DIFFICULT AT ALL
1. FEELING NERVOUS, ANXIOUS, OR ON EDGE: NOT AT ALL
GAD7 TOTAL SCORE: 0
3. WORRYING TOO MUCH ABOUT DIFFERENT THINGS: NOT AT ALL
2. NOT BEING ABLE TO STOP OR CONTROL WORRYING: NOT AT ALL
5. BEING SO RESTLESS THAT IT IS HARD TO SIT STILL: NOT AT ALL
4. TROUBLE RELAXING: NOT AT ALL

## 2024-12-12 ASSESSMENT — ENCOUNTER SYMPTOMS
DEPRESSION: 0
OCCASIONAL FEELINGS OF UNSTEADINESS: 0
LOSS OF SENSATION IN FEET: 0

## 2024-12-12 ASSESSMENT — PATIENT HEALTH QUESTIONNAIRE - PHQ9
2. FEELING DOWN, DEPRESSED OR HOPELESS: NOT AT ALL
1. LITTLE INTEREST OR PLEASURE IN DOING THINGS: NOT AT ALL
SUM OF ALL RESPONSES TO PHQ9 QUESTIONS 1 AND 2: 0

## 2024-12-12 ASSESSMENT — COLUMBIA-SUICIDE SEVERITY RATING SCALE - C-SSRS
2. HAVE YOU ACTUALLY HAD ANY THOUGHTS OF KILLING YOURSELF?: NO
1. IN THE PAST MONTH, HAVE YOU WISHED YOU WERE DEAD OR WISHED YOU COULD GO TO SLEEP AND NOT WAKE UP?: NO
6. HAVE YOU EVER DONE ANYTHING, STARTED TO DO ANYTHING, OR PREPARED TO DO ANYTHING TO END YOUR LIFE?: NO

## 2024-12-12 NOTE — PROGRESS NOTES
DOL claim [id number]  DOI 08/30/2019       · Contusion of knee, left  (S80.02XA)   · Elbow laceration, right, initial encounter  (S51.011A)   · Sprain ligaments of lumbar spine S33.5XXA   . Unsp Sprain left shoulder joint, initial encounter S43.402A      Chief complaint  Pain left knee and pain of the left shoulder and lower back     History  Sonam Saab is back for pain management office visit  Continues to have the pain with limited ambulation.  She will be stopping physical therapy.  Her pain is limiting her function and activities of daily living.  We are treating this pain and looking for the physical therapy to control her symptoms further.  I explained to her that   she needs to be physically active and that will help with the pain.  We simply cannot just see if the pain was 1 prong that is the pain medication we have to have multifaceted approach.  The pain medication alone are not going to work.    Again today, Long discussion about putting effort in cutting back on pain medications. Discussed about pain level changing and we do not know if the medications at this amount are still needed until we try and cut back slowly on pain medications. If the cut is tolerated then we continue. If cut not tolerated then, will go back on the pain medications level.  The goal from this is to keep the pain medications at the lowest effective dose.   We will decrease the oxycodone from 84 a day months to 75 a month I encouraged her to continue with physical therapy and also with a home exercise program      Pain level without medication is a/10 , with the medication pain level 4-5/10.  This is because of the recent aggravation but again I encouraged her to do more activities    The pain meds are helping control the pain and improving Activities of Daily living and quality of life and quality of sleep.    opioids treatment agreement February 2024  Pill count today, using count tray, and in front of patient : 0    pills ,  "last fill was on November 11 for 84 tabs,  the count is correct  Oarrs pulled and reviewed, no concerns  last urine toxicology testing earlier this year and it was compliant we will repeat  Xray updated spine and knee and shoulder  ORT Score is 0 her depression is controlled  Pain pathology and pain generators spine and joints  Modalities tried injection, surgery, physical therapy, TENS unit, nonsteroidal anti-inflammatory medication multiple injections    Review of Systems :  Denied any fever or chills. No weight loss and no night sweats. No cough or sputum production. No diarrhea   The constipation has been responding to fibers and over the counter medications.     No bladder and bowel incontinence and no other changes in bladder and bowel. No skin changes.  Reports tiredness and fatigability only if the pain is not controlled.     Denied opioids diversion and abuse and denies alcoholism. Denies overuse of the pain medications.  No reported euphoria sensation or getting a \"high\" on the pain medications.    The control of the pain with the pain medications is helping the control of the symptoms and allowing the function and activities of daily living, enjoyment of life, improving the quality of life and sleep with less interruption by the pain. The goal is symptomatic control of the nonmalignant chronic pain and not to repair the permanent damage in the tissues inducing the chronic pain conditions. We are aiming to shift the focus from the nonmalignant chronic pain to other aspects of life by symptomatically treating this chronic pain. If this pain is not treated it will lead to major morbidity and it is also associated with increased risks of mortality. The patient understands those very clearly and also understand high risks of morbidity and mortality if not strictly adherent to the treatment recommendations and reporting any associated side effects. Also patient understand the full responsibility associated with " these medications to avoid abuse or overuse or any use of these medications for anything besides treating the patient's own chronic pain and nothing else under any circumstances.        Physical examination  Awake, alert and oriented for time place and persons   My nurse  Kristine LLAMAS LPN   was present during the entire history and physical examination      Examination of the left knee showed mild clinical effusion. Normal skin color and texture palpation of the knee showed tenderness over the medial and lateral joint line and the sensation of crepitation upon range of motion.  Range of motion from -2 degrees to 105 degrees. No medial lateral instability. No drawer sign.  Lachman is negative.  Positive Robert both medially and laterally.  Negative pivot shift.  Homans' sign is negative.  Calves are soft.  Knee flexion and extension is 4/5 and limited by the pain.     Diagnosis  Problem List Items Addressed This Visit       Contusion of left knee    Relevant Medications    tiZANidine (Zanaflex) 2 mg tablet    oxyCODONE-acetaminophen (Percocet) 7.5-325 mg tablet    oxyCODONE-acetaminophen (Percocet) 7.5-325 mg tablet (Start on 1/9/2025)    traZODone (Desyrel) 50 mg tablet    lubiprostone (Amitiza) 24 mcg capsule    Laceration of right elbow    Relevant Medications    tiZANidine (Zanaflex) 2 mg tablet    oxyCODONE-acetaminophen (Percocet) 7.5-325 mg tablet    oxyCODONE-acetaminophen (Percocet) 7.5-325 mg tablet (Start on 1/9/2025)    traZODone (Desyrel) 50 mg tablet    lubiprostone (Amitiza) 24 mcg capsule    Lumbar sprain    Relevant Medications    tiZANidine (Zanaflex) 2 mg tablet    oxyCODONE-acetaminophen (Percocet) 7.5-325 mg tablet    oxyCODONE-acetaminophen (Percocet) 7.5-325 mg tablet (Start on 1/9/2025)    traZODone (Desyrel) 50 mg tablet    lubiprostone (Amitiza) 24 mcg capsule    Sprain of shoulder, left    Relevant Medications    tiZANidine (Zanaflex) 2 mg tablet    oxyCODONE-acetaminophen (Percocet) 7.5-325  mg tablet    oxyCODONE-acetaminophen (Percocet) 7.5-325 mg tablet (Start on 1/9/2025)    traZODone (Desyrel) 50 mg tablet    lubiprostone (Amitiza) 24 mcg capsule        Plan  Reviewed the pain generators.  Went over the types of pain with neuropathic and nociceptive and different pathologies and therapeutic modalities. Discussed the mechanism of action of interventions from acupuncture, physical therapy , regular exercises, injections, botox, spinal cord stimulation, and role of surgery     Went over pathology of the intervertebral disc displacement and the anatomical relation to the Nerve roots and relation to the radicular symptoms. Went over treatment modalities with conservative treatment including acupuncture   and epidural steroid injection with fluoroscopy guidance and last resort of surgery    Based on the above findings and the clinical response to the opioids medications and improvement of the activities of daily living, sleep, and work performance. We made this complex decision to continue the opioids therapy in light of the evidence of the patient's responsibility in using the pain medications as prescribed for the nonmalignant chronic pain condition. We discussed about the use of the pain medications to treat the symptoms of chronic nonmalignant pain and we are not trying the repair the permanent damage in the tissues, rather we are trying to control the symptoms induced by the permanent damage to the tissues inducing the chronic pain condition and resulting disability. I explained the difference and discussed it with the patient and stressed the importance of knowing the difference especially because of the potential side effects and the potential addicting effect and habit forming nature of the dangerous drugs we are using to treat the symptoms of the chronic pain.      We discussed that we are prescribing the medications on good quinn and legitimate medical reason.     We reviewed the side effects  and precautions of opioids prescriptions as discussed in the opioids treatment agreement.    realizes the interaction between the therapeutic classes including the respiratory depression and potential death     Random drug testing   we will submit     Continue with physical therapy we will change oxycodone to 75 tablets a day in place of 84.  Continue with adjuvant therapy with muscle relaxers  Consider injection if needed    Discussed about NSAIDS and I explained about the opioids sparing effect to allow keeping the opioids dose at minimal effective dose.   I went over the potential side effects of the NSAIDS on the gastrointestinal, renal and cardiovascular systems.      I detailed the side effects from the acetaminophen in the medication and made aware of those. I also explained about the cumulative effects on the organs and mainly the liver.     Given the opioids therapy , we discussed about the risk for accidental over dose on the pain medications, either for patient or other household. I went over the mechanism of action and mode of use of the Naloxone according to the  recommendations. I will provide a prescription for a kit.     Follow-up 8 weeks or earlier if needed     The level of clinical decision making in this office visit,  is high, given the high risks of complications with the morbidity and mortality due to the fact that acute and chronic pain may pose a threat to life and bodily function, if under treated, poorly treated, or with failure to maintain adequate treatment and timely medical follow up. Additionally over treatment has its own set of complications including overdosing on the pain medications and also the habit forming potentials with the use of the medications used to treat chronic painful conditions including therapeutic classes classified as dangerous medications. Given the serious and fluctuating nature of pain level and instensity with extensive consideration for whenever  pain changes, there is always the risk of prolonged functional impairment requiring close patient monitoring with regular assessments and reassessments and high level medical decision making at every office visit. The amount and complexity of data reviewed is high given the patient clinical presentation, labs,  data, radiology reports, and other tests as discussed during office visits. Pertinent data whether positive or negative were taken in consideration in the process of making this high level medical decision.

## 2024-12-14 LAB
1OH-MIDAZOLAM UR CFM-MCNC: <25 NG/ML
6MAM UR CFM-MCNC: <25 NG/ML
7AMINOCLONAZEPAM UR CFM-MCNC: <25 NG/ML
A-OH ALPRAZ UR CFM-MCNC: <25 NG/ML
ALPRAZ UR CFM-MCNC: <25 NG/ML
CHLORDIAZEP UR CFM-MCNC: <25 NG/ML
CLONAZEPAM UR CFM-MCNC: <25 NG/ML
CODEINE UR CFM-MCNC: <50 NG/ML
DIAZEPAM UR CFM-MCNC: <25 NG/ML
EDDP UR CFM-MCNC: <25 NG/ML
FENTANYL UR CFM-MCNC: <2.5 NG/ML
HYDROCODONE CTO UR CFM-MCNC: <25 NG/ML
HYDROMORPHONE UR CFM-MCNC: <25 NG/ML
LORAZEPAM UR CFM-MCNC: <25 NG/ML
METHADONE UR CFM-MCNC: <25 NG/ML
MIDAZOLAM UR CFM-MCNC: <25 NG/ML
MORPHINE UR CFM-MCNC: <50 NG/ML
NORDIAZEPAM UR CFM-MCNC: <25 NG/ML
NORFENTANYL UR CFM-MCNC: <2.5 NG/ML
NORHYDROCODONE UR CFM-MCNC: <25 NG/ML
NOROXYCODONE UR CFM-MCNC: 566 NG/ML
NORTRAMADOL UR-MCNC: <50 NG/ML
OXAZEPAM UR CFM-MCNC: <25 NG/ML
OXYCODONE UR CFM-MCNC: 165 NG/ML
OXYMORPHONE UR CFM-MCNC: 64 NG/ML
TEMAZEPAM UR CFM-MCNC: <25 NG/ML
TRAMADOL UR CFM-MCNC: <50 NG/ML
ZOLPIDEM UR CFM-MCNC: <25 NG/ML
ZOLPIDEM UR-MCNC: <25 NG/ML

## 2024-12-20 ENCOUNTER — APPOINTMENT (OUTPATIENT)
Dept: PHYSICAL THERAPY | Facility: CLINIC | Age: 69
End: 2024-12-20
Payer: MEDICARE

## 2025-01-02 ENCOUNTER — APPOINTMENT (OUTPATIENT)
Dept: PAIN MEDICINE | Facility: CLINIC | Age: 70
End: 2025-01-02
Payer: MEDICARE

## 2025-02-03 ENCOUNTER — APPOINTMENT (OUTPATIENT)
Dept: PAIN MEDICINE | Facility: CLINIC | Age: 70
End: 2025-02-03
Payer: MEDICARE

## 2025-02-03 DIAGNOSIS — S33.5XXA LUMBAR SPRAIN, INITIAL ENCOUNTER: ICD-10-CM

## 2025-02-03 DIAGNOSIS — S43.402A SPRAIN OF LEFT SHOULDER, UNSPECIFIED SHOULDER SPRAIN TYPE, INITIAL ENCOUNTER: ICD-10-CM

## 2025-02-03 DIAGNOSIS — S80.02XA CONTUSION OF LEFT KNEE, INITIAL ENCOUNTER: ICD-10-CM

## 2025-02-03 DIAGNOSIS — S51.011A LACERATION OF RIGHT ELBOW, INITIAL ENCOUNTER: ICD-10-CM

## 2025-02-03 RX ORDER — TRAZODONE HYDROCHLORIDE 50 MG/1
50 TABLET ORAL NIGHTLY
Qty: 30 TABLET | Refills: 1 | Status: SHIPPED | OUTPATIENT
Start: 2025-02-03 | End: 2025-03-05

## 2025-02-03 RX ORDER — OXYCODONE AND ACETAMINOPHEN 7.5; 325 MG/1; MG/1
1 TABLET ORAL EVERY 8 HOURS PRN
Qty: 72 TABLET | Refills: 0 | Status: SHIPPED | OUTPATIENT
Start: 2025-03-08 | End: 2025-04-05

## 2025-02-03 RX ORDER — OXYCODONE AND ACETAMINOPHEN 7.5; 325 MG/1; MG/1
1 TABLET ORAL EVERY 8 HOURS PRN
Qty: 72 TABLET | Refills: 0 | Status: SHIPPED | OUTPATIENT
Start: 2025-02-08 | End: 2025-03-08

## 2025-02-03 RX ORDER — TIZANIDINE 2 MG/1
2-4 TABLET ORAL NIGHTLY
Qty: 84 TABLET | Refills: 1 | Status: SHIPPED | OUTPATIENT
Start: 2025-02-03 | End: 2025-03-03

## 2025-02-03 ASSESSMENT — PATIENT HEALTH QUESTIONNAIRE - PHQ9
1. LITTLE INTEREST OR PLEASURE IN DOING THINGS: NOT AT ALL
SUM OF ALL RESPONSES TO PHQ9 QUESTIONS 1 AND 2: 0
2. FEELING DOWN, DEPRESSED OR HOPELESS: NOT AT ALL

## 2025-02-03 ASSESSMENT — ENCOUNTER SYMPTOMS
DEPRESSION: 0
LOSS OF SENSATION IN FEET: 1
OCCASIONAL FEELINGS OF UNSTEADINESS: 1

## 2025-02-03 NOTE — PROGRESS NOTES
DOL claim [id number]  DOI 08/30/2019       · Contusion of knee, left  (S80.02XA)   · Elbow laceration, right, initial encounter  (S51.011A)   · Sprain ligaments of lumbar spine S33.5XXA   . Unsp Sprain left shoulder joint, initial encounter S43.402A      Chief complaint  Lt knee pain and left shoulder  Back pain    My nurse  Kristine LLAMAS LPN,   was present during the entire history and physical examination    History  Sonam Saab is back for pain management office visit  She continues to have the pain in the left knee and left shoulder and pain in the back down the lower limb.  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  Apparently the Department of Labor obtain second opinion from an independent examiner but that examiner did not and sent to the Department of Labor questions.  The Department of Labor is trying to get this question straight.  In order to make a decision about the claim    In the meanwhile she continues to have the pain in the left knee deep achy stabbing this is mechanical.  She also have pain of the left shoulder with impingement this is also mechanical Get worse with weightbearing and movement.  The pain in the back is related to this claim and to prior claim and she does have radiation of the pain to the lower limbs in the past she had injection with some relief    She is on pain medication, we are trying to cut back on the medication, long discussion about putting effort in cutting back on pain medications. Discussed about pain level changing and we do not know if the medications at this amount are still needed until we try and cut back slowly on pain medications. If the cut is tolerated then we continue. If cut not tolerated then, will go back on the pain medications level.  The goal from this is to keep the pain medications at the lowest effective dose.      Pain level without medication is 8/10 , with the medication pain level 2 to 3/10.     Pain disability index improvement by 2  "to 3  points, across functional categories, with the pain control with the meds. Forms filled by patient are scanned in the chart    The pain meds are helping control the pain and improving Activities of Daily living and quality of life and quality of sleep.    opioids treatment agreement Jan 2025    Pill count today, using count tray, and in front of patient :  11    pills , last fill was on 1/11  for 75 tabs,  the count is correct  Oarrs pulled and reviewed, no concerns  last urine toxicology testing was compliant this was done on : Dec 2024  Xray updated spine and joints  ORT Score is 0  Pain pathology and pain generators spine and joints  Modalities tried injection, surgery, physical therapy, TENS unit, nonsteroidal anti-inflammatory medication multiple injection    Review of Systems :  Denied any fever or chills. No weight loss and no night sweats. No cough or sputum production. No diarrhea   The constipation has been responding to fibers and over the counter medications.     No bladder and bowel incontinence and no other changes in bladder and bowel. No skin changes.  Reports tiredness and fatigability only if the pain is not controlled.     Denied opioids diversion and abuse and denies alcoholism. Denies overuse of the pain medications.  No reported euphoria sensation or getting a \"high\" on the pain medications.    The control of the pain with the pain medications is helping the control of the symptoms and allowing the function and activities of daily living, enjoyment of life, improving the quality of life and sleep with less interruption by the pain. The goal is symptomatic control of the nonmalignant chronic pain and not to repair the permanent damage in the tissues inducing the chronic pain conditions. We are aiming to shift the focus from the nonmalignant chronic pain to other aspects of life by symptomatically treating this chronic pain. If this pain is not treated it will lead to major morbidity and it " is also associated with increased risks of mortality. The patient understands those very clearly and also understand high risks of morbidity and mortality if not strictly adherent to the treatment recommendations and reporting any associated side effects. Also patient understand the full responsibility associated with these medications to avoid abuse or overuse or any use of these medications for anything besides treating the patient's own chronic pain and nothing else under any circumstances.        Physical examination  Awake, alert and oriented for time place and persons     She has impingement of the left shoulder at about 110 degrees no subluxation negative cervical root tension sign.  The left knee showed pain over the medial aspect positive Robert no instability.  Minimal joint effusion.  Lumbar spine showed reversal of lumbar lordosis spasm in the paraspinal muscles.  Range of motion of the lumbar spine is limited by tight muscle bands straight leg raising increase the pain on the right lower limb    Diagnosis  Problem List Items Addressed This Visit       Contusion of left knee    Relevant Medications    oxyCODONE-acetaminophen (Percocet) 7.5-325 mg tablet (Start on 2/8/2025)    oxyCODONE-acetaminophen (Percocet) 7.5-325 mg tablet (Start on 3/8/2025)    tiZANidine (Zanaflex) 2 mg tablet    traZODone (Desyrel) 50 mg tablet    Other Relevant Orders    Referral to Physical Therapy    Laceration of right elbow    Relevant Medications    oxyCODONE-acetaminophen (Percocet) 7.5-325 mg tablet (Start on 2/8/2025)    oxyCODONE-acetaminophen (Percocet) 7.5-325 mg tablet (Start on 3/8/2025)    tiZANidine (Zanaflex) 2 mg tablet    traZODone (Desyrel) 50 mg tablet    Other Relevant Orders    Referral to Physical Therapy    Lumbar sprain    Relevant Medications    oxyCODONE-acetaminophen (Percocet) 7.5-325 mg tablet (Start on 2/8/2025)    oxyCODONE-acetaminophen (Percocet) 7.5-325 mg tablet (Start on 3/8/2025)     tiZANidine (Zanaflex) 2 mg tablet    traZODone (Desyrel) 50 mg tablet    Other Relevant Orders    Referral to Physical Therapy    Sprain of shoulder, left    Relevant Medications    oxyCODONE-acetaminophen (Percocet) 7.5-325 mg tablet (Start on 2/8/2025)    oxyCODONE-acetaminophen (Percocet) 7.5-325 mg tablet (Start on 3/8/2025)    tiZANidine (Zanaflex) 2 mg tablet    traZODone (Desyrel) 50 mg tablet    Other Relevant Orders    Referral to Physical Therapy        Plan  Reviewed the pain generators.  Went over the types of pain with neuropathic and nociceptive and different pathologies and therapeutic modalities. Discussed the mechanism of action of interventions from acupuncture, physical therapy , regular exercises, injections, botox, spinal cord stimulation, and role of surgery     Went over pathology of the intervertebral disc displacement and the anatomical relation to the Nerve roots and relation to the radicular symptoms. Went over treatment modalities with conservative treatment including acupuncture   and epidural steroid injection with fluoroscopy guidance and last resort of surgery    Based on the above findings and the clinical response to the opioids medications and improvement of the activities of daily living, sleep, and work performance. We made this complex decision to continue the opioids therapy in light of the evidence of the patient's responsibility in using the pain medications as prescribed for the nonmalignant chronic pain condition. We discussed about the use of the pain medications to treat the symptoms of chronic nonmalignant pain and we are not trying the repair the permanent damage in the tissues, rather we are trying to control the symptoms induced by the permanent damage to the tissues inducing the chronic pain condition and resulting disability. I explained the difference and discussed it with the patient and stressed the importance of knowing the difference especially because of the  potential side effects and the potential addicting effect and habit forming nature of the dangerous drugs we are using to treat the symptoms of the chronic pain.      We discussed that we are prescribing the medications on good quinn and legitimate medical reason.     We reviewed the side effects and precautions of opioids prescriptions as discussed in the opioids treatment agreement.    realizes the interaction between the therapeutic classes including the respiratory depression and potential death     Random drug testing   we will submit     Again today we asked still trying to cut back on the medication discussed with her about trying to find a lower effective dose  Today will cut back oxycodeone to 72 tab for hte month. Will try cutting again with the weather warming up we will keep with adjuvant therapy including Zanaflex muscle relaxants and trazodone to help her with pain and sleep    Discussed about NSAIDS and I explained about the opioids sparing effect to allow keeping the opioids dose at minimal effective dose.   I went over the potential side effects of the NSAIDS on the gastrointestinal, renal and cardiovascular systems.      I detailed the side effects from the acetaminophen in the medication and made aware of those. I also explained about the cumulative effects on the organs and mainly the liver.     Given the opioids therapy , we discussed about the risk for accidental over dose on the pain medications, either for patient or other household. I went over the mechanism of action and mode of use of the Naloxone according to the  recommendations. I will provide a prescription for a kit.     Follow-up 8 weeks or earlier if needed     The level of clinical decision making in this office visit,  is high, given the high risks of complications with the morbidity and mortality due to the fact that acute and chronic pain may pose a threat to life and bodily function, if under treated, poorly  treated, or with failure to maintain adequate treatment and timely medical follow up. Additionally over treatment has its own set of complications including overdosing on the pain medications and also the habit forming potentials with the use of the medications used to treat chronic painful conditions including therapeutic classes classified as dangerous medications. Given the serious and fluctuating nature of pain level and instensity with extensive consideration for whenever pain changes, there is always the risk of prolonged functional impairment requiring close patient monitoring with regular assessments and reassessments and high level medical decision making at every office visit. The amount and complexity of data reviewed is high given the patient clinical presentation, labs,  data, radiology reports, and other tests as discussed during office visits. Pertinent data whether positive or negative were taken in consideration in the process of making this high level medical decision.

## 2025-02-06 ENCOUNTER — APPOINTMENT (OUTPATIENT)
Dept: PAIN MEDICINE | Facility: CLINIC | Age: 70
End: 2025-02-06
Payer: MEDICARE

## 2025-02-27 ENCOUNTER — APPOINTMENT (OUTPATIENT)
Dept: PHYSICAL THERAPY | Facility: CLINIC | Age: 70
End: 2025-02-27
Payer: MEDICARE

## 2025-03-20 ENCOUNTER — EVALUATION (OUTPATIENT)
Dept: PHYSICAL THERAPY | Facility: CLINIC | Age: 70
End: 2025-03-20
Payer: MEDICARE

## 2025-03-20 DIAGNOSIS — G89.29 CHRONIC BACK PAIN: Primary | ICD-10-CM

## 2025-03-20 DIAGNOSIS — S33.5XXA LUMBAR SPRAIN, INITIAL ENCOUNTER: ICD-10-CM

## 2025-03-20 DIAGNOSIS — S43.402A SPRAIN OF LEFT SHOULDER, UNSPECIFIED SHOULDER SPRAIN TYPE, INITIAL ENCOUNTER: ICD-10-CM

## 2025-03-20 DIAGNOSIS — M54.9 CHRONIC BACK PAIN: Primary | ICD-10-CM

## 2025-03-20 PROBLEM — R29.898 LEG WEAKNESS: Status: ACTIVE | Noted: 2025-03-20

## 2025-03-20 PROCEDURE — 97110 THERAPEUTIC EXERCISES: CPT | Mod: GP

## 2025-03-20 PROCEDURE — 97162 PT EVAL MOD COMPLEX 30 MIN: CPT | Mod: GP

## 2025-03-20 ASSESSMENT — ENCOUNTER SYMPTOMS
LOSS OF SENSATION IN FEET: 1
OCCASIONAL FEELINGS OF UNSTEADINESS: 1
DEPRESSION: 0

## 2025-03-20 NOTE — PROGRESS NOTES
Physical Therapy Examination and Treatment Note    Patient Name: Sonam Saab  MRN: [id number]  Today's Date: 3/20/2025    Insurance:  Visit number: 1 of 8  Authorization info: Yes - 50/year  Insurance Type: Payor: MEDICARE / Plan: MEDICARE PART A AND B / Product Type: *No Product type* /     General:  Reason for visit: lumbar sprain  Referred by: SHASHI Gil  Current Problem  Problem List Items Addressed This Visit             ICD-10-CM    Lumbar sprain S33.5XXA    Relevant Orders    Follow Up In Physical Therapy    Sprain of shoulder, left S43.402A    Relevant Orders    Follow Up In Physical Therapy    Chronic back pain - Primary M54.9, G89.29       Precautions: HBP and osteopenia , restrictive lung disease   OTHER: fibromyalgia, lymphedema ( has compression bands - will be starting lymphedema therapy)  Red Flags: Do you have any of the following? No  Fever/chills, unexplained weight changes, dizziness/fainting, unexplained change in bowel or bladder functions, unexplained malaise or muscle weakness, night pain/sweats, numbness or tingling  FALLS RISK: moderate/high, 2 falls in past year - in shower                        STEADI 9    Medical History Form: Reviewed (scanned into chart)    Subjective   Chief Complaint: Patient presents to clinic with referral for low back sprain. Told me I have a pinched nerve.  Onset Date: 2001. Worsened recently about 10 years ago. Refer to PT now due to difficulty with walking and pain meds help only temporarily. Pain waking from sleep.   VLADIMIR: Chronic  Current Condition:   Worse    Pain:     Current: 5  with pain meds. At Worst: 7  Location: pain down back of legs, feet, toes  Description: burning, stabbing  Aggravating Factors: standing, walking  Relieving Factors:  sitting  Numbness and Tingling: Yes  Description: feet and toes    INTEGRATIVE HISTORY:  SLEEP: wakes from sleep 3x/night due to pain, also disrupted due to water pills  STRESS: has 90 yo mom living with her, dealing  with a lot, taking wellbutrin  DIET: trying to incorporate veg and fruit. 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    OBJECTIVE:    OBSERVATION/POSTURE: increased APT    GAIT: arrived in wheel chair, uses walker at home at times    TRANSITIONS/TRANSFERS: independent with heavy use of UE    FUNCTIONAL MOVEMENT:   LUMBAR AROM  FF wfl  EXT to neutral  RSB to patella  LSB to patella  RR  50  LR   50    STRENGTH TESTING: on a scale of 0 to 5 unless otherwise noted    HIP  ------                                                                       FLEXION:       R          3               L            3    HIP ABD/ADD IN SITTING:     3+      abd         add  3+    KNEE  -------  EXTENSION     R        3+                L       3+  FLEXION          R          3+              L          3+       ANKLE  -------  DF                    R     3+                 L       3+      PF                    R     4                    L       4        FUNCTIONAL  -----   SIT TO STAND: rocking with multiple attempts to stand, heavy use of UE    FLEXIBILITY  Hip flexors tight  Gastroc mod limited    SPECIAL TEST: +slump L    BALANCE:  30STS: unable to perform  without UE A  4 stage balance:   Bilat 10 sec  Semi- tandem 10 sec  Tandem - unable to do  SLS - unable    Treatment:   PT Therapeutic Procedures Time Entry  Therapeutic Exercise Time Entry: 15    SANDRO  HEP issued as below   JOHN ELIZONDO    OTHER  - issued [facility] h/o preventing falls     Access Code: [email or web address]  URL: [email or web address]  Date: 03/20/2025  Prepared by: Sneha    Exercises  - Seated Cat Cow  - 2 x daily - 7 x weekly - 2 sets - 10 reps  - Supine Lower Trunk Rotation  - 2 x daily - 7 x weekly - 2 sets - 10 reps  - Supine Posterior Pelvic Tilt  - 2 x daily - 7 x weekly - 2 sets - 10 reps  - Seated Diaphragmatic Breathing  - 2 x daily - 7 x weekly - 2 sets - 10 reps      Assessment: moderate complexity  PT Assessment Results: Decreased strength, Decreased range of motion, Decreased endurance, Impaired balance, Decreased mobility, Pain  Rehab Prognosis: Fair  Problem list:  Patient presents to physical therapy presenting with symptoms consistent with chronic low back pain worsening [id number] with decreased tolerance to physical activity complicated fibromyalgia, depression, and elevated BMI.    Refer to evaluation for noted clinical impairments and deficits.  Patient is candidate for skilled physical therapy at this time to address goals as stated to improve function and overall quality of life.   Patient/caregiver have been educated in risks and benefits of physical therapy and demonstrate understanding and positive response to education.   Patient was educated on physical therapy clinical findings, anatomy as it relates to symptoms and diagnosis, and physical therapy plan of care.   Reviewed with patient the availability of estimated cost for physical therapy services and to review cost or co-pay with  staff.    Pt would benefit from physical therapy to address the impairments found & listed previously in the objective section in order to return to safe and  pain-free ADLs and prior level of function.  Evolving with changing characteristics  Depression, mood disorders: personal factors    PLAN:  Treatment/Interventions: Cryotherapy, Education/ Instruction, Electrical stimulation, Gait training, Hot pack, Manual therapy, Neuromuscular re-education, Therapeutic activities, Therapeutic exercises  Pt's goal: freedom from pain - to be able to get up and wash dishes do laundry help caretake of 92 yo mother, being able to stand greater than 10-15 min.  Care Plan/Goals:  +  reduce pain to <5/10 for improved tolerance of ADL's and housekeeping such as standing for >15 min to do dishes  +  Increase lumbar ROM by 10 degrees in all restricted planes of motion as needed for ADL's   +  The pt will have 4 /5 strength in  LE as needed to facilitate  improved balance and ambulation  +   The pt will walk with a wheeled walker for 150 or greater with pain at 5/10 or less   + patient will be independent and compliant with HEP measured by independent recall of 2 or more exercises  + Patient will be independent in techniques for self management of chronic pain    Frequency/duration: 1 times per week for 12 weeks for a total of 8 visits with frequency adjusted per progress    Ambulatory Screenings Summary       Screening  Frequency  Date Last Completed   Spiritual and Cultural Beliefs   Screening  each visit or episode of care 12/12/2024   Falls Risk Screening  every ambulatory visit 3/20/2025  3:03 PM   Pain Screening  annually at primary care visit  11/8/2024   Domestic Violence screening  annually at primary care visit 12/12/2024   Elder Abuse Screening  annually at primary care visit 12/12/2024   Depression Screening  annually in the primary care setting 2/3/2025   Suicide Risk Screening  annually in the primary care setting 12/12/2024   Nutrition and Food Insecurity   Screening  at least annually at primary care visit     Key Learner  annually in the primary care setting 12/12/2024    Drug Screen  2/3/2025  2:22 PM   Alcohol Screen  2/3/2025  2:22 PM   Advance Directive  2/3/2025       Time Calculation  Start Time: 0230  Stop Time: 0315  Time Calculation (min): 45 min  PT Evaluation Time Entry  PT Evaluation (Moderate) Time Entry: 30 PT Therapeutic Procedures Time Entry  Therapeutic Exercise Time Entry: 15

## 2025-03-27 ENCOUNTER — EVALUATION (OUTPATIENT)
Dept: PHYSICAL THERAPY | Facility: HOSPITAL | Age: 70
End: 2025-03-27
Payer: MEDICARE

## 2025-03-27 DIAGNOSIS — I89.0 LYMPHEDEMA: Primary | ICD-10-CM

## 2025-03-27 PROCEDURE — 97110 THERAPEUTIC EXERCISES: CPT | Mod: GP | Performed by: PHYSICAL THERAPIST

## 2025-03-27 PROCEDURE — 97163 PT EVAL HIGH COMPLEX 45 MIN: CPT | Mod: GP | Performed by: PHYSICAL THERAPIST

## 2025-03-27 PROCEDURE — 97535 SELF CARE MNGMENT TRAINING: CPT | Mod: GP | Performed by: PHYSICAL THERAPIST

## 2025-03-27 ASSESSMENT — ENCOUNTER SYMPTOMS
DEPRESSION: 0
OCCASIONAL FEELINGS OF UNSTEADINESS: 1
LOSS OF SENSATION IN FEET: 1

## 2025-03-27 NOTE — PROGRESS NOTES
Physical Therapy Evaluation and Treatment      Patient Name:Sonam Saab   MRN:[id number]   Today's Date:3/27/2025   Referred by: Ora Campos   Time Calculation  Start Time: 215  Stop Time: 325  Time Calculation (min): 70 min    PT Evaluation Time Entry  PT Evaluation (Complex) Time Entry: 45  PT Therapeutic Procedures Time Entry  Therapeutic Exercise Time Entry: 15  Self-Care/Home Mgmt Training: 10       Insurance  Visit number: 2   Approved number of visits: MN  Auth required: No  Authorization date range:   Onset Date: No onset impairment date on file.  Medicare Certification Period:  Beginning:     3/27/2025            Endin25    Payor: MEDICARE / Plan: MEDICARE PART A AND B / Product Type: *No Product type* /     Assessment:   68 yo female referred to PT for long standing swelling concern B LE, at time of evaluation pt displays min to no pitting B foot and ankle, neg pitting or other skin changes B lower leg, some skin color changes likely due to vascular origin, complicated by obesity and severely limited functional mobility, pt was seen in the recent past by a lymphedema therapist and was provided with ready wrap B calf and a lymphedema pump, pt has not been using them consistently, difficulty donning (daughter helps intermittently), at present, recommend pt to resume using her lymphedema pump, recommend continued use of her readywrap (will need new wrap, Rx request sent via fax to referring MD Dr. Campos), recommend continued effort of elevation, recommend and encouraged pt to work on remedial exercises (instructed today), walking (practiced today). Pt is being seen by another PT to work on chronic back pain and mobility, at this time, no further benefit from lymphedema therapy is perceived, pt will work on above recommendations and call if any questions.          Clinical Presentation: Unstable and unpredictable characteristics   Evaluation Complexity: high      Plan:   Visit number: 2   PT Plan: No  Additional PT interventions required at this time  Onset Date: 08/06/24  Certification Period Start Date: 03/27/25  Certification Period End Date: 06/25/25  Number of Treatments Authorized: [id number]  Plan of Care Agreement: Patient     Continue with current management with lymphedema pump, elevation, remedial exercises, compression via ready wrap( Obtain new ready wrap, request for Rx will fax to referring physician), encourage amb.   Plan of care was developed with input and agreement by the patient.     Precautions/Fall Risk:  Fall Risk: High based on professional judgment  Pacemaker: no    Seizures: No    Post Op Movement/Restrictions: No:  Weight Bearing Status: As tolerated  Other Medical precautions:    Therapy Diagnosis:  Problem List Items Addressed This Visit             ICD-10-CM       Symptoms and Signs    Lymphedema - Primary I89.0       Current Problem:   1. Lymphedema  Referral to Physical Therapy                Subjective:  General:  General  Reason for Referral: B LE lymphedema  Referred By: Ora Campos MD  Precautions:  Precautions  STEADI Fall Risk Score (The score of 4 or more indicates an increased risk of falling): 8  C/o: B LE s welling lower leg mostly but recently with thigh swelling.    HPI: pt states chronic B LE scarring and swelling 5-10 years ago, was last seen by a vascular clinic (Ora Campos, referral made to PT), and referral to lymphedema clinic. Pt was seen by lymphedema clinic via [facility] and provided compression wrap (ready wrapXL long calf ready wrap, 2 years old), provided a  lymphedema pump which pt uses intermittently over the past 2 years--can't use by herself, daughter helps.  Pt was also provided another referral  for lymphedema clinic since August 2024 by Dr. Campos, no clinical noted in EMR noted.    Pt states lost weight with medication (1x/week) 50# over the past 5 years.    Sleeping in regular bed (adjustable), difficulty getting in and out of bed.  Difficulty with turning in  bed.     On another note, pt was being followed by pain management and recently in PT for back pain.      PMH: HTN, SVT, Pre diabetes, obesity, sarcoidosis, cataracts, glaucoma, arthritis,     SOCIAL HX/JOB STATUS: lives with mother and daughter in apt, elevator access, 2nd floor,  daughter helps with ADLs. (Dressing, all other ADLs).   Pt has a wheel chair (her mother's), has a rolling walker (her sister's), has own cane and crutches for limited distance amb. States her rollator which broke.   Limited walking inside house only. Stays in bed a lot /sitting in chair all day.  Adjustable bed. Daughter brings in meals. Walks to BR.       BASELINE FUNCTION: very limited amb inside house, limited activities.     Pain:  Location of Pain: B knee pain, currently seen in PT for back pain.     Patient Awareness: Patient is aware of  her diagnosis and prognosis.       Objective   LE circumference measure R/L:   Date/R/L 3/27/25       Metatarsal 21.4/21.6       Arch  24.2/25.1       Malleoli 29.2/34.1       ankle 26.8/29.5       10cm above ankle 37.1/37.5       20cm above ankle 48.8/48.5       calf 52.6/53.2       Tibial tuberosity  52.0/53.0       knee        10cm above knee        20cm above knee        30cm above knee        Gluteal fold        Length (knee/thigh) 41 knee high, 34 lower leg         ROM: WFL B LE  Hip:  knee:  Ankle:    Functional mobility:  Transfer difficulty with transfer sit to stand, needs to use hand, slower transition, sit to supine with needs for asistance for B LE.    gait  balance    Skin appearance/condition:   Dark skin patch B ant shin similar to hemosiderin staining however not around lower leg (L Leg with slight darkening at post lower leg).    Slight hard pitting L>R dorsal foot and ankle, neg pitting lower leg   Neg fibrosis noted, slight skin thickening  Neg stemmer's sign.      Outcome Measures:        Treatments:     Treatment Performed Today:   Treatment provided today:  Educated patient  on lymphedema etiology, treatment rationale and expectations. All questions answered.  Educated on diaphragmatic breathing and decongestive exercises, see attached handout.  Educated on self management, elevation, compression (need new compression wrap, will send request via fax for Rx to referring physician), continue with lymphedema pump, encouraged walking at home.  Amb with rolling walker 15 feet with 2 rests.       Response to Treatment: improved knowledge and understanding of condition    Education/Resources provided today: Home Program   Soundtracker:  Access Code: [email or web address]  URL: [email or web address]  Date: 03/27/2025  Prepared by: Echo Peralta    Exercises  - Supine Diaphragmatic Breathing  - 2 x daily - 5 x weekly - 1 sets - 5 reps  - Seated Diaphragmatic Breathing  - 2 x daily - 5 x weekly - 1 sets - 5 reps  - Ankle Pumps with Compression Garment  - 2 x daily - 5 x weekly - 1 sets - 5 reps  - Supine Heel Slide  - 2 x daily - 5 x weekly - 1 sets - 5 reps  - Supine Butterfly Groin Stretch  - 2 x daily - 5 x weekly - 1 sets - 4 reps  - Seated Long Arc Quad  - 2 x daily - 5 x weekly - 1 sets - 5 reps  - Standing Heel Raises  - 2 x daily - 5 x weekly - 1 sets - 5 reps  - Squat with Chair Support  - 2 x daily - 5 x weekly - 1 sets - 5 reps    OP EDUCATION:  Outpatient Education  Individual(s) Educated: Patient  Education Provided: Home Exercise Program, POC  Patient/Caregiver Demonstrated Understanding: yes  Plan of Care Discussed and Agreed Upon: yes  Patient Response to Education: Patient/Caregiver Verbalized Understanding of Information    Goals:  Patient's Goal for Treatment: Reduce symptoms and Return to prior level of function    NA

## 2025-03-31 ENCOUNTER — APPOINTMENT (OUTPATIENT)
Dept: PAIN MEDICINE | Facility: CLINIC | Age: 70
End: 2025-03-31
Payer: MEDICARE

## 2025-03-31 DIAGNOSIS — S43.402A SPRAIN OF LEFT SHOULDER, UNSPECIFIED SHOULDER SPRAIN TYPE, INITIAL ENCOUNTER: ICD-10-CM

## 2025-03-31 DIAGNOSIS — S51.011A LACERATION OF RIGHT ELBOW, INITIAL ENCOUNTER: ICD-10-CM

## 2025-03-31 DIAGNOSIS — S33.5XXA LUMBAR SPRAIN, INITIAL ENCOUNTER: ICD-10-CM

## 2025-03-31 DIAGNOSIS — S80.02XA CONTUSION OF LEFT KNEE, INITIAL ENCOUNTER: Primary | ICD-10-CM

## 2025-03-31 PROCEDURE — 4010F ACE/ARB THERAPY RXD/TAKEN: CPT | Performed by: PHYSICAL MEDICINE & REHABILITATION

## 2025-03-31 PROCEDURE — 99214 OFFICE O/P EST MOD 30 MIN: CPT | Performed by: PHYSICAL MEDICINE & REHABILITATION

## 2025-03-31 RX ORDER — TRAZODONE HYDROCHLORIDE 50 MG/1
50 TABLET ORAL NIGHTLY
Qty: 30 TABLET | Refills: 1 | Status: SHIPPED | OUTPATIENT
Start: 2025-03-31 | End: 2025-04-30

## 2025-03-31 RX ORDER — TIZANIDINE 2 MG/1
2-4 TABLET ORAL NIGHTLY
Qty: 84 TABLET | Refills: 1 | Status: SHIPPED | OUTPATIENT
Start: 2025-03-31 | End: 2025-04-28

## 2025-03-31 RX ORDER — LUBIPROSTONE 24 UG/1
24 CAPSULE ORAL
Qty: 60 CAPSULE | Refills: 1 | Status: SHIPPED | OUTPATIENT
Start: 2025-03-31 | End: 2025-04-30

## 2025-03-31 RX ORDER — OXYCODONE AND ACETAMINOPHEN 7.5; 325 MG/1; MG/1
1 TABLET ORAL EVERY 8 HOURS PRN
Qty: 72 TABLET | Refills: 0 | Status: SHIPPED | OUTPATIENT
Start: 2025-05-04 | End: 2025-06-01

## 2025-03-31 RX ORDER — OXYCODONE AND ACETAMINOPHEN 7.5; 325 MG/1; MG/1
1 TABLET ORAL EVERY 8 HOURS PRN
Qty: 72 TABLET | Refills: 0 | Status: SHIPPED | OUTPATIENT
Start: 2025-04-06 | End: 2025-05-04

## 2025-03-31 NOTE — PROGRESS NOTES
DOL claim [id number]  DOI 08/30/2019       · Contusion of knee, left  (S80.02XA)   · Elbow laceration, right, initial encounter  (S51.011A)   · Sprain ligaments of lumbar spine S33.5XXA   . Unsp Sprain left shoulder joint, initial encounter S43.402A    Chief complaint  Back pain left knee pain right elbow pain     Verenda E LPN,   was present during the entire history and physical examination    History  Sonam Saab is back for pain management office visit  She is having aggravation  of hte pian and losing ROM. We sent her for PT at the last visit but we need PA and approval from insurance  She needs the PT to improve the ROM and help with the pain  She has been trying to do the home exercise program.  However because of the pain she was not going through the full Range of motion  which led to stiffness in her muscles and limited range of motion she need to go back and do the physical therapy we will do the prior authorization for her  The pain in the back is deep achy stabbing with radiation to the right lower limb on intermittent basis  She also have pain in the joint including the knee and elbow these are mechanical and worse with movement and any weight loading.    We have been working with her on cutting back the pain medication and this is working well.  She is currently taking 72 tablet for 28 days averaging between 2 and 3 tablet.  Because of the recent aggravation of the pain she wants to hold off on further cutting Back at this time    Detailed discussion and explanation about the need to try  and cut back on pain medications.  Entertained question about   pain level changing from acute, subacute to chronic pain.  Currently the pain and chronic.  The higher amount of medication were for the acute and subacute phase.  Therefore   we do not know exactly if the medications at this amount are still needed until we try and cut back slowly on pain medications. If the cut is tolerated then we continue trying  to cut back further. If cut not tolerated then, will try additional none chemical methods like injection or physical therapy or we can go back on the pain medications level.  The goal from this is to keep the pain medications at the lowest effective dose and to control the tolerance that develops from the chronic use of opioid therapy.  Our goal is to keep the pain controlled with minimal effective dose.  That will help cutting back on the potential for side effects, and also will help decrease the amount of tolerance developing from the chronic use of opioid medication.     Pain level without medication is 8/10 , with the medication pain level between 2 and 3/10.     Pain disability index (PDI) improvement by 2-3 point points, across different functional categories, with the pain control with the meds. Forms filled by patient are scanned in the chart.  This is not changed from the last office visit    The pain meds are helping control the pain and improving Activities of Daily living and quality of life and quality of sleep.    opioids treatment agreement Jan 2025    Pill count today, using count tray, and in front of patient, Nurse and myself :  14    pills , last fill was on 3/9  for 72 tabs,  the meds pill count today is correct  Oarrs pulled and reviewed, no concerns  last urine toxicology testing was compliant this was done on : Dec 2024  Xray updated spine and joints  ORT (opioid risk tool) score is 0  Pain pathology and pain generators spine and joints  Modalities tried injection, surgery, physical therapy, TENS unit, nonsteroidal anti-inflammatory medication multiple injections    Review of Systems :  Denied any fever or chills. No weight loss and no night sweats. No cough or sputum production. No diarrhea   The constipation has been responding to fibers and over the counter medications.     No bladder and bowel incontinence and no other changes in bladder and bowel. No skin changes.  Reports tiredness and  "fatigability only if the pain is not controlled.     Denied opioids diversion and abuse and denies alcoholism. Denies overuse of the pain medications.  No reported euphoria sensation or getting a \"high\" on the pain medications.    The control of the pain with the pain medications is helping the control of the symptoms and allowing the function and activities of daily living, enjoyment of life, improving the quality of life and sleep with less interruption by the pain. The goal is symptomatic control of the nonmalignant chronic pain and not to repair the permanent damage in the tissues inducing the chronic pain conditions. We are aiming to shift the focus from the nonmalignant chronic pain to other aspects of life by symptomatically treating this chronic pain. If this pain is not treated it will lead to major morbidity and it is also associated with increased risks of mortality. The patient understands those very clearly and also understand high risks of morbidity and mortality if not strictly adherent to the treatment recommendations and reporting any associated side effects. Also patient understand the full responsibility associated with these medications to avoid abuse or overuse or any use of these medications for anything besides treating the patient's own chronic pain and nothing else under any circumstances.        Physical examination  Awake, alert and oriented for time place and persons   Pupils are equal and reactive to light and accommodation    Limited range of motion of the lumbar spine straight leg raising increase the pain down the leg.  Limited range of motion of the knee and elbow with knee is worse pain over the medial aspect positive Robert no ligament instability but minimal joint effusion  Elbow showed tenderness  upon range of motion of the shoulder is also painful and could not raise the shoulder over 90 degrees    Diagnosis  Problem List Items Addressed This Visit       Contusion of left " knee - Primary    Relevant Medications    oxyCODONE-acetaminophen (Percocet) 7.5-325 mg tablet (Start on 4/6/2025)    oxyCODONE-acetaminophen (Percocet) 7.5-325 mg tablet (Start on 5/4/2025)    tiZANidine (Zanaflex) 2 mg tablet    traZODone (Desyrel) 50 mg tablet    lubiprostone (Amitiza) 24 mcg capsule    walker misc    Other Relevant Orders    Referral to Physical Therapy    Laceration of right elbow    Relevant Medications    oxyCODONE-acetaminophen (Percocet) 7.5-325 mg tablet (Start on 4/6/2025)    oxyCODONE-acetaminophen (Percocet) 7.5-325 mg tablet (Start on 5/4/2025)    tiZANidine (Zanaflex) 2 mg tablet    traZODone (Desyrel) 50 mg tablet    lubiprostone (Amitiza) 24 mcg capsule    Lumbar sprain    Relevant Medications    oxyCODONE-acetaminophen (Percocet) 7.5-325 mg tablet (Start on 4/6/2025)    oxyCODONE-acetaminophen (Percocet) 7.5-325 mg tablet (Start on 5/4/2025)    tiZANidine (Zanaflex) 2 mg tablet    traZODone (Desyrel) 50 mg tablet    lubiprostone (Amitiza) 24 mcg capsule    walker misc    Other Relevant Orders    Referral to Physical Therapy    Sprain of shoulder, left    Relevant Medications    oxyCODONE-acetaminophen (Percocet) 7.5-325 mg tablet (Start on 4/6/2025)    oxyCODONE-acetaminophen (Percocet) 7.5-325 mg tablet (Start on 5/4/2025)    tiZANidine (Zanaflex) 2 mg tablet    traZODone (Desyrel) 50 mg tablet    lubiprostone (Amitiza) 24 mcg capsule    Other Relevant Orders    Referral to Physical Therapy        Plan  Reviewed the pain generators.  Went over the types of pain with neuropathic and nociceptive and different pathologies and therapeutic modalities. Discussed the mechanism of action of interventions from acupuncture, physical therapy , regular exercises, injections, botox, spinal cord stimulation, and role of surgery     Went over pathology of the intervertebral disc displacement and the anatomical relation to the Nerve roots and relation to the radicular symptoms. Went over  treatment modalities with conservative treatment including acupuncture   and epidural steroid injection with fluoroscopy guidance and last resort of surgery    Based on the above findings and the clinical response to the opioids medications and improvement of the activities of daily living, sleep, and work performance. We made this complex decision to continue the opioids therapy in light of the evidence of the patient's responsibility in using the pain medications as prescribed for the nonmalignant chronic pain condition. We discussed about the use of the pain medications to treat the symptoms of chronic nonmalignant pain and we are not trying the repair the permanent damage in the tissues, rather we are trying to control the symptoms induced by the permanent damage to the tissues inducing the chronic pain condition and resulting disability. I explained the difference and discussed it with the patient and stressed the importance of knowing the difference especially because of the potential side effects and the potential addicting effect and habit forming nature of the dangerous drugs we are using to treat the symptoms of the chronic pain.      We discussed that we are prescribing the medications on good quinn and legitimate medical reason within the scope of professional medical practice.     We reviewed the side effects and precautions of opioids prescriptions as discussed in the opioids treatment agreement.    realizes the interaction between the therapeutic classes including the respiratory depression and potential death     Random drug testing   we will submit     Will need physical therapy I will put order for her and the referral for him.  At this time we will not cut back on the oxycodone since he is managing with the pain once he is done with physical therapy will reevaluate and reconsider cut back on the medication further.  Continue with adjuvant therapy with trazodone and tizanidine  She needs Amitiza  for constipation    Discussed about NSAIDS and I explained about the opioids sparing effect to allow keeping the opioids dose at minimal effective dose.   I went over the potential side effects of the NSAIDS on the gastrointestinal, renal and cardiovascular systems.      I detailed the side effects from the acetaminophen in the medication and made aware of those. I also explained about the cumulative effects on the organs and mainly the liver.     Given the opioids therapy , we discussed about the risk for accidental over dose on the pain medications, either for patient or other household. I went over the mechanism of action and mode of use of the Naloxone according to the  recommendations. I will provide a prescription for a kit.     Follow-up 8 weeks or earlier if needed     The level of clinical decision making in this office visit,  is high, given the high risks of complications with the morbidity and mortality due to the fact that acute and chronic pain may pose a threat to life and bodily function, if under treated, poorly treated, or with failure to maintain adequate treatment and timely medical follow up. Additionally over treatment has its own set of complications including overdosing on the pain medications and also the habit forming potentials with the use of the medications used to treat chronic painful conditions including therapeutic classes classified as dangerous medications. Given the serious and fluctuating nature of pain level and instensity with extensive consideration for whenever pain changes, there is always the risk of prolonged functional impairment requiring close patient monitoring with regular assessments and reassessments and high level medical decision making at every office visit. The amount and complexity of data reviewed is high given the patient clinical presentation, labs,  data, radiology reports, and other tests as discussed during office visits. Pertinent data  whether positive or negative were taken in consideration in the process of making this high level medical decision.

## 2025-04-17 ENCOUNTER — TELEPHONE (OUTPATIENT)
Dept: PHYSICAL THERAPY | Facility: CLINIC | Age: 70
End: 2025-04-17
Payer: MEDICARE

## 2025-04-17 ENCOUNTER — APPOINTMENT (OUTPATIENT)
Dept: PHYSICAL THERAPY | Facility: CLINIC | Age: 70
End: 2025-04-17
Payer: MEDICARE

## 2025-04-17 NOTE — TELEPHONE ENCOUNTER
Called [phone] and left voicemail regarding appt on 4/17 at 1:30 pm. to verify what she is coming in for.

## 2025-04-22 ENCOUNTER — TREATMENT (OUTPATIENT)
Dept: PHYSICAL THERAPY | Facility: CLINIC | Age: 70
End: 2025-04-22
Payer: OTHER GOVERNMENT

## 2025-04-22 ENCOUNTER — TELEPHONE (OUTPATIENT)
Dept: PHYSICAL THERAPY | Facility: CLINIC | Age: 70
End: 2025-04-22
Payer: MEDICARE

## 2025-04-22 DIAGNOSIS — S43.402D SPRAIN OF LEFT SHOULDER, UNSPECIFIED SHOULDER SPRAIN TYPE, SUBSEQUENT ENCOUNTER: ICD-10-CM

## 2025-04-22 DIAGNOSIS — S83.92XD SPRAIN OF LEFT KNEE, UNSPECIFIED LIGAMENT, SUBSEQUENT ENCOUNTER: Primary | ICD-10-CM

## 2025-04-22 DIAGNOSIS — S33.5XXD LUMBAR SPRAIN, SUBSEQUENT ENCOUNTER: ICD-10-CM

## 2025-04-22 PROCEDURE — 97110 THERAPEUTIC EXERCISES: CPT | Mod: GP | Performed by: PHYSICAL THERAPIST

## 2025-04-22 ASSESSMENT — PAIN SCALES - GENERAL: PAINLEVEL_OUTOF10: 8

## 2025-04-22 ASSESSMENT — PAIN DESCRIPTION - DESCRIPTORS: DESCRIPTORS: ACHING

## 2025-04-22 ASSESSMENT — PAIN - FUNCTIONAL ASSESSMENT: PAIN_FUNCTIONAL_ASSESSMENT: 0-10

## 2025-04-22 NOTE — PROGRESS NOTES
"Physical Therapy Treatment    Patient Name: Sonam Saab  MRN: [id number]  Today's Date: 4/22/2025  Time Calculation  Start Time: 1100  Stop Time: 1145  Time Calculation (min): 45 min  PT Therapeutic Procedures Time Entry  Therapeutic Exercise Time Entry: 45    Insurance:  Visit number: 3 of 8  Authorization info: 04/22/25: 8 VISITS 03/31/25 - 05/16/25  USE CPT: S80.02XA/ S43.402A/ S33.5XXA     2025 DOL: AUTHORIZATION REQUIRED/ 100% COVERAGE  Insurance Type: Payor:  DEPARTMENT OF LABOR / Plan: DEPARTMENT OF LABOR / Product Type: *No Product type* /     Current Problem   1. Sprain of left knee, unspecified ligament, subsequent encounter        2. Lumbar sprain, subsequent encounter  Follow Up In Physical Therapy    Follow Up In Physical Therapy      3. Sprain of left shoulder, unspecified shoulder sprain type, subsequent encounter  Follow Up In Physical Therapy    Follow Up In Physical Therapy          Subjective   General   Reason for Referral: lumbar strain, L shoulder sprain, contusion of L knee  Referred By: Guanakito Gil MD  General Comment: Pt with back pain today and c/o L knee being \"bone on bone\"  Precautions:  Precautions  STEADI Fall Risk Score (The score of 4 or more indicates an increased risk of falling): 8  Pain   Pain Assessment: 0-10  0-10 (Numeric) Pain Score: 8  Pain Type: Chronic pain  Pain Location: Back  Pain Orientation: Lower  Pain Descriptors: Aching (Pt reports numbness in both feet)  Post Treatment Pain Level no change in her pain level reported  Walk without assistance using device if needed    Objective   OBSERVATION/POSTURE: increased APT     GAIT: arrived in wheel chair, uses walker at home at times   Current:  Pt arrived in w/c.  Pt sit to stand with rocking and heavy use of UE.  Gait with department WW with gait belt, WBOS, decreased foot clearance, slow carolina for 10 ft to mat.    TRANSITIONS/TRANSFERS: independent with heavy use of UE     FUNCTIONAL MOVEMENT:   LUMBAR AROM  FF " wfl  EXT to neutral to neutral  RSB to patella  LSB to patella  RR  50  LR   50  Current Lumbar AROM  SFF to knees with torturous recovery, pain on return  Ext major limitation with increased pain  R SB to knee central low back pain  L SB to knee with L sided low back pain    L knee ext lacking 20 degrees LAQ  B DF limited due to edema  Pt with LE wraps on for lymphedema   Knee to 90 degrees flexion in sitting   STRENGTH TESTING: on a scale of 0 to 5 unless otherwise noted     HIP  ------                                                                       FLEXION:       R          3               L            3    HIP ABD/ADD IN SITTING:     3+      abd         add  3+   Current  Same  KNEE  -------  EXTENSION     R        3+  current 4              L       3+  current 4-  FLEXION          R          3+ current 4             L          3+   current 4     ANKLE  -------  DF                    R     3+                 L       3+      PF                    R     4                    L       4      SAME   FUNCTIONAL  -----   SIT TO STAND: rocking with multiple attempts to stand, heavy use of UE     FLEXIBILITY  Hip flexors tight  Gastroc mod limited  Pt with lymphedema  B LE wearing wraps today     SPECIAL TEST: +slump L     BALANCE:  5STS: unable to perform without UE A  4 stage balance:   Bilat 10 sec  Semi- tandem 10 sec  Tandem - unable to do  SLS - unable    Functional Outcome Measures  Revised Oswestry 52%    Treatments:  Therapeutic Exercise:  Therapeutic Exercise  Therapeutic Exercise Performed: Yes  Therapeutic Exercise Activity 1: seated AP x20  Therapeutic Exercise Activity 2: LAQ x10  Therapeutic Exercise Activity 3: seated Marching x10  Therapeutic Exercise Activity 4: do not sit more than 1 hour.  Every hour get up and do short walk around home with walker  Therapeutic Exercise Activity 5: use towel roll for lumbar support with sitting  Therapeutic Exercise Activity 6: Oswestry  Therapeutic Exercise  Activity 7: AROM  Therapeutic Exercise Activity 8: MMT  Therapeutic Exercise Activity 9: functional mobility assessed  Prior issued HEP:  Access Code: [email or web address]  URL: [email or web address]  Date: 03/20/2025  Prepared by: Sneha     Exercises  - Seated Cat Cow  - 2 x daily - 7 x weekly - 2 sets - 10 reps  - Supine Lower Trunk Rotation  - 2 x daily - 7 x weekly - 2 sets - 10 reps  - Supine Posterior Pelvic Tilt  - 2 x daily - 7 x weekly - 2 sets - 10 reps  - Seated Diaphragmatic Breathing  - 2 x daily - 7 x weekly - 2 sets - 10 reps    Added today 4/22/25:  Access Code: [email or web address]  URL: [email or web address]  Date: 04/22/2025  Prepared by: Marta Navarrete    Exercises  - Seated Heel Raise  - 1 x daily - 7 x weekly - 3 sets - 10 reps - 3 hold  - Seated March  - 1 x daily - 7 x weekly - 3 sets - 10 reps - 3 hold  - Seated Long Arc Quad  - 1 x daily - 7 x weekly - 3 sets - 10 reps - 3 hold      Assessment   Assessment:   PT Assessment  Assessment Comment: Pt is transferring services from [place] to [place].  Pt is scheduled as a recheck today due to 30 days from the evaluation.  Pt with c/o Back pain and L knee pain as limiting factors.  Pt with limited mobility, impaired AROM back and L knee, pain in both back and L knee, decreased strength, decreased standing and walking tolerance, and fall risk.  Pt may benefit from skilled PT in order to decrease pain, improve AROM, strength, and balance in order to improve overall functional mobility.  Will further assess L knee and L shoulder in supine    Plan:   OP PT Plan  PT Plan: Skilled PT  Onset Date: 08/06/24  Certification Period Start Date: 03/27/25  Certification Period End Date: 06/25/25  Plan of Care Agreement: Patient    OP EDUCATION:  Outpatient Education  Individual(s) Educated: Patient  Education Provided: Home Exercise Program, POC  Patient/Caregiver Demonstrated Understanding: yes  Plan of Care Discussed and Agreed Upon: yes  Patient Response  to Education: Patient/Caregiver Verbalized Understanding of Information, Patient/Caregiver Performed Return Demonstration of Exercises/Activities, Patient/Caregiver Asked Appropriate Questions    Goals:   Active       PT Problem       PT Goal 1       Start:  03/27/25    Expected End:  06/25/25       +  reduce pain to <5/10 for improved tolerance of ADL's and housekeeping such as standing for >15 min to do dishes  +  Increase lumbar ROM by 10 degrees in all restricted planes of motion as needed for ADL's   +  The pt will have 4 /5 strength in  LE as needed to facilitate  improved balance and ambulation  +   The pt will walk with a wheeled walker for 150 or greater with pain at 5/10 or less   + patient will be independent and compliant with HEP measured by independent recall of 2 or more exercises  + Patient will be independent in techniques for self management of chronic pain  + Pt will score <42% on the Revised Oswestry Low Back Disability Questionnaire         Patient Stated Goal 1       Start:  03/27/25    Expected End:  06/25/25       Pt would like to be able to walk without assistance with appropriate device.

## 2025-05-07 ENCOUNTER — APPOINTMENT (OUTPATIENT)
Dept: PHYSICAL THERAPY | Facility: CLINIC | Age: 70
End: 2025-05-07
Payer: MEDICARE

## 2025-05-07 ENCOUNTER — TELEPHONE (OUTPATIENT)
Dept: PHYSICAL THERAPY | Facility: CLINIC | Age: 70
End: 2025-05-07
Payer: MEDICARE

## 2025-05-07 DIAGNOSIS — M47.817 LUMBOSACRAL SPONDYLOSIS WITHOUT MYELOPATHY: Primary | ICD-10-CM

## 2025-05-08 ENCOUNTER — APPOINTMENT (OUTPATIENT)
Dept: PHYSICAL THERAPY | Facility: CLINIC | Age: 70
End: 2025-05-08
Payer: OTHER GOVERNMENT

## 2025-05-14 ENCOUNTER — TREATMENT (OUTPATIENT)
Dept: PHYSICAL THERAPY | Facility: CLINIC | Age: 70
End: 2025-05-14
Payer: OTHER GOVERNMENT

## 2025-05-14 DIAGNOSIS — S33.5XXD LUMBAR SPRAIN, SUBSEQUENT ENCOUNTER: ICD-10-CM

## 2025-05-14 DIAGNOSIS — S43.402D SPRAIN OF LEFT SHOULDER, UNSPECIFIED SHOULDER SPRAIN TYPE, SUBSEQUENT ENCOUNTER: ICD-10-CM

## 2025-05-14 PROCEDURE — 97110 THERAPEUTIC EXERCISES: CPT | Mod: GP | Performed by: PHYSICAL THERAPIST

## 2025-05-14 ASSESSMENT — PAIN DESCRIPTION - DESCRIPTORS: DESCRIPTORS: ACHING

## 2025-05-14 ASSESSMENT — PAIN SCALES - GENERAL: PAINLEVEL_OUTOF10: 8

## 2025-05-14 ASSESSMENT — PAIN - FUNCTIONAL ASSESSMENT: PAIN_FUNCTIONAL_ASSESSMENT: 0-10

## 2025-05-14 NOTE — PROGRESS NOTES
Physical Therapy Treatment/Progress note    Patient Name: Sonam Saab  MRN: [id number]  Today's Date: 5/14/2025  Time Calculation  Start Time: 1415  Stop Time: 1500  Time Calculation (min): 45 min  PT Therapeutic Procedures Time Entry  Therapeutic Exercise Time Entry: 45    Insurance:  Visit number: 4 (2 visits at this facility) of 8  Authorization info: 04/22/25: 8 VISITS 03/31/25 - 05/16/25  USE CPT: S80.02XA/ S43.402A/ S33.5XXA   Insurance Type: Payor:  DEPARTMENT OF LABOR / Plan: DEPARTMENT OF LABOR / Product Type: *No Product type* /     Current Problem   1. Lumbar sprain, subsequent encounter  Follow Up In Physical Therapy      2. Sprain of left shoulder, unspecified shoulder sprain type, subsequent encounter  Follow Up In Physical Therapy          Subjective   General   Reason for Referral: lumbar strain, L shoulder sprain, contusion of L knee  Referred By: Guanakito Gil MD  Past Medical History Relevant to Rehab: PMH:  DM, HTN, reactive airway disease, depression, GERD, Fibromyalgia, insomnia, PVD, osteopenia, lumbosacral spondylosis without myelopathy, AAA, CAD, cochlear  implant, lymphedema  General Comment: Pt states that she has a constant level of pain  Precautions:  Precautions  STEADI Fall Risk Score (The score of 4 or more indicates an increased risk of falling): 8  Medical Precautions: Fall precautions  Precautions Comment: falls precautions, B LE edema  Pain   Pain Assessment: 0-10  0-10 (Numeric) Pain Score: 8  Pain Type: Chronic pain  Pain Location: Back  Pain Orientation: Lower  Pain Descriptors: Aching (B LE numbness)  Post Treatment Pain Level Pt did not report a change in her pain levels    Objective   Objective  OBSERVATION/POSTURE: increased APT     GAIT: arrived in wheel chair, uses walker at home at times   Current:  Pt arrived in w/c.  Pt sit to stand with rocking and heavy use of UE.  Gait with department WW with gait belt, WBOS, decreased foot clearance, slow carolina for 10 ft to  mat.     TRANSITIONS/TRANSFERS: independent with heavy use of UE.   5/14: Pt transfer sit to stand with CG A with 2 steps to Nustep to turn and sit.     FUNCTIONAL MOVEMENT:   LUMBAR AROM  FF wfl  EXT to neutral to neutral  RSB to knee  LSB to knee  RR  50  LR   50  5/14/25:  Current Lumbar AROM  SFF to knees with torturous recovery, pain on return  Ext major limitation with increased pain  R SB to knee central low back pain  L SB to knee with L sided low back pain     L knee ext lacking 20 degrees LAQ  B DF limited due to edema  Pt with LE wraps on for lymphedema   Knee to 90 degrees flexion in sitting     STRENGTH TESTING: on a scale of 0 to 5 unless otherwise noted     HIP  ------                                                                       FLEXION:       R          3-               L            3    HIP ABD/ADD IN SITTING:     3+      abd         add  3+   Current  Same  KNEE  -------  EXTENSION     R        3+  current 4              L       3+  current 4  FLEXION          R          3+ current 4             L          3+   current 4     ANKLE  -------  DF                    R     3+    Current 4             L       3+    Current 4  PF                    R     4                    L       4      SAME   FUNCTIONAL  -----   SIT TO STAND: rocking with multiple attempts to stand, heavy use of UE  5/14/25:  Pt able to stand with less effort today x5 with B UE from w/c     FLEXIBILITY  Hip flexors tight  Gastroc mod limited  Pt with lymphedema  B LE wearing wraps today          BALANCE:  5STS: unable to perform without UE A  5 TSTS with arms 34.5 sec  4 stage balance:   Bilat 10 sec  Semi- tandem 10 sec  Tandem - unable to do  SLS - unable     Functional Outcome Measures  Revised Oswestry 52% same score 52%    Treatments:  Therapeutic Exercise:  Therapeutic Exercise  Therapeutic Exercise Performed: Yes  Therapeutic Exercise Activity 1: Nustep L3 for 5:30 with discussion of current status.  Need to request  extension on date for 8 visit to be received  Therapeutic Exercise Activity 2: standing at bar HR/TR x10  Therapeutic Exercise Activity 3: standing at bar for upport march x10'  Therapeutic Exercise Activity 4: standing ABD R and L 2 x5  using bar support.  Pt challenged standing on L LE pt able to step foot out but not lift off floor  Therapeutic Exercise Activity 5: seated hip add with ball x10 5 ct hold  Therapeutic Exercise Activity 6: seated ABD with GTB x10 5 ct hold  Therapeutic Exercise Activity 7: sit to stand with B UE x5 in 35.4 sec  Therapeutic Exercise Activity 8: oswestry  Therapeutic Exercise Activity 9: AROM  Therapeutic Exercise Activity 10: MMT       Transfer:   Transfers  Transfer: Yes  Pt transfer sit to stand from W/c with CG A. able to take steps without device to Nustep with close S. Pt transfer from nustep to w/c with close S taking steps back to w/c.   sit to stand transfer from W/C with close S to support bar. stand to sit with S. Pt performed 5 TSTS with use of arms from w/c in 34.5 sec.       Access Code: [email or web address]  URL: [email or web address]  Date: 05/14/2025  Prepared by: Marta Navarrete    Exercises  - Seated Hip Adduction Isometrics with Ball  - 1 x daily - 7 x weekly - 3 sets - 10 reps - 5 hold  - Seated Hip Abduction with Resistance  - 1 x daily - 7 x weekly - 3 sets - 10 reps - 5 hold  - Heel Raises with Counter Support  - 1 x daily - 7 x weekly - 3 sets - 10 reps - 3 hold  - Standing March with Counter Support  - 1 x daily - 7 x weekly - 3 sets - 10 reps  - Standing Hip Abduction with Counter Support  - 1 x daily - 7 x weekly - 3 sets - 5 reps  Assessment   Assessment:   PT Assessment  Assessment Comment: Pt ransferred her services to this facility  on 4/22/25.  Pt has received 2 treatments at this facility.  Pt currently progressing with goals and has achived 2 goals.  Pt will require a date extension in order to receive remaining 4 visits.  Request will be sent    Plan:   OP  PT Plan  PT Plan: Skilled PT  Onset Date: 08/06/24  Certification Period Start Date: 03/27/25  Certification Period End Date: 06/25/25  Number of Treatments Authorized: Pt was approved for 8 visits until 5/16/25.  Requesting date extension for remaining 4 visits  Plan of Care Agreement: Patient    OP EDUCATION:  Outpatient Education  Individual(s) Educated: Patient  Education Provided: Home Exercise Program, POC  Patient/Caregiver Demonstrated Understanding: yes  Plan of Care Discussed and Agreed Upon: yes  Patient Response to Education: Patient/Caregiver Verbalized Understanding of Information, Patient/Caregiver Performed Return Demonstration of Exercises/Activities, Patient/Caregiver Asked Appropriate Questions    Goals:   Active       PT Problem       PT Goal 1       Start:  03/27/25    Expected End:  06/25/25       +  reduce pain to <5/10 for improved tolerance of ADL's and housekeeping such as standing for >15 min to do dishes PROGRESSING  +  Increase lumbar ROM by 10 degrees in all restricted planes of motion as needed for ADL's PROGRESSING  +  The pt will have 4 /5 strength in  LE as needed to facilitate  improved balance and ambulation  PRGORESSING  +   The pt will walk with a wheeled walker for 150 or greater with pain at 5/10 or less TBA  + patient will be independent and compliant with HEP measured by independent recall of 2 or more exercises MET  Pt able to recall 2 exercises unsure if consistently performing  + Patient will be independent in techniques for self management of chronic pain PROGRESSING  + Pt will score <42% on the Revised Oswestry Low Back Disability Questionnaire Not PROGRESSING         Patient Stated Goal 1 (Not Progressing)       Start:  03/27/25    Expected End:  06/25/25       Pt would like to be able to walk without assistance with appropriate device.

## 2025-05-15 ENCOUNTER — APPOINTMENT (OUTPATIENT)
Dept: PHYSICAL THERAPY | Facility: CLINIC | Age: 70
End: 2025-05-15
Payer: MEDICARE

## 2025-05-27 ENCOUNTER — APPOINTMENT (OUTPATIENT)
Dept: PAIN MEDICINE | Facility: CLINIC | Age: 70
End: 2025-05-27
Payer: MEDICARE

## 2025-05-27 DIAGNOSIS — Z79.891 LONG TERM CURRENT USE OF OPIATE ANALGESIC: Primary | ICD-10-CM

## 2025-05-27 DIAGNOSIS — J43.8 OTHER EMPHYSEMA (MULTI): ICD-10-CM

## 2025-05-27 DIAGNOSIS — S43.402A SPRAIN OF LEFT SHOULDER, UNSPECIFIED SHOULDER SPRAIN TYPE, INITIAL ENCOUNTER: ICD-10-CM

## 2025-05-27 DIAGNOSIS — E11.49 WELL CONTROLLED TYPE 2 DIABETES MELLITUS WITH NEUROLOGICAL MANIFESTATIONS: ICD-10-CM

## 2025-05-27 DIAGNOSIS — F31.81 BIPOLAR II DISORDER (MULTI): ICD-10-CM

## 2025-05-27 DIAGNOSIS — J43.9 PULMONARY EMPHYSEMA, UNSPECIFIED EMPHYSEMA TYPE (MULTI): ICD-10-CM

## 2025-05-27 DIAGNOSIS — S51.011A LACERATION OF RIGHT ELBOW, INITIAL ENCOUNTER: ICD-10-CM

## 2025-05-27 DIAGNOSIS — J84.9 INTERSTITIAL PULMONARY DISEASE, UNSPECIFIED: ICD-10-CM

## 2025-05-27 DIAGNOSIS — S80.02XA CONTUSION OF LEFT KNEE, INITIAL ENCOUNTER: ICD-10-CM

## 2025-05-27 DIAGNOSIS — S33.5XXA LUMBAR SPRAIN, INITIAL ENCOUNTER: ICD-10-CM

## 2025-05-27 PROBLEM — E66.9 DIABETES MELLITUS TYPE 2 IN OBESE: Status: RESOLVED | Noted: 2024-02-12 | Resolved: 2025-05-27

## 2025-05-27 PROBLEM — J44.9 MODERATE CHRONIC OBSTRUCTIVE PULMONARY DISEASE (MULTI): Status: RESOLVED | Noted: 2022-11-22 | Resolved: 2025-05-27

## 2025-05-27 PROBLEM — E11.69 DIABETES MELLITUS TYPE 2 IN OBESE: Status: RESOLVED | Noted: 2024-02-12 | Resolved: 2025-05-27

## 2025-05-27 PROBLEM — J96.01 ACUTE RESPIRATORY FAILURE WITH HYPOXIA: Status: RESOLVED | Noted: 2022-12-03 | Resolved: 2025-05-27

## 2025-05-27 PROBLEM — J96.20 ACUTE ON CHRONIC RESPIRATORY FAILURE: Status: RESOLVED | Noted: 2022-06-27 | Resolved: 2025-05-27

## 2025-05-27 PROCEDURE — 99214 OFFICE O/P EST MOD 30 MIN: CPT | Performed by: PHYSICAL MEDICINE & REHABILITATION

## 2025-05-27 RX ORDER — OXYCODONE AND ACETAMINOPHEN 7.5; 325 MG/1; MG/1
1 TABLET ORAL EVERY 8 HOURS PRN
Qty: 72 TABLET | Refills: 0 | Status: SHIPPED | OUTPATIENT
Start: 2025-06-30 | End: 2025-07-28

## 2025-05-27 RX ORDER — OXYCODONE AND ACETAMINOPHEN 7.5; 325 MG/1; MG/1
1 TABLET ORAL EVERY 8 HOURS PRN
Qty: 72 TABLET | Refills: 0 | Status: SHIPPED | OUTPATIENT
Start: 2025-06-02 | End: 2025-06-30

## 2025-05-27 RX ORDER — TIZANIDINE 2 MG/1
2-4 TABLET ORAL NIGHTLY
Qty: 84 TABLET | Refills: 1 | Status: SHIPPED | OUTPATIENT
Start: 2025-05-27 | End: 2025-06-24

## 2025-05-27 RX ORDER — TRAZODONE HYDROCHLORIDE 50 MG/1
50 TABLET ORAL NIGHTLY
Qty: 30 TABLET | Refills: 1 | Status: SHIPPED | OUTPATIENT
Start: 2025-05-27 | End: 2025-06-26

## 2025-05-27 NOTE — PROGRESS NOTES
DOL claim [id number]  DOI 08/30/2019       · Contusion of knee, left  (S80.02XA)   · Elbow laceration, right, initial encounter  (S51.011A)   · Sprain ligaments of lumbar spine S33.5XXA   . Unsp Sprain left shoulder joint, initial encounter S43.402A    Chief complaint  Back pain   Knee and elbow pain     Verenda FARHAD MARC,   was present during the entire history and physical examination    History  Sonam Saab is back for pain management office visit  Having pain in the back , knee and shoulder with limited Range of motion   The PT is approved by the DOL and she is going to schedule  I discussed with her about the physical therapy for her spine and various joints.  She mentions that she had previous claim and she feels this approval is for the previous claim however we have not seen her on the previous (in dispute with the Department of [employer].  We are treating her under the current claim above.  Also the previous claim only included the spine and the Claim include the spine and the joints  The pain is mostly mechanical in the spine with intermittent radiation to the lower limb the pain in the elbow and wrist are mechanical she has difficulty ambulating part because of the pain in the joint and spine and also part because of lymphedema    Pain level without medication is 8/10 , with the medication pain level between 2 and 3/10.     Pain disability index (PDI) improvement   across different functional categories, with the pain control with the meds. Forms filled by patient are scanned in the chart    The pain meds are helping control the pain and improving Activities of Daily living and quality of life and quality of sleep.    opioids treatment agreement Jan 2025    Pill count today, using count tray, and in front of patient, Nurse and myself :  10    pills , last fill was on 5/5  for 72 tabs,  the meds pill count today is correct  Oarrs pulled and reviewed, no concerns  last urine toxicology testing was compliant this  "was done on : Dec 2024  Xray updated spine and joints   ORT (opioid risk tool) score is  0  Pain pathology and pain generators spine and joints   Modalities tried injection, surgery, physical therapy, TENS unit, nonsteroidal anti-inflammatory medication       Review of Systems :  He has lymphedema that she is going to cardiology and clinic for lymphedema  Denied any fever or chills. No weight loss and no night sweats. No cough or sputum production. No diarrhea   The constipation has been responding to fibers and over the counter medications.     No bladder and bowel incontinence and no other changes in bladder and bowel. No skin changes.  Reports tiredness and fatigability only if the pain is not controlled.     I further Asked about symptoms or changes affecting the vision, hearing, breathing, digestive system, urinary symptoms, skin, other musculoskeletal condition, neurological conditions these are negative except as detailed in the history and physical examination above    Denied opioids diversion and abuse and denies alcoholism. Denies overuse of the pain medications.  No reported euphoria sensation or getting a \"high\" on the pain medications.    The control of the pain with the pain medications is helping the control of the symptoms and allowing the function and activities of daily living, enjoyment of life, improving the quality of life and sleep with less interruption by the pain. The goal is symptomatic control of the nonmalignant chronic pain and not to repair the permanent damage in the tissues inducing the chronic pain conditions. We are aiming to shift the focus from the nonmalignant chronic pain to other aspects of life by symptomatically treating this chronic pain. If this pain is not treated it will lead to major morbidity and it is also associated with increased risks of mortality. The patient understands those very clearly and also understand high risks of morbidity and mortality if not strictly " adherent to the treatment recommendations and reporting any associated side effects. Also patient understand the full responsibility associated with these medications to avoid abuse or overuse or any use of these medications for anything besides treating the patient's own chronic pain and nothing else under any circumstances.        Physical examination  Awake, alert and oriented for time place and persons   Pupils are equal and reactive to light and accommodation    She have wraps around the knees.  We did not disturb that.  Knee showed minimal swelling tenderness of the medial joint line.  The pain over the lateral epicondylitis.  Negative cervical root tension signs straight leg raising increase the pain in the back down the leg  No abnormal pain behavior  Diagnosis  Problem List Items Addressed This Visit       Contusion of left knee    Relevant Medications    oxyCODONE-acetaminophen (Percocet) 7.5-325 mg tablet (Start on 6/2/2025)    oxyCODONE-acetaminophen (Percocet) 7.5-325 mg tablet (Start on 6/30/2025)    tiZANidine (Zanaflex) 2 mg tablet    traZODone (Desyrel) 50 mg tablet    Laceration of right elbow    Relevant Medications    oxyCODONE-acetaminophen (Percocet) 7.5-325 mg tablet (Start on 6/2/2025)    oxyCODONE-acetaminophen (Percocet) 7.5-325 mg tablet (Start on 6/30/2025)    tiZANidine (Zanaflex) 2 mg tablet    traZODone (Desyrel) 50 mg tablet    Lumbar sprain    Relevant Medications    oxyCODONE-acetaminophen (Percocet) 7.5-325 mg tablet (Start on 6/2/2025)    oxyCODONE-acetaminophen (Percocet) 7.5-325 mg tablet (Start on 6/30/2025)    tiZANidine (Zanaflex) 2 mg tablet    traZODone (Desyrel) 50 mg tablet    Sprain of shoulder, left    Relevant Medications    oxyCODONE-acetaminophen (Percocet) 7.5-325 mg tablet (Start on 6/2/2025)    oxyCODONE-acetaminophen (Percocet) 7.5-325 mg tablet (Start on 6/30/2025)    tiZANidine (Zanaflex) 2 mg tablet    traZODone (Desyrel) 50 mg tablet     Other Visit Diagnoses          Long term current use of opiate analgesic    -  Primary    Relevant Orders    Opiate/Opioid/Benzo Prescription Compliance             Plan  Reviewed the pain generators.  Went over the types of pain with neuropathic and nociceptive and different pathologies and therapeutic modalities. Discussed the mechanism of action of interventions from acupuncture, physical therapy , regular exercises, injections, botox, spinal cord stimulation, and role of surgery     Went over pathology of the intervertebral disc displacement and the anatomical relation to the Nerve roots and relation to the radicular symptoms. Went over treatment modalities with conservative treatment including acupuncture   and epidural steroid injection with fluoroscopy guidance and last resort of surgery    Based on the above findings and the clinical response to the opioids medications and improvement of the activities of daily living, sleep, and work performance. We made this complex decision to continue the opioids therapy in light of the evidence of the patient's responsibility in using the pain medications as prescribed for the nonmalignant chronic pain condition. We discussed about the use of the pain medications to treat the symptoms of chronic nonmalignant pain and we are not trying the repair the permanent damage in the tissues, rather we are trying to control the symptoms induced by the permanent damage to the tissues inducing the chronic pain condition and resulting disability. I explained the difference and discussed it with the patient and stressed the importance of knowing the difference especially because of the potential side effects and the potential addicting effect and habit forming nature of the dangerous drugs we are using to treat the symptoms of the chronic pain.      We discussed that we are prescribing the medications on good quinn and legitimate medical reason within the scope of professional medical practice.     We  reviewed the side effects and precautions of opioids prescriptions as discussed in the opioids treatment agreement.    realizes the interaction between the therapeutic classes including the respiratory depression and potential death     Random drug testing   we will submit     Consider injection for the aggravation of the symptoms  Continue with the pain medication at lower amount 70 tablets for the month    Discussed about NSAIDS and I explained about the opioids sparing effect to allow keeping the opioids dose at minimal effective dose.   I went over the potential side effects of the NSAIDS on the gastrointestinal, renal and cardiovascular systems.      I detailed the side effects from the acetaminophen in the medication and made aware of those. I also explained about the cumulative effects on the organs and mainly the liver.     Given the opioids therapy , we discussed about the risk for accidental over dose on the pain medications, either for patient or other household. I went over the mechanism of action and mode of use of the Naloxone according to the  recommendations. I will provide a prescription for a kit.     Follow-up 8 weeks or earlier if needed     The level of clinical decision making in this office visit,  is high, given the high risks of complications with the morbidity and mortality due to the fact that acute and chronic pain may pose a threat to life and bodily function, if under treated, poorly treated, or with failure to maintain adequate treatment and timely medical follow up. Additionally over treatment has its own set of complications including overdosing on the pain medications and also the habit forming potentials with the use of the medications used to treat chronic painful conditions including therapeutic classes classified as dangerous medications. Given the serious and fluctuating nature of pain level and instensity with extensive consideration for whenever pain changes,  there is always the risk of prolonged functional impairment requiring close patient monitoring with regular assessments and reassessments and high level medical decision making at every office visit. The amount and complexity of data reviewed is high given the patient clinical presentation, labs,  data, radiology reports, and other tests as discussed during office visits. Pertinent data whether positive or negative were taken in consideration in the process of making this high level medical decision.       Patient was identified as a fall risk. Risk prevention instructions provided.  Currently stable

## 2025-06-04 ENCOUNTER — TELEPHONE (OUTPATIENT)
Dept: PHYSICAL THERAPY | Facility: CLINIC | Age: 70
End: 2025-06-04
Payer: MEDICARE

## 2025-06-04 NOTE — TELEPHONE ENCOUNTER
Left a voice message that she wasn't approved for Friday, that her insurance is pending further review and per Tania. Also let patient know to please contact the .

## 2025-06-05 ENCOUNTER — TELEPHONE (OUTPATIENT)
Dept: PHYSICAL THERAPY | Facility: CLINIC | Age: 70
End: 2025-06-05
Payer: MEDICARE

## 2025-06-06 ENCOUNTER — APPOINTMENT (OUTPATIENT)
Dept: PHYSICAL THERAPY | Facility: CLINIC | Age: 70
End: 2025-06-06
Payer: OTHER GOVERNMENT

## 2025-06-11 ENCOUNTER — DOCUMENTATION (OUTPATIENT)
Dept: PAIN MEDICINE | Facility: CLINIC | Age: 70
End: 2025-06-11
Payer: MEDICARE

## 2025-06-12 NOTE — PROGRESS NOTES
DOL claim [id number]  DOI 08/30/2019       · Contusion of knee, left  (S80.02XA)   · Elbow laceration, right, initial encounter  (S51.011A)   · Sprain ligaments of lumbar spine S33.5XXA   . Unsp Sprain left shoulder joint, initial encounter S43.402A      Rx   Please dispense a new rollator with a padded seat, thicker wheels and the next size down from XL rollator.     Thank you       Guanakito Gil MD

## 2025-06-24 ENCOUNTER — APPOINTMENT (OUTPATIENT)
Dept: PHYSICAL THERAPY | Facility: CLINIC | Age: 70
End: 2025-06-24
Payer: OTHER GOVERNMENT

## 2025-07-08 ENCOUNTER — TREATMENT (OUTPATIENT)
Dept: PHYSICAL THERAPY | Facility: CLINIC | Age: 70
End: 2025-07-08
Payer: OTHER GOVERNMENT

## 2025-07-08 ENCOUNTER — APPOINTMENT (OUTPATIENT)
Dept: PHYSICAL THERAPY | Facility: CLINIC | Age: 70
End: 2025-07-08
Payer: OTHER GOVERNMENT

## 2025-07-08 DIAGNOSIS — S43.402D SPRAIN OF LEFT SHOULDER, UNSPECIFIED SHOULDER SPRAIN TYPE, SUBSEQUENT ENCOUNTER: ICD-10-CM

## 2025-07-08 DIAGNOSIS — S33.5XXD LUMBAR SPRAIN, SUBSEQUENT ENCOUNTER: ICD-10-CM

## 2025-07-08 PROCEDURE — 97116 GAIT TRAINING THERAPY: CPT | Mod: GP | Performed by: PHYSICAL THERAPIST

## 2025-07-08 PROCEDURE — 97110 THERAPEUTIC EXERCISES: CPT | Mod: GP | Performed by: PHYSICAL THERAPIST

## 2025-07-08 ASSESSMENT — PAIN - FUNCTIONAL ASSESSMENT: PAIN_FUNCTIONAL_ASSESSMENT: 0-10

## 2025-07-08 ASSESSMENT — PAIN DESCRIPTION - DESCRIPTORS: DESCRIPTORS: ACHING

## 2025-07-08 ASSESSMENT — PAIN SCALES - GENERAL: PAINLEVEL_OUTOF10: 7

## 2025-07-08 NOTE — PROGRESS NOTES
Physical Therapy Treatment    Patient Name: Sonam Saab  MRN: [id number]  Today's Date: 7/8/2025  Time Calculation  Start Time: 1438  Stop Time: 1520  Time Calculation (min): 42 min  PT Therapeutic Procedures Time Entry  Therapeutic Exercise Time Entry: 32  Gait Training Time Entry: 10    Insurance:  Visit number: 5 of 12  Authorization info: 7/6/25 -  DEPT OF LABOR - 12 VISITS 06/05/2025 - 08/15/2025 / USE S43.402A, S33.5XXA / Auth request # [id number] / ds //  Insurance Type: Payor: [employer] DEPARTMENT OF LABOR / Plan: DEPARTMENT OF LABOR / Product Type: *No Product type* /     Current Problem   1. Lumbar sprain, subsequent encounter  Follow Up In Physical Therapy    Follow Up In Physical Therapy      2. Sprain of left shoulder, unspecified shoulder sprain type, subsequent encounter  Follow Up In Physical Therapy    Follow Up In Physical Therapy          Subjective   General   Reason for Referral: lumbar strain, L shoulder sprain, contusion of L knee  Referred By: Guanakito Gil MD  Past Medical History Relevant to Rehab: PMH:  DM, HTN, reactive airway disease, depression, GERD, Fibromyalgia, insomnia, PVD, osteopenia, lumbosacral spondylosis without myelopathy, AAA, CAD, cochlear  implant, lymphedema  General Comment: Pt states that she is finally able to attend therapy.  Pt was waiting for [employer] and services of labor to approve and extension for PT  Precautions:  Precautions  STEADI Fall Risk Score (The score of 4 or more indicates an increased risk of falling): 8  Medical Precautions: Fall precautions  Precautions Comment: falls precautions, B LE edema, Pt current dx with CHF, AAA, cochlear implant  Pain   Pain Assessment: 0-10  0-10 (Numeric) Pain Score: 7  Pain Type: Chronic pain  Pain Location:  (back, B knees, L shoulder)  Pain Orientation:  (low back, B knee diffusely, and L upper arm)  Pain Descriptors: Aching  Post Treatment Pain Level Pt with increased pain with gait trainingt o 7/10    Objective   Pt arrive in  manual w/c with aide sitting in lobby.  Pt with B LE wraps for lymphedema  OBSERVATION/POSTURE  Slumped posture with PPT        TRANSITIONS/TRANSFERS: independent with heavy use of UE.  Pt rocks for momentum sit to stand     FUNCTIONAL MOVEMENT:     Current Lumbar AROM  SFF to knees with torturous recovery, pain on return  Ext major limitation with increased pain  R SB to knee central low back pain  L SB to knee with L sided low back pain     L knee ext lacking 20 degrees LAQ  B DF limited due to edema  Pt with LE wraps on for lymphedema   Knee to 90 degrees flexion in sitting     Shoulder AROM  L shoulder   Flex 100 degrees  ABD 70 degrees  LR unable to reach behind head  MR to hip    R shoulder AROM  Shoulder flex 165 degrees    LR full  MR to wait band        STRENGTH TESTING: on a scale of 0 to 5 unless otherwise noted     HIP  ------                                                                       FLEXION:       R          3 -              L            3 -   HIP ABD/ADD IN SITTING:     3+      abd         add  3+   Current  Same  KNEE  -------  EXTENSION     R        3+  current 4              L       3+  current 3+  FLEXION          R          3+ current 4             L          3+   current 3+     ANKLE  -------  DF                    R     3+   current 4        L       3+    current 4  PF                    R     4       current 4       L       4      current 4  SAME   FUNCTIONAL  -----   SIT TO STAND: rocking with multiple attempts to stand, heavy use of UE     FLEXIBILITY  Hip flexors tight  Gastroc mod limited  Pt with lymphedema  B LE wearing wraps today     SPECIAL TEST: +slump L     BALANCE:  5STS: unable to perform without UE A  4 stage balance:   Bilat 10 sec  Semi- tandem 10 sec  Tandem - unable to do  SLS - unable     Functional Outcome Measures  Revised Oswestry 52%  ----- Same  Treatments:  Therapeutic Exercise:  Therapeutic Exercise  Therapeutic Exercise Performed: Yes  Therapeutic  Exercise Activity 1: goal review/recheck Pt last seen 5/14/25  Therapeutic Exercise Activity 2: AROM  Therapeutic Exercise Activity 3: MMT  Therapeutic Exercise Activity 4: HR/TR x20  Therapeutic Exercise Activity 5: Heel tap with knee ext and heelslide to return x10 R and L  Therapeutic Exercise Activity 6: seated march x20       Gait:  Ambulation/Gait Training  Ambulation/Gait Training Performed: Yes  Ambulation/Gait Training 1  Surface 1: Level tile  Device 1: Parallel bars  Gait Support Devices: Gait belt  Assistance 1: Contact guard  Quality of Gait 1:  (WBOS, decreased foot clearance, slow [place])  Comments/Distance (ft) 1: in  bars 2 laps with intermittent standing rest breaks.  Pt with increased pain in Low back and B LE    Access Code: [email or web address]  URL: [email or web address]  Date: 07/08/2025  Prepared by: Marta Navarrete    Exercises  - Seated Heel Raise  - 1 x daily - 7 x weekly - 3 sets - 10 reps  - Seated Toe Raise  - 1 x daily - 7 x weekly - 3 sets - 10 reps  - Seated March  - 1 x daily - 7 x weekly - 3 sets - 10 reps  - Seated Heel Slide  - 1 x daily - 7 x weekly - 3 sets - 10 reps  Assessment   Assessment:   PT Assessment  Assessment Comment: Pt with improved ankle DF/PF strength post last visit. Pt with increased pain low back and B knees with weight bearing.  will try seated and standing exercises alt as tolerated.  Pt encouraged to perform HEP 2x/day.  Pt did report that the Nustep was too much last time    Plan:   OP PT Plan  PT Plan: Skilled PT  Onset Date: 03/20/25  Certification Period Start Date: 07/08/25  Certification Period End Date: 10/08/25  Number of Treatments Authorized: authorized for 12 visits  Plan of Care Agreement: Patient  Will start next session with warm up walk and side stepping, progress sitting exercises  OP EDUCATION:  Outpatient Education  Individual(s) Educated: Patient  Education Provided: Home Exercise Program, POC  Patient/Caregiver Demonstrated Understanding:  yes  Plan of Care Discussed and Agreed Upon: yes  Patient Response to Education: Patient/Caregiver Verbalized Understanding of Information, Patient/Caregiver Performed Return Demonstration of Exercises/Activities    Goals:   Active       PT Problem       PT Goal 1       Start:  03/27/25    Expected End:  10/08/25       +  reduce pain to <5/10 for improved tolerance of ADL's and housekeeping such as standing for >15 min to do dishes PROGRESSING  +  Increase lumbar ROM by 10 degrees in all restricted planes of motion as needed for ADL's PROGRESSING  +  The pt will have 4 /5 strength in  LE as needed to facilitate  improved balance and ambulation  PRGORESSING  +   The pt will walk with a wheeled walker for 150 or greater with pain at 5/10 or less PROGRESSING  + patient will be independent and compliant with HEP measured by independent recall of 2 or more exercises   Pt  PROGRESSING  + Patient will be independent in techniques for self management of chronic pain PROGRESSING  + Pt will score <42% on the Revised Oswestry Low Back Disability Questionnaire Not PROGRESSING         Patient Stated Goal 1 (Not Progressing)       Start:  03/27/25    Expected End:  10/08/25       Pt would like to be able to walk without assistance with appropriate device.

## 2025-07-10 PROCEDURE — 87101 SKIN FUNGI CULTURE: CPT | Mod: OUT | Performed by: DERMATOLOGY

## 2025-07-15 ENCOUNTER — LAB REQUISITION (OUTPATIENT)
Dept: LAB | Facility: HOSPITAL | Age: 70
End: 2025-07-15
Payer: MEDICARE

## 2025-07-15 DIAGNOSIS — L73.8 OTHER SPECIFIED FOLLICULAR DISORDERS: ICD-10-CM

## 2025-07-22 ENCOUNTER — APPOINTMENT (OUTPATIENT)
Dept: PAIN MEDICINE | Facility: CLINIC | Age: 70
End: 2025-07-22
Payer: MEDICARE

## 2025-07-22 DIAGNOSIS — S43.402A SPRAIN OF LEFT SHOULDER, UNSPECIFIED SHOULDER SPRAIN TYPE, INITIAL ENCOUNTER: ICD-10-CM

## 2025-07-22 DIAGNOSIS — S33.5XXA LUMBAR SPRAIN, INITIAL ENCOUNTER: ICD-10-CM

## 2025-07-22 DIAGNOSIS — S80.02XA CONTUSION OF LEFT KNEE, INITIAL ENCOUNTER: ICD-10-CM

## 2025-07-22 DIAGNOSIS — S51.011A LACERATION OF RIGHT ELBOW, INITIAL ENCOUNTER: ICD-10-CM

## 2025-07-22 DIAGNOSIS — Z79.891 LONG TERM CURRENT USE OF OPIATE ANALGESIC: Primary | ICD-10-CM

## 2025-07-22 PROCEDURE — 99214 OFFICE O/P EST MOD 30 MIN: CPT | Performed by: PHYSICAL MEDICINE & REHABILITATION

## 2025-07-22 RX ORDER — OXYCODONE AND ACETAMINOPHEN 7.5; 325 MG/1; MG/1
1 TABLET ORAL EVERY 8 HOURS PRN
Qty: 72 TABLET | Refills: 0 | Status: SHIPPED | OUTPATIENT
Start: 2025-08-25 | End: 2025-09-22

## 2025-07-22 RX ORDER — TIZANIDINE 2 MG/1
2-4 TABLET ORAL NIGHTLY
Qty: 84 TABLET | Refills: 1 | Status: SHIPPED | OUTPATIENT
Start: 2025-07-22 | End: 2025-08-19

## 2025-07-22 RX ORDER — LUBIPROSTONE 0.02 MG/1
24 CAPSULE ORAL
Qty: 60 CAPSULE | Refills: 1 | Status: SHIPPED | OUTPATIENT
Start: 2025-07-22 | End: 2025-08-21

## 2025-07-22 RX ORDER — TRAZODONE HYDROCHLORIDE 50 MG/1
50 TABLET ORAL NIGHTLY
Qty: 30 TABLET | Refills: 1 | Status: SHIPPED | OUTPATIENT
Start: 2025-07-22 | End: 2025-08-21

## 2025-07-22 RX ORDER — OXYCODONE AND ACETAMINOPHEN 7.5; 325 MG/1; MG/1
1 TABLET ORAL EVERY 8 HOURS PRN
Qty: 72 TABLET | Refills: 0 | Status: SHIPPED | OUTPATIENT
Start: 2025-07-28 | End: 2025-08-25

## 2025-07-22 NOTE — PROGRESS NOTES
DOL claim [id number]  DOI 08/30/2019       · Contusion of knee, left  (S80.02XA)   · Elbow laceration, right, initial encounter  (S51.011A)   · Sprain ligaments of lumbar spine S33.5XXA   . Unsp Sprain left shoulder joint, initial encounter S43.402A      Chief complaint  Back pain . Left knee and shoulder pain      Verenda E LPN,   was present during the entire history and physical examination    History  Sonam Saab is back for pain management office visit  The PT is now approved and she is scheduled to start  Still having hte pain   Pain in the back and joints  Chronic pain and still having pain in back and joints and needs the pain meds   Chronic pain in spine and joitns  Today evaluation to assess the need to continue pain medications, check on the compliance with treatment plan and assess potential to cut back on the pain medications.     Cut backon the pain meds  in the past cut could not cut back further. we discussed about emerging data about tapering opioid therapy for chronic nonmalignant pain.  These are showing increasing evidence of improving the chronic pain itself, anxiety and depression, with the tapering of opioid therapy for chronic nonmalignant pain.  These are additional benefits to the above that we have been discussing.  As long as the cut back is done progressively and safely which we are doing.     Pain level without medication is 8/10 , with the medication pain level 2 to 3 /10.     Pain disability index (PDI) improvement   across different functional categories, with the pain control with the meds. Forms filled by patient are scanned in the chart    The pain meds are helping control the pain and improving Activities of Daily living and quality of life and quality of sleep.    opioids treatment agreement Jan 2025    Pill count today, using count tray, and in front of patient, Nurse and myself :  15    pills , last fill was on 6/30  for 72 tabs,  the meds pill count today is correct  Oarrs  "pulled and reviewed, no concerns  last urine toxicology testing was compliant this was done on : Dec 2024  Xray updated spine  ORT (opioid risk tool) score is  spine   Pain pathology and pain generators spine   Modalities tried injection, surgery, physical therapy, TENS unit, nonsteroidal anti-inflammatory medication       Review of Systems :  Denied any fever or chills. No weight loss and no night sweats. No cough or sputum production. No diarrhea   The constipation has been responding to fibers and over the counter medications.     No bladder and bowel incontinence and no other changes in bladder and bowel. No skin changes.  Reports tiredness and fatigability only if the pain is not controlled.     I further Asked about symptoms or changes affecting the vision, hearing, breathing, digestive system, urinary symptoms, skin, other musculoskeletal condition, neurological conditions these are negative except as detailed in the history and physical examination above    Denied opioids diversion and abuse and denies alcoholism. Denies overuse of the pain medications.  No reported euphoria sensation or getting a \"high\" on the pain medications.    The control of the pain with the pain medications is helping the control of the symptoms and allowing the function and activities of daily living, enjoyment of life, improving the quality of life and sleep with less interruption by the pain. The goal is symptomatic control of the nonmalignant chronic pain and not to repair the permanent damage in the tissues inducing the chronic pain conditions. We are aiming to shift the focus from the nonmalignant chronic pain to other aspects of life by symptomatically treating this chronic pain. If this pain is not treated it will lead to major morbidity and it is also associated with increased risks of mortality. The patient understands those very clearly and also understand high risks of morbidity and mortality if not strictly adherent to " the treatment recommendations and reporting any associated side effects. Also patient understand the full responsibility associated with these medications to avoid abuse or overuse or any use of these medications for anything besides treating the patient's own chronic pain and nothing else under any circumstances.        Physical examination  Awake, alert and oriented for time place and persons   Pupils are equal and reactive to light and accommodation    Examination of the left knee showed mild clinical effusion. Normal skin color and texture palpation of the knee showed tenderness over the medial and lateral joint line and the sensation of crepitation upon range of motion.  Range of motion from -2 degrees to 105 degrees. No medial lateral instability. No drawer sign.  Lachman is negative.  Positive Robert both medially and laterally.  Negative pivot shift.  Homans' sign is negative.  Calves are soft.  Knee flexion and extension is 4/5 and limited by the pain.     Diagnosis  Problem List Items Addressed This Visit     Contusion of left knee    Relevant Medications    oxyCODONE-acetaminophen (Percocet) 7.5-325 mg tablet (Start on 7/28/2025)    oxyCODONE-acetaminophen (Percocet) 7.5-325 mg tablet (Start on 8/25/2025)    Laceration of right elbow    Relevant Medications    oxyCODONE-acetaminophen (Percocet) 7.5-325 mg tablet (Start on 7/28/2025)    oxyCODONE-acetaminophen (Percocet) 7.5-325 mg tablet (Start on 8/25/2025)    Lumbar sprain    Relevant Medications    oxyCODONE-acetaminophen (Percocet) 7.5-325 mg tablet (Start on 7/28/2025)    oxyCODONE-acetaminophen (Percocet) 7.5-325 mg tablet (Start on 8/25/2025)    Sprain of shoulder, left    Relevant Medications    oxyCODONE-acetaminophen (Percocet) 7.5-325 mg tablet (Start on 7/28/2025)    oxyCODONE-acetaminophen (Percocet) 7.5-325 mg tablet (Start on 8/25/2025)   Other Visit Diagnoses       Long term current use of opiate analgesic    -  Primary    Relevant  Orders    Opiate/Opioid/Benzo Prescription Compliance           Plan  Reviewed the pain generators.  Went over the types of pain with neuropathic and nociceptive and different pathologies and therapeutic modalities. Discussed the mechanism of action of interventions from acupuncture, physical therapy , regular exercises, injections, botox, spinal cord stimulation, and role of surgery     Went over pathology of the intervertebral disc displacement and the anatomical relation to the Nerve roots and relation to the radicular symptoms. Went over treatment modalities with conservative treatment including acupuncture   and epidural steroid injection with fluoroscopy guidance and last resort of surgery    Based on the above findings and the clinical response to the opioids medications and improvement of the activities of daily living, sleep, and work performance. We made this complex decision to continue the opioids therapy in light of the evidence of the patient's responsibility in using the pain medications as prescribed for the nonmalignant chronic pain condition. We discussed about the use of the pain medications to treat the symptoms of chronic nonmalignant pain and we are not trying the repair the permanent damage in the tissues, rather we are trying to control the symptoms induced by the permanent damage to the tissues inducing the chronic pain condition and resulting disability. I explained the difference and discussed it with the patient and stressed the importance of knowing the difference especially because of the potential side effects and the potential addicting effect and habit forming nature of the dangerous drugs we are using to treat the symptoms of the chronic pain.      We discussed that we are prescribing the medications on good quinn and legitimate medical reason within the scope of professional medical practice.     We reviewed the side effects and precautions of opioids prescriptions as discussed in  the opioids treatment agreement.    realizes the interaction between the therapeutic classes including the respiratory depression and potential death     Random drug testing   we will submit         Discussed about NSAIDS and I explained about the opioids sparing effect to allow keeping the opioids dose at minimal effective dose.   I went over the potential side effects of the NSAIDS on the gastrointestinal, renal and cardiovascular systems.      I detailed the side effects from the acetaminophen in the medication and made aware of those. I also explained about the cumulative effects on the organs and mainly the liver.     Given the opioids therapy , we discussed about the risk for accidental over dose on the pain medications, either for patient or other household. I went over the mechanism of action and mode of use of the Naloxone according to the  recommendations. I will provide a prescription for a kit.     Focus of Today's Visit :  Continue with  pain meds and consider further cut back  Injection for aggravation   UDS  Continue with PT and Home exercises program       Follow-up 8 weeks or earlier if needed     The level of clinical decision making in this office visit,  is high, given the high risks of complications with the morbidity and mortality due to the fact that acute and chronic pain may pose a threat to life and bodily function, if under treated, poorly treated, or with failure to maintain adequate treatment and timely medical follow up. Additionally over treatment has its own set of complications including overdosing on the pain medications and also the habit forming potentials with the use of the medications used to treat chronic painful conditions including therapeutic classes classified as dangerous medications. Given the serious and fluctuating nature of pain level and instensity with extensive consideration for whenever pain changes, there is always the risk of prolonged functional  impairment requiring close patient monitoring with regular assessments and reassessments and high level medical decision making at every office visit. The amount and complexity of data reviewed is high given the patient clinical presentation, labs,  data, radiology reports, and other tests as discussed during office visits. Pertinent data whether positive or negative were taken in consideration in the process of making this high level medical decision.            Patient was identified as a fall risk. Risk prevention instructions provided.

## 2025-07-22 NOTE — PATIENT INSTRUCTIONS

## 2025-07-23 ENCOUNTER — TREATMENT (OUTPATIENT)
Dept: PHYSICAL THERAPY | Facility: CLINIC | Age: 70
End: 2025-07-23
Payer: OTHER GOVERNMENT

## 2025-07-23 DIAGNOSIS — S33.5XXD LUMBAR SPRAIN, SUBSEQUENT ENCOUNTER: ICD-10-CM

## 2025-07-23 DIAGNOSIS — S43.402D SPRAIN OF LEFT SHOULDER, UNSPECIFIED SHOULDER SPRAIN TYPE, SUBSEQUENT ENCOUNTER: ICD-10-CM

## 2025-07-23 PROCEDURE — 97116 GAIT TRAINING THERAPY: CPT | Mod: GP,CQ

## 2025-07-23 PROCEDURE — 97110 THERAPEUTIC EXERCISES: CPT | Mod: GP,CQ

## 2025-07-23 ASSESSMENT — PAIN SCALES - GENERAL: PAINLEVEL_OUTOF10: 4

## 2025-07-23 NOTE — PROGRESS NOTES
"Physical Therapy Treatment    Patient Name: Sonam Saab  MRN: [id number]  Today's Date: 7/23/2025  Time Calculation  Start Time: 1345  Stop Time: 1430  Time Calculation (min): 45 min  PT Therapeutic Procedures Time Entry  Therapeutic Exercise Time Entry: 35  Gait Training Time Entry: 8    Insurance:  Visit number: 6 of 12  Authorization info: 7/6/25 -  DEPT OF LABOR - 12 VISITS 06/05/2025 - 08/15/2025 / USE S43.402A, S33.5XXA / Auth request # [id number] / ds //    PENDING -  DEPT OF LABOR - DATE EXT 6/6/25 - 10/10/25 - Evidence insufficient to establish medical necessity, pending review / SEE INFO IN COMM FOLDER / ds 6/4/25 // 2025:  DEPT OF LABOR - DATE EXT 6/6/25 - 10/10/25 / SEE INFO IN COMM FOLDER / ds 6/4/25 // 6/2/25 -  DOL EXTENSION FOR 2X   Insurance Type: Payor: [employer] DEPARTMENT OF LABOR / Plan: DEPARTMENT OF LABOR / Product Type: *No Product type* /     Current Problem   1. Lumbar sprain, subsequent encounter  Follow Up In Physical Therapy      2. Sprain of left shoulder, unspecified shoulder sprain type, subsequent encounter  Follow Up In Physical Therapy          Subjective   General   Reason for Referral: lumbar strain, L shoulder sprain, contusion of L knee  Referred By: Guanakito Gil MD  Past Medical History Relevant to Rehab: PMH:  DM, HTN, reactive airway disease, depression, GERD, Fibromyalgia, insomnia, PVD, osteopenia, lumbosacral spondylosis without myelopathy, AAA, CAD, cochlear  implant, lymphedema  General Comment: Pt reports pain \"isn't too bad\" today.  Precautions:  Precautions  STEADI Fall Risk Score (The score of 4 or more indicates an increased risk of falling): 8  Medical Precautions: Fall precautions  Precautions Comment: falls precautions, B LE edema, Pt current dx with CHF, AAA, cochlear implant  Pain   0-10 (Numeric) Pain Score: 4  Pain Type: Chronic pain  Pain Location: Back  Pain Radiating Towards: Also L shoulder  Post Treatment Pain Level 5/10    Objective   Performed " "standing activities at bar, limited DF observed in standing.    Treatments:  Therapeutic Exercise:  Therapeutic Exercise  Therapeutic Exercise Performed: Yes  Therapeutic Exercise Activity 1: Standing PF 2x10  Therapeutic Exercise Activity 2: Standing DF x10  Therapeutic Exercise Activity 3: Standing abduction 2x10 L/R each  Therapeutic Exercise Activity 4: Hip adduction 5\" hold 2x10  Therapeutic Exercise Activity 5: LAQs 2x10 L/R each  Therapeutic Exercise Activity 6: Seated hamstring curls L2 band 2x10 L/R each  Therapeutic Exercise Activity 7: Sit to stand with push up from chair 1x5  Therapeutic Exercise Activity 8: B ER 1# 2x10  Therapeutic Exercise Activity 9: Hip abduction L2 band 2x10    Gait:  Ambulation/Gait Training  Ambulation/Gait Training Performed: Yes  Ambulation/Gait Training 1  Surface 1: Level tile  Device 1: Rolling walker  Gait Support Devices: Gait belt, Wheelchair follow  Assistance 1: Contact guard  Quality of Gait 1: Wide base of support, Decreased step length, Diminished heel strike  Comments/Distance (ft) 1: Pt AMB 45' with RW, slow [place] unsteady, required 3 short standing breaks,  Stair:     Transfer:   Transfers  Transfer: Yes   Wheelchair<->sit,  sit <-> stand using B UEs to push off, contact guard required.    Assessment   Assessment:   PT Assessment  Rehab Prognosis: Fair  Evaluation/Treatment Tolerance: Patient limited by fatigue  Assessment Comment: Pt tolerates ther ex with mild increase in LB S/S.    Plan:   OP PT Plan  PT Plan: Skilled PT  Onset Date: 03/20/25  Certification Period Start Date: 07/08/25  Certification Period End Date: 10/08/25  Number of Treatments Authorized: authorized for 12 visits  Plan of Care Agreement: Patient    OP EDUCATION:  Outpatient Education  Individual(s) Educated: Patient  Education Provided: Fall Risk, Anatomy, Body Mechanics  Patient/Caregiver Demonstrated Understanding: yes  Patient Response to Education: Patient/Caregiver Verbalized " Understanding of Information, Patient/Caregiver Performed Return Demonstration of Exercises/Activities, Patient/Caregiver Asked Appropriate Questions    Goals:   Active       PT Problem       PT Goal 1       Start:  03/27/25    Expected End:  10/08/25       +  reduce pain to <5/10 for improved tolerance of ADL's and housekeeping such as standing for >15 min to do dishes PROGRESSING  +  Increase lumbar ROM by 10 degrees in all restricted planes of motion as needed for ADL's PROGRESSING  +  The pt will have 4 /5 strength in  LE as needed to facilitate  improved balance and ambulation  PRGORESSING  +   The pt will walk with a wheeled walker for 150 or greater with pain at 5/10 or less PROGRESSING  + patient will be independent and compliant with HEP measured by independent recall of 2 or more exercises   Pt  PROGRESSING  + Patient will be independent in techniques for self management of chronic pain PROGRESSING  + Pt will score <42% on the Revised Oswestry Low Back Disability Questionnaire Not PROGRESSING         Patient Stated Goal 1 (Not Progressing)       Start:  03/27/25    Expected End:  10/08/25       Pt would like to be able to walk without assistance with appropriate device.

## 2025-07-25 ENCOUNTER — TREATMENT (OUTPATIENT)
Dept: PHYSICAL THERAPY | Facility: CLINIC | Age: 70
End: 2025-07-25
Payer: OTHER GOVERNMENT

## 2025-07-25 DIAGNOSIS — S33.5XXD LUMBAR SPRAIN, SUBSEQUENT ENCOUNTER: ICD-10-CM

## 2025-07-25 DIAGNOSIS — S43.402D SPRAIN OF LEFT SHOULDER, UNSPECIFIED SHOULDER SPRAIN TYPE, SUBSEQUENT ENCOUNTER: ICD-10-CM

## 2025-07-25 PROCEDURE — 97110 THERAPEUTIC EXERCISES: CPT | Mod: GP | Performed by: PHYSICAL THERAPIST

## 2025-07-25 PROCEDURE — 97116 GAIT TRAINING THERAPY: CPT | Mod: GP | Performed by: PHYSICAL THERAPIST

## 2025-07-25 ASSESSMENT — PAIN SCALES - GENERAL: PAINLEVEL_OUTOF10: 3

## 2025-07-25 NOTE — PROGRESS NOTES
Physical Therapy Treatment    Patient Name: Sonam Saab  MRN: [id number]  Today's Date: 7/25/2025  Time Calculation  Start Time: 1448  Stop Time: 1530  Time Calculation (min): 42 min  PT Therapeutic Procedures Time Entry  Therapeutic Exercise Time Entry: 30  Gait Training Time Entry: 12    Insurance:  Visit number: 7 of 12  Authorization info: 6/4/25 // 6/2/25 -  DOL EXTENSION FOR 2X WEEK FOR 10 WEEKS - 20 VISITS FROM 6/6/25 TO 10/10/25 / INFO IN   JS  Insurance Type: Payor:  DEPARTMENT OF LABOR / Plan: DEPARTMENT OF LABOR / Product Type: *No Product type* /     Current Problem   1. Lumbar sprain, subsequent encounter  Follow Up In Physical Therapy      2. Sprain of left shoulder, unspecified shoulder sprain type, subsequent encounter  Follow Up In Physical Therapy          Subjective   General   Reason for Referral: lumbar strain, L shoulder sprain, contusion of L knee  Referred By: Guanakito Gil MD  Past Medical History Relevant to Rehab: PMH:  DM, HTN, reactive airway disease, depression, GERD, Fibromyalgia, insomnia, PVD, osteopenia, lumbosacral spondylosis without myelopathy, AAA, CAD, cochlear  implant, lymphedema  General Comment: Pt states that she is doing okay.  Pt states that her R knee is bothering her today  Precautions:  Precautions  Medical Precautions: Fall precautions  Precautions Comment: falls precautions, B LE edema, Pt current dx with CHF, AAA, cochlear implant  Pain   0-10 (Numeric) Pain Score: 3  Pain Type: Chronic pain  Pain Location: Back (knee)  Post Treatment Pain Level pt reports that she is tired    Objective   Pt enters in w/c.  Pt with B LE lymphedema wraps in place    Treatments:  Therapeutic Exercise:  Therapeutic Exercise  Therapeutic Exercise Performed: Yes  Therapeutic Exercise Activity 1: side stepping 1 lap 2 x post seated rest  Therapeutic Exercise Activity 2: HR/TR x20  Therapeutic Exercise Activity 3: HS curl with GTB 2 x10 R and L  Therapeutic Exercise  Activity 4: add with ball 2 x10 5 ct hold with cues for bracing and breathing  Therapeutic Exercise Activity 5: seated rows with GTB 2 x10  Therapeutic Exercise Activity 6: backwards walking x2 laps with seated rest  l:     Gait:  Ambulation/Gait Training  Ambulation/Gait Training Performed: Yes  Ambulation/Gait Training 1  Surface 1: Level tile  Device 1: Parallel bars  Gait Support Devices:  (gait belt with w/c to sit if needed)  Assistance 1: Close supervision  Quality of Gait 1: Wide base of support, Decreased step length  Comments/Distance (ft) 1: progressivewalking 30 ft x4 with seated rest after each      Assessment   Assessment:   PT Assessment  Assessment Comment: Pt requires frequent seated rest breaks with standing exercises and gait training.  Pt voices partial compliance with HEP.    Plan:   OP PT Plan  PT Plan:  (continue with generalized conditioning: standing endurance, gait, strengthening UE and LE, and balance)    OP EDUCATION:  Outpatient Education  Individual(s) Educated: Patient  Education Provided: Body Mechanics, Anatomy, Physiology, POC  Patient/Caregiver Demonstrated Understanding: yes  Plan of Care Discussed and Agreed Upon: yes  Patient Response to Education: Patient/Caregiver Verbalized Understanding of Information, Patient/Caregiver Performed Return Demonstration of Exercises/Activities, Patient/Caregiver Asked Appropriate Questions    Goals:   Active       PT Problem       PT Goal 1       Start:  03/27/25    Expected End:  10/08/25       +  reduce pain to <5/10 for improved tolerance of ADL's and housekeeping such as standing for >15 min to do dishes PROGRESSING  +  Increase lumbar ROM by 10 degrees in all restricted planes of motion as needed for ADL's PROGRESSING  +  The pt will have 4 /5 strength in  LE as needed to facilitate  improved balance and ambulation  PRGORESSING  +   The pt will walk with a wheeled walker for 150 or greater with pain at 5/10 or less PROGRESSING  + patient  will be independent and compliant with HEP measured by independent recall of 2 or more exercises   Pt  PROGRESSING  + Patient will be independent in techniques for self management of chronic pain PROGRESSING  + Pt will score <42% on the Revised Oswestry Low Back Disability Questionnaire Not PROGRESSING         Patient Stated Goal 1 (Not Progressing)       Start:  03/27/25    Expected End:  10/08/25       Pt would like to be able to walk without assistance with appropriate device.

## 2025-07-28 ENCOUNTER — TREATMENT (OUTPATIENT)
Dept: PHYSICAL THERAPY | Facility: CLINIC | Age: 70
End: 2025-07-28
Payer: OTHER GOVERNMENT

## 2025-07-28 DIAGNOSIS — S33.5XXD LUMBAR SPRAIN, SUBSEQUENT ENCOUNTER: ICD-10-CM

## 2025-07-28 DIAGNOSIS — S43.402D SPRAIN OF LEFT SHOULDER, UNSPECIFIED SHOULDER SPRAIN TYPE, SUBSEQUENT ENCOUNTER: ICD-10-CM

## 2025-07-28 LAB — FUNGUS SPEC CULT: NORMAL

## 2025-07-28 PROCEDURE — 97110 THERAPEUTIC EXERCISES: CPT | Mod: GP,CQ

## 2025-07-28 ASSESSMENT — PAIN SCALES - GENERAL: PAINLEVEL_OUTOF10: 6

## 2025-07-28 NOTE — PROGRESS NOTES
Physical Therapy Treatment    Patient Name: Sonam Saab  MRN: [id number]  Today's Date: 7/28/2025  Time Calculation  Start Time: 1357  Stop Time: 1432  Time Calculation (min): 35 min  PT Therapeutic Procedures Time Entry  Therapeutic Exercise Time Entry: 25  Gait Training Time Entry: 9    Insurance:  Visit number: 8 of 12  Authorization info: ** PLEASE DON'T WRITE DUPLICATE ORDERS **    7/6/25 -  DEPT OF LABOR - 12 VISITS 06/05/2025 - 08/15/2025 / USE S43.402A, S33.5XXA / Auth request # [id number] / ds //    PENDING -  DEPT OF LABOR - DATE EXT 6/6/25 - 10/10/25 - Evidence insufficient to establish medical necessity, pending review / SEE INFO IN COMM FOLDER / ds 6/4/25 // 2025:  DEPT OF LABOR - DATE EXT 6/6/25 - 10/10/25 / SEE INFO IN COMM FOLDER / ds 6/4/25 // 6/2/25 -  DOL EXTENSION FOR 2X WEEK FOR 10 WEEKS - 20 VISITS FROM 6/6/25 TO 10/10/25 / INFO IN   JS    Insurance Type: Payor:  DEPARTMENT OF LABOR / Plan: DEPARTMENT OF LABOR / Product Type: *No Product type* /     Current Problem   1. Lumbar sprain, subsequent encounter  Follow Up In Physical Therapy      2. Sprain of left shoulder, unspecified shoulder sprain type, subsequent encounter  Follow Up In Physical Therapy          Subjective   General   Reason for Referral: lumbar strain, L shoulder sprain, contusion of L knee  Referred By: Guanakito Gil MD  Past Medical History Relevant to Rehab: PMH:  DM, HTN, reactive airway disease, depression, GERD, Fibromyalgia, insomnia, PVD, osteopenia, lumbosacral spondylosis without myelopathy, AAA, CAD, cochlear  implant, lymphedema  General Comment: Pt reports she is sore, elevator was out at her building nand her daughter had to help her up 1 flight of stairs on Friday.  Precautions:  Precautions  STEADI Fall Risk Score (The score of 4 or more indicates an increased risk of falling): 8  Medical Precautions: Fall precautions  Precautions Comment: falls precautions, B LE edema, Pt current dx  "with CHF, AAA, cochlear implant  Pain   0-10 (Numeric) Pain Score: 6  Pain Type: Chronic pain  Pain Location: Knee  Pain Orientation: Left  Pain Radiating Towards: Also L shoulder, LB  Post Treatment Pain Level \"Same\"    Objective   Pt unsteady during AMB, no LOBs, Pt fatigues quickly standing [id number].    Treatments:  Therapeutic Exercise:  Therapeutic Exercise  Therapeutic Exercise Performed: Yes  Therapeutic Exercise Activity 1: Nustep L4 5'  Therapeutic Exercise Activity 2: Abdominal brace with hip adduction 5\" hold 2x10  Therapeutic Exercise Activity 3: LAQs 2# 2x10 L/R each  Therapeutic Exercise Activity 4: Hamstring curls L4 band 2x10 L/R each  Therapeutic Exercise Activity 5: Hip abduction with L 4 band 45\" hold 2x10  Therapeutic Exercise Activity 6: Sit to stand 2x5 Push from chair  Therapeutic Exercise Activity 7: B ER 1# 2x10    Gait:  Ambulation/Gait Training  Ambulation/Gait Training Performed: Yes  Ambulation/Gait Training 1  Surface 1: Level tile  Device 1: Rolling walker  Gait Support Devices: Gait belt, Wheelchair follow  Assistance 1: Close supervision  Quality of Gait 1: Wide base of support, Decreased step length, Diminished heel strike  Comments/Distance (ft) 1: Pt AMB 40'x3 with W/C follow, short breaks between AMB trials.    Transfer:   Transfers  Transfer: Yes       Assessment   Assessment:   PT Assessment  Rehab Prognosis: Fair  Evaluation/Treatment Tolerance: Patient limited by fatigue  Assessment Comment: Progressions limited due to tardiness, improved AMB this visit.    Plan:   OP PT Plan  Treatment/Interventions: Education/ Instruction, Gait training, Neuromuscular re-education, Therapeutic exercises  PT Plan: Skilled PT  Rehab Potential: Fair    OP EDUCATION:  Outpatient Education  Individual(s) Educated: Patient  Education Provided: Anatomy, Body Mechanics  Patient/Caregiver Demonstrated Understanding: yes  Patient Response to Education: Patient/Caregiver Verbalized Understanding of " Information, Patient/Caregiver Performed Return Demonstration of Exercises/Activities, Patient/Caregiver Asked Appropriate Questions    Goals:   Active       PT Problem       PT Goal 1       Start:  03/27/25    Expected End:  10/08/25       +  reduce pain to <5/10 for improved tolerance of ADL's and housekeeping such as standing for >15 min to do dishes PROGRESSING  +  Increase lumbar ROM by 10 degrees in all restricted planes of motion as needed for ADL's PROGRESSING  +  The pt will have 4 /5 strength in  LE as needed to facilitate  improved balance and ambulation  PRGORESSING  +   The pt will walk with a wheeled walker for 150 or greater with pain at 5/10 or less PROGRESSING  + patient will be independent and compliant with HEP measured by independent recall of 2 or more exercises   Pt  PROGRESSING  + Patient will be independent in techniques for self management of chronic pain PROGRESSING  + Pt will score <42% on the Revised Oswestry Low Back Disability Questionnaire Not PROGRESSING         Patient Stated Goal 1 (Not Progressing)       Start:  03/27/25    Expected End:  10/08/25       Pt would like to be able to walk without assistance with appropriate device.

## 2025-07-30 ENCOUNTER — TREATMENT (OUTPATIENT)
Dept: PHYSICAL THERAPY | Facility: CLINIC | Age: 70
End: 2025-07-30
Payer: OTHER GOVERNMENT

## 2025-07-30 DIAGNOSIS — S43.402D SPRAIN OF LEFT SHOULDER, UNSPECIFIED SHOULDER SPRAIN TYPE, SUBSEQUENT ENCOUNTER: ICD-10-CM

## 2025-07-30 DIAGNOSIS — S33.5XXD LUMBAR SPRAIN, SUBSEQUENT ENCOUNTER: ICD-10-CM

## 2025-07-30 PROCEDURE — 97116 GAIT TRAINING THERAPY: CPT | Mod: GP | Performed by: PHYSICAL THERAPIST

## 2025-07-30 PROCEDURE — 97110 THERAPEUTIC EXERCISES: CPT | Mod: GP | Performed by: PHYSICAL THERAPIST

## 2025-07-30 NOTE — PROGRESS NOTES
Physical Therapy Treatment    Patient Name: Sonam Saab  MRN: [id number]  Today's Date: 7/30/2025  Time Calculation  Start Time: 1345 (Pt arrive late for appointment)  Stop Time: 1415  Time Calculation (min): 30 min  PT Therapeutic Procedures Time Entry  Therapeutic Exercise Time Entry: 20  Gait Training Time Entry: 10    Insurance:  Visit number: 9 of 12  Authorization info: 7/6/25 -  DEPT OF LABOR - 12 VISITS 06/05/2025 - 08/15/2025 / USE S43.402A, S33.5XXA / Auth request # [id number] / ds //    PENDING -  DEPT OF LABOR - DATE EXT 6/6/25 - 10/10/25 - Evidence insufficient to establish medical necessity, pending review / SEE INFO IN COMM FOLDER / ds 6/4/25 //   Insurance Type: Payor: [employer] DEPARTMENT OF LABOR / Plan: DEPARTMENT OF LABOR / Product Type: *No Product type* /     Current Problem   1. Lumbar sprain, subsequent encounter  Follow Up In Physical Therapy      2. Sprain of left shoulder, unspecified shoulder sprain type, subsequent encounter  Follow Up In Physical Therapy          Subjective   General   Reason for Referral: lumbar strain, L shoulder sprain, contusion of L knee  Referred By: Guanakito Gil MD  Past Medical History Relevant to Rehab: PMH:  DM, HTN, reactive airway disease, depression, GERD, Fibromyalgia, insomnia, PVD, osteopenia, lumbosacral spondylosis without myelopathy, AAA, CAD, cochlear  implant, lymphedema  General Comment: Pt states that she had to do stairs at her apartment due to elevator being out of service the other day and pt stated increased her knee pain  Precautions:  Precautions  Medical Precautions: Fall precautions  Precautions Comment: falls precautions, B LE edema, Pt current dx with CHF, AAA, cochlear implant  Pain      Post Treatment Pain Level did not quantify.  Pt demonstrated fatigue    Objective   Pt enter in manual w/c, LE wraps in place for lymphedema    Treatments:  Therapeutic Exercise:  Therapeutic Exercise  Therapeutic Exercise Performed: Yes  Therapeutic  Exercise Activity 1: Nustep L3 for 5 min with discussion of current status  Therapeutic Exercise Activity 2: march with 2# x10  Therapeutic Exercise Activity 3: knee ext 2# 2 x10 R and L  Therapeutic Exercise Activity 4: add with ball and ab- bracing 2 x10 5 ct hold  Therapeutic Exercise Activity 5: abd 2 x10 5 ct hold  Therapeutic Exercise Activity 6: sit to stand 2 x5       Gait:  Ambulation/Gait Training  Ambulation/Gait Training Performed: Yes  Ambulation/Gait Training 1  Surface 1: Level tile  Device 1: Rolling walker  Gait Support Devices: Gait belt, Wheelchair follow  Assistance 1: Close supervision  Quality of Gait 1: Wide base of support, Decreased step length, Diminished heel strike  Comments/Distance (ft) 1: for 25 ft to w/c  Ambulation/Gait Training 2  Surface 2: Level tile  Device 2: Rolling walker  Gait Support Devices: Gait belt, Wheelchair follow  Assistance 2: Close supervision  Quality of Gait 2: Wide base of support, Diminished heel strike, Decreased step length  Comments/Distance (ft) 2: Pt ambulated 30 ft with seated rest and then 20 ft to [facility]      Assessment   Assessment:   PT Assessment  Assessment Comment: Pt with limited standing endurance effecting the distance that she is able to walk.  Pt required seated rest with distance walked.  Pt able to tolerate 2 sets of 10 LE exercises    Plan:   OP PT Plan  PT Plan:  (continue with standing/seated/ and gait activities)    OP EDUCATION:  Outpatient Education  Individual(s) Educated: Patient  Education Provided: Posture  Patient/Caregiver Demonstrated Understanding: yes  Plan of Care Discussed and Agreed Upon: yes  Patient Response to Education: Patient/Caregiver Verbalized Understanding of Information, Patient/Caregiver Performed Return Demonstration of Exercises/Activities, Patient/Caregiver Asked Appropriate Questions    Goals:   Active       PT Problem       PT Goal 1       Start:  03/27/25    Expected End:  10/08/25       +  reduce pain to  <5/10 for improved tolerance of ADL's and housekeeping such as standing for >15 min to do dishes PROGRESSING  +  Increase lumbar ROM by 10 degrees in all restricted planes of motion as needed for ADL's PROGRESSING  +  The pt will have 4 /5 strength in  LE as needed to facilitate  improved balance and ambulation  PRGORESSING  +   The pt will walk with a wheeled walker for 150 or greater with pain at 5/10 or less PROGRESSING  + patient will be independent and compliant with HEP measured by independent recall of 2 or more exercises   Pt  PROGRESSING  + Patient will be independent in techniques for self management of chronic pain PROGRESSING  + Pt will score <42% on the Revised Oswestry Low Back Disability Questionnaire Not PROGRESSING         Patient Stated Goal 1 (Not Progressing)       Start:  03/27/25    Expected End:  10/08/25       Pt would like to be able to walk without assistance with appropriate device.

## 2025-08-04 ENCOUNTER — TREATMENT (OUTPATIENT)
Dept: PHYSICAL THERAPY | Facility: CLINIC | Age: 70
End: 2025-08-04
Payer: OTHER GOVERNMENT

## 2025-08-04 DIAGNOSIS — S33.5XXD LUMBAR SPRAIN, SUBSEQUENT ENCOUNTER: ICD-10-CM

## 2025-08-04 DIAGNOSIS — S43.402D SPRAIN OF LEFT SHOULDER, UNSPECIFIED SHOULDER SPRAIN TYPE, SUBSEQUENT ENCOUNTER: ICD-10-CM

## 2025-08-04 PROCEDURE — 97116 GAIT TRAINING THERAPY: CPT | Mod: GP,CQ

## 2025-08-04 PROCEDURE — 97110 THERAPEUTIC EXERCISES: CPT | Mod: GP,CQ

## 2025-08-04 ASSESSMENT — PAIN SCALES - GENERAL: PAINLEVEL_OUTOF10: 5 - MODERATE PAIN

## 2025-08-04 NOTE — PROGRESS NOTES
Physical Therapy Treatment    Patient Name: Sonam Saab  MRN: [id number]  Today's Date: 8/5/2025  Time Calculation  Start Time: 1518  Stop Time: 1603  Time Calculation (min): 45 min  PT Therapeutic Procedures Time Entry  Therapeutic Exercise Time Entry: 30  Gait Training Time Entry: 10    Insurance:  Visit number: 10 of 12  Authorization info: ** PLEASE DON'T WRITE DUPLICATE ORDERS **    7/6/25 -  DEPT OF LABOR - 12 VISITS 06/05/2025 - 08/15/2025 / USE S43.402A, S33.5XXA / Auth request # [id number] / ds //    PENDING -  DEPT OF LABOR - DATE EXT 6/6/25 - 10/10/25 - Evidence insufficient to establish medical necessity, pending review / SEE INFO IN COMM FOLDER / ds 6/4/25 // 2025:  DEPT OF LABOR - DATE EXT 6/6/25 - 10/10/25 / SEE INFO IN COMM FOLDER / ds 6/4/25 // 6/2/25 -  DOL EXTENSION FOR 2X WEEK FOR 10 WEEKS - 20 VISITS FROM 6/6/25 TO 10/10/25 / INFO IN   JS    05/30/25: SEE ADG NOTE IN COMMUNICATION ON DR ORDER  .04/22/25: 8 VISITS 03/31/25 - 05/16/25  USE DX: S80.02XA/ S43.402A/ S33.5XXA     2025 DOL: AUTHORIZATION REQUIRED/ 100% COVERAGE  Insurance Type: Payor: [employer] DEPARTMENT OF LABOR / Plan: DEPARTMENT OF LABOR / Product Type: *No Product type* /     Current Problem   1. Lumbar sprain, subsequent encounter  Follow Up In Physical Therapy      2. Sprain of left shoulder, unspecified shoulder sprain type, subsequent encounter  Follow Up In Physical Therapy          Subjective   General   Reason for Referral: lumbar strain, L shoulder sprain, contusion of L knee  Referred By: Guanakito Gil MD  Past Medical History Relevant to Rehab: PMH:  DM, HTN, reactive airway disease, depression, GERD, Fibromyalgia, insomnia, PVD, osteopenia, lumbosacral spondylosis without myelopathy, AAA, CAD, cochlear  implant, lymphedema  General Comment: Pt reports L big toe is painful since she stubbed this weekend.  Precautions:  Precautions  Medical Precautions: Fall precautions  Precautions Comment: falls  "precautions, B LE edema, Pt current dx with CHF, AAA, cochlear implant  Pain   0-10 (Numeric) Pain Score: 5 - Moderate pain  Pain Type: Chronic pain, Acute pain  Pain Location: Knee  Pain Orientation: Left  Pain Radiating Towards: Also L foot, L toe.  Post Treatment Pain Level 5/10    Objective   Pt AMB with slow [place], shifts to R in gait.    Treatments:  Therapeutic Exercise:  Therapeutic Exercise  Therapeutic Exercise Performed: Yes  Therapeutic Exercise Activity 1: Nustep L4, 6'  Therapeutic Exercise Activity 2: LAQs 3# 2x10 L/R each  Therapeutic Exercise Activity 3: Isometric abdominal brace with hip adduction 2x10  Therapeutic Exercise Activity 4: Hamstring curls L5 band 2x10 L/R each  Therapeutic Exercise Activity 5: Hip abduction L5 band 5\" hold 2x10  Therapeutic Exercise Activity 6: Standing march 1x10 L/R each  Therapeutic Exercise Activity 7: Sit to stand 2x5 Push from chair    Gait:  Ambulation/Gait Training  Ambulation/Gait Training Performed: Yes  Ambulation/Gait Training 1  Surface 1: Level tile  Device 1: Rolling walker  Gait Support Devices: Gait belt  Assistance 1: Close supervision  Quality of Gait 1: Wide base of support, Decreased step length, Diminished heel strike  Comments/Distance (ft) 1: Pt AMB 40'x1, 60'x1 with RW, W/C follow, close supervision.     Assessment   Assessment:   PT Assessment  Rehab Prognosis: Fair  Evaluation/Treatment Tolerance: Patient limited by fatigue  Assessment Comment: Pt tolerates ther ex with transient increases in L big toe pain.    Plan:   OP PT Plan  Treatment/Interventions: Education/ Instruction, Gait training, Neuromuscular re-education, Therapeutic exercises  PT Plan: Skilled PT  Rehab Potential: Fair    OP EDUCATION:  Outpatient Education  Individual(s) Educated: Patient  Education Provided: Body Mechanics  Patient/Caregiver Demonstrated Understanding: yes  Patient Response to Education: Patient/Caregiver Verbalized Understanding of Information, " Patient/Caregiver Performed Return Demonstration of Exercises/Activities, Patient/Caregiver Asked Appropriate Questions    Goals:   Active       PT Problem       PT Goal 1       Start:  03/27/25    Expected End:  10/08/25       +  reduce pain to <5/10 for improved tolerance of ADL's and housekeeping such as standing for >15 min to do dishes PROGRESSING  +  Increase lumbar ROM by 10 degrees in all restricted planes of motion as needed for ADL's PROGRESSING  +  The pt will have 4 /5 strength in  LE as needed to facilitate  improved balance and ambulation  PRGORESSING  +   The pt will walk with a wheeled walker for 150 or greater with pain at 5/10 or less PROGRESSING  + patient will be independent and compliant with HEP measured by independent recall of 2 or more exercises   Pt  PROGRESSING  + Patient will be independent in techniques for self management of chronic pain PROGRESSING  + Pt will score <42% on the Revised Oswestry Low Back Disability Questionnaire Not PROGRESSING         Patient Stated Goal 1 (Not Progressing)       Start:  03/27/25    Expected End:  10/08/25       Pt would like to be able to walk without assistance with appropriate device.

## 2025-08-06 ENCOUNTER — TREATMENT (OUTPATIENT)
Dept: PHYSICAL THERAPY | Facility: CLINIC | Age: 70
End: 2025-08-06
Payer: OTHER GOVERNMENT

## 2025-08-06 DIAGNOSIS — S43.402D SPRAIN OF LEFT SHOULDER, UNSPECIFIED SHOULDER SPRAIN TYPE, SUBSEQUENT ENCOUNTER: ICD-10-CM

## 2025-08-06 DIAGNOSIS — S33.5XXD LUMBAR SPRAIN, SUBSEQUENT ENCOUNTER: ICD-10-CM

## 2025-08-06 PROCEDURE — 97116 GAIT TRAINING THERAPY: CPT | Mod: GP,CQ

## 2025-08-06 PROCEDURE — 97110 THERAPEUTIC EXERCISES: CPT | Mod: GP,CQ

## 2025-08-06 ASSESSMENT — PAIN SCALES - GENERAL: PAINLEVEL_OUTOF10: 5 - MODERATE PAIN

## 2025-08-06 NOTE — PROGRESS NOTES
Physical Therapy Treatment    Patient Name: Sonam Saab  MRN: [id number]  Today's Date: 8/6/2025  Time Calculation  Start Time: 1518  Stop Time: 1604  Time Calculation (min): 46 min  PT Therapeutic Procedures Time Entry  Therapeutic Exercise Time Entry: 35  Gait Training Time Entry: 10    Insurance:  Visit number: 11 of 12  Authorization info: ** PLEASE DON'T WRITE DUPLICATE ORDERS **    7/6/25 -  DEPT OF LABOR - 12 VISITS 06/05/2025 - 08/15/2025 / USE S43.402A, S33.5XXA / Auth request # [id number] / ds //    PENDING -  DEPT OF LABOR - DATE EXT 6/6/25 - 10/10/25 - Evidence insufficient to establish medical necessity, pending review / SEE INFO IN COMM FOLDER / ds 6/4/25 // 2025:  DEPT OF LABOR - DATE EXT 6/6/25 - 10/10/25 / SEE INFO IN COMM FOLDER / ds 6/4/25 // 6/2/25 -  DOL EXTENSION FOR 2X WEEK FOR 10 WEEKS - 20 VISITS FROM 6/6/25 TO 10/10/25 / INFO IN   JS    05/30/25: SEE ADG NOTE IN COMMUNICATION ON DR ORDER  .04/22/25: 8 VISITS 03/31/25 - 05/16/25  USE DX: S80.02XA/ S43.402A/ S33.5XXA     2025 DOL: AUTHORIZATION REQUIRED/ 100% COVERAGE  Insurance Type: Payor: [employer] DEPARTMENT OF LABOR / Plan: DEPARTMENT OF LABOR / Product Type: *No Product type* /     Current Problem   1. Lumbar sprain, subsequent encounter  Follow Up In Physical Therapy      2. Sprain of left shoulder, unspecified shoulder sprain type, subsequent encounter  Follow Up In Physical Therapy          Subjective   General   Reason for Referral: lumbar strain, L shoulder sprain, contusion of L knee  Referred By: Guanakito Gil MD  Past Medical History Relevant to Rehab: PMH:  DM, HTN, reactive airway disease, depression, GERD, Fibromyalgia, insomnia, PVD, osteopenia, lumbosacral spondylosis without myelopathy, AAA, CAD, cochlear  implant, lymphedema  General Comment: Pt reports she was able to walk on her rollator at home since3 last visit.  Precautions:  Precautions  Medical Precautions: Fall precautions  Precautions Comment:  "falls precautions, B LE edema, Pt current dx with CHF, AAA, cochlear implant  Pain   0-10 (Numeric) Pain Score: 5 - Moderate pain  Pain Type: Chronic pain  Pain Location: Back  Pain Orientation: Lower  Pain Radiating Towards: Also L hip,L knee, L big toe, L shoulder  Post Treatment Pain Level 5/10    Objective   Pt requires fewer breaks during AMB activities this visit, Continues to require cueing for core contraction.    Treatments:  Therapeutic Exercise:  Therapeutic Exercise  Therapeutic Exercise Performed: Yes  Therapeutic Exercise Activity 1: Nustep L4, 6'  Therapeutic Exercise Activity 2: LAQs 3# 2x10 L/R each  Therapeutic Exercise Activity 3: Isometric abdominal brace with hip adduction with abdominal brace3 2x10  Therapeutic Exercise Activity 4: Standing march 3# 2x10 L/R each  Therapeutic Exercise Activity 5: Hamstring curls L5 band 2x10 L/R each  Therapeutic Exercise Activity 6: Hip abduction with abdominal brace L5 band 5\" hold 2x10  Therapeutic Exercise Activity 7: B shoulder ER L1 band 2x10  Therapeutic Exercise Activity 8: AAROM shoulder [id number] seated 1x10  Therapeutic Exercise Activity 9: Sit to stand x7    Gait:  Ambulation/Gait Training  Ambulation/Gait Training Performed: Yes  Ambulation/Gait Training 1  Surface 1: Level tile  Device 1: Rolling walker  Gait Support Devices: Gait belt  Assistance 1: Close supervision  Quality of Gait 1: Wide base of support, Decreased step length, Diminished heel strike  Comments/Distance (ft) 1: Pt AMB 45' x1, 60'x1, requires only 1 rest break.     Assessment   Assessment:   PT Assessment  Rehab Prognosis: Fair  Evaluation/Treatment Tolerance: Patient tolerated treatment well  Assessment Comment: Pt improving AMB tolerance, has began using rollator for short distances at home.    Plan:   OP PT Plan  Treatment/Interventions: Education/ Instruction, Gait training, Neuromuscular re-education, Therapeutic exercises  PT Plan: Skilled PT  Rehab Potential: Fair    OP " EDUCATION:  Outpatient Education  Individual(s) Educated: Patient  Education Provided: Anatomy, Body Mechanics  Patient/Caregiver Demonstrated Understanding: yes  Patient Response to Education: Patient/Caregiver Verbalized Understanding of Information, Patient/Caregiver Performed Return Demonstration of Exercises/Activities, Patient/Caregiver Asked Appropriate Questions    Goals:   Active       PT Problem       PT Goal 1       Start:  03/27/25    Expected End:  10/08/25       +  reduce pain to <5/10 for improved tolerance of ADL's and housekeeping such as standing for >15 min to do dishes PROGRESSING  +  Increase lumbar ROM by 10 degrees in all restricted planes of motion as needed for ADL's PROGRESSING  +  The pt will have 4 /5 strength in  LE as needed to facilitate  improved balance and ambulation  PRGORESSING  +   The pt will walk with a wheeled walker for 150 or greater with pain at 5/10 or less PROGRESSING  + patient will be independent and compliant with HEP measured by independent recall of 2 or more exercises   Pt  PROGRESSING  + Patient will be independent in techniques for self management of chronic pain PROGRESSING  + Pt will score <42% on the Revised Oswestry Low Back Disability Questionnaire Not PROGRESSING         Patient Stated Goal 1 (Not Progressing)       Start:  03/27/25    Expected End:  10/08/25       Pt would like to be able to walk without assistance with appropriate device.

## 2025-08-11 ENCOUNTER — TREATMENT (OUTPATIENT)
Dept: PHYSICAL THERAPY | Facility: CLINIC | Age: 70
End: 2025-08-11
Payer: OTHER GOVERNMENT

## 2025-08-11 DIAGNOSIS — S33.5XXD LUMBAR SPRAIN, SUBSEQUENT ENCOUNTER: ICD-10-CM

## 2025-08-11 DIAGNOSIS — S43.402D SPRAIN OF LEFT SHOULDER, UNSPECIFIED SHOULDER SPRAIN TYPE, SUBSEQUENT ENCOUNTER: ICD-10-CM

## 2025-08-11 LAB — FUNGUS SPEC CULT: NORMAL

## 2025-08-11 PROCEDURE — 97110 THERAPEUTIC EXERCISES: CPT | Mod: GP,CQ

## 2025-08-11 PROCEDURE — 97116 GAIT TRAINING THERAPY: CPT | Mod: GP,CQ

## 2025-08-12 DIAGNOSIS — S33.5XXA LUMBAR SPRAIN, INITIAL ENCOUNTER: ICD-10-CM

## 2025-08-12 DIAGNOSIS — S51.011A LACERATION OF RIGHT ELBOW, INITIAL ENCOUNTER: ICD-10-CM

## 2025-08-12 DIAGNOSIS — S80.02XA CONTUSION OF LEFT KNEE, INITIAL ENCOUNTER: ICD-10-CM

## 2025-08-12 DIAGNOSIS — S43.402A SPRAIN OF LEFT SHOULDER, UNSPECIFIED SHOULDER SPRAIN TYPE, INITIAL ENCOUNTER: ICD-10-CM

## 2025-08-12 RX ORDER — OXYCODONE AND ACETAMINOPHEN 7.5; 325 MG/1; MG/1
1 TABLET ORAL EVERY 8 HOURS PRN
Qty: 65 TABLET | Refills: 0 | Status: SHIPPED | OUTPATIENT
Start: 2025-08-12 | End: 2025-09-09

## 2025-08-13 ENCOUNTER — TELEPHONE (OUTPATIENT)
Dept: PHYSICAL THERAPY | Facility: CLINIC | Age: 70
End: 2025-08-13
Payer: MEDICARE

## 2025-08-13 ENCOUNTER — APPOINTMENT (OUTPATIENT)
Dept: PHYSICAL THERAPY | Facility: CLINIC | Age: 70
End: 2025-08-13
Payer: OTHER GOVERNMENT

## 2025-09-09 ENCOUNTER — APPOINTMENT (OUTPATIENT)
Dept: PAIN MEDICINE | Facility: CLINIC | Age: 70
End: 2025-09-09
Payer: MEDICARE

## 2025-09-16 ENCOUNTER — APPOINTMENT (OUTPATIENT)
Dept: PAIN MEDICINE | Facility: CLINIC | Age: 70
End: 2025-09-16
Payer: MEDICARE